# Patient Record
(demographics unavailable — no encounter records)

---

## 2025-03-04 NOTE — ED.PDOC
HPI Comments


83 y/o F, BIBA with PMHX of ESRD, HLD, and HTN presents to the ED for CC of 

chest pain. Per EMS, patient is coming from urgent care complaining of 

intermittent non-radiating chest pain. EMS states, that patient was recently 

diagnosed with pneumonia and has had a productive cough with associated 

intermittent chest pain x1week. EMS comments, patient was prescribed antibiotics

which she finished yesterday. Patient was seen at [facility] urgent care, due to 

persistent symptoms; was relayed to the ED for a further evaluation due to 

concern over EKG. Patient denies fever, chills, body-aches, or nasal congestion.

No other symptoms or modifying factors at this time.


Time Seen by MD:  11:20


Primary Care Provider:  unknown


Reviewed Notes:  Nurses Notes, Paramedic Notes, Medications, Allergies


Allergies:  


Coded Allergies:  


     NO KNOWN ALLERGIES (Unverified , 12/23/22)


Home Meds


Active Scripts


Hydrocodone-Acetaminophen (Hydrocodone Bitartrate/AC 5-325 mg) 1 Tab Tab, 1 TAB 

PO Q8HP PRN, #14 TAB


   Prov:MARÍA OCONNELL MD         12/26/22


Erythromycin (Erythromycin) 5 Mg/Gm Oin, 1 DROP OP QID for 5 Days, #1 OIN 0 

Refills


   Prov:MARLEN GIPSON         5/3/22


Reported Medications


Zinc Gluconate (ZINC) 100 Mg Tab, 50 MG PO DAILY, TAB


   12/23/22


Aspirin (ASPIRIN 81) 81 Mg Tab, 81 MG PO DAILY, TAB


   12/23/22


Ferrous Sulfate (Ferrous Sulfate) 325 Mg Tab, 325 MG PO TIDWM for 30 Days


   12/23/22


Hydroxychloroquine Sulfate (Hydroxychloroquine Sulfat) 200 Mg Tab, 200 MG PO BID

for 30 Days, MG


   12/23/22


Docusate Sodium (Docusate Sodium) 100 Mg Tab, 100 MG PO BIDP for 30 Days, MG


   12/23/22


Atorvastatin Calcium (ATORVASTATIN CALCIUM) 10 Mg Tab, 1 TAB PO HS


   12/23/22


Potassium Chloride (Potassium Chloride ER) 10 Meq Tab, 1 TAB PO DAILY


   12/23/22


Alendronate Sodium (Alendronate Sodium) 70 Mg Tab, 1 TAB PO QWEEKLY


   12/23/22


Trazodone Hcl (Trazodone Hcl) 50 Mg Tab, 1 TAB PO HS


   12/23/22


Fluticasone Furoate-Vilanterol (Breo Ellipta 200-25 Mcg/INH) 1 Inh Inh, 1 PUFF 

INH DAILY


   12/23/22


Sevelamer Carbonate (Sevelamer Carbonate) 2.4 Gm Pow, 1 PKT PO TID


   12/23/22


Losartan Potassium (Losartan Potassium) 25 Mg Tab, 1 TAB PO BID


   12/23/22


Levothyroxine Sodium (Levothyroxine Sodium) 100 Mcg Tab, 1 TAB PO DAILY


   12/23/22


Hctz (Hydrochlorothiazide) 25 Mg Tab, 1 TAB PO DAILY


   12/23/22


Information Source:  Patient, Emergency Med Personnel


Mode of Arrival:  EMS


Severity:  Moderate


Timing:  Days


Duration:  Since onset


Prehospital treatment:  None


Location:  Substernal


Radiation:  No Radiation


Onset:  At Rest


Cardiac Risk Factors:  HTN


PE Risk Factors:  None


History of:  None


Modifying Factors:  Nothing


Associated Signs and Symptoms:  None





Past Medical History


PAST MEDICAL HISTORY:  ESRD, High Lipids, HTN


Surgical History:  Cholecystectomy, Hysterectomy, Tonsillectomy


GYN History:  No Pertinent GYN History





Family History


Family History:  Unknown





Social History


Smoker:  Non-Smoker


Alcohol:  Denies ETOH Use


Drugs:  Denies Drug Use


Lives In:  Home





Constitutional:  denies: chills, diaphoresis, fatigue, fever, malaise, sweats, 

weakness, others


EENTM:  denies: blurred vision, double vision, ear bleeding, ear discharge, ear 

drainage, ear pain, ear ringing, eye pain, eye redness, hearing loss, mouth 

pain, mouth swelling, nasal discharge, nose bleeding, nose congestion, nose 

pain, photophobia, tearing, throat pain, throat swelling, voice changes, others


Respiratory:  reports: cough; denies: hemoptysis, orthopnea, SOB at rest, 

shortness of breath, SOB with excertion, stridor, wheezing, others


Cardiovascular:  reports: chest pain; denies: dizzy spells, diaphoresis, Dyspnea

on exertion, edema, irregular heart beat, left arm pain, lightheadedness, 

palpitations, PND, syncope, others


Gastrointestinal:  denies: abdomen distended, abdominal pain, blood streaked 

bowels, constipated, diarrhea, dysphagia, difficulty swallowing, hematemesis, 

melena, nausea, poor appetite, poor fluid intake, rectal bleeding, rectal pain, 

vomiting, others


Genitourinary:  denies: abnormal vagina bleeding, burning, dyspareunia, dysuria,

flank pain, frequency, hematuria, incontinence, pain, pregnant, vagina 

discharge, urgency, others


Neurological:  denies: dizziness, fainting, headache, left sided numbness, left 

sided weakness, numbness, paresthesia, pre-existing deficit, right sided 

numbness, right sided weakness, seizure, speech problems, tingling, tremors, 

weakness, others


Musculoskeletal:  denies: back pain, gout, joint pain, joint swelling, muscle 

pain, muscle stiffness, neck pain, others


Integumetry:  denies: bruises, change in color, change in hair/nails, dryness, 

laceration, lesions, lumps, rash, wounds, others


Allergic/Immunocompromised:  denies: Difficulty Healing, Frequent Infections, 

Hives, Itching, others


Hematologic/Lymphatic:  denies: anemia, blood clots, easy bleeding, easy 

bruising, swollen glands, others


Endocrine:  denies: excessive hunger, excessive sweating, excessive thirst, 

excessive urination, flushing, intolerance to cold, intolerance to heat, 

unexplained weight gain, unexplained weight loss, others


Psychiatric:  denies: anxiety, bipolar disorder, depression, hopeless, panic 

disorder, schizophrenia, sleepless, suicidal, others


All Other Systems:  Reviewed and Negative





Physical Exam


General Appearance:  Moderate Distress


HEENT:  Normal ENT Inspection, Pharynx Normal, TMs Normal


Neck:  Full Range of Motion, Non-Tender, Normal, Normal Inspection


Respiratory:  Chest Non-Tender, Lungs Clear, No Accessory Muscle Use, No 

Respiratory Distress, Normal Breath Sounds


Cardiovascular:  No Edema, No JVD, No Murmur, No Gallop, Normal Peripheral 

Pulses, Regular Rate/Rhythm


Breast Exam:  Deferred


Gastrointestinal:  No Organomegaly, Non Tender, No Pulsatile Mass, Normal Bowel 

Sounds, Soft


Genitalia:  Deferred


Pelvic:  Deferred


Rectal:  Deferred


Extremities:  No calf tenderness, Normal capillary refill, No pedal edema


Musculoskeletal :  


   Apperance:  Normal


Neurologic:  Alert, CNs II-XII nml as Tested, No Motor Deficits, Normal Affect, 

Normal Mood, No Sensory Deficits


Cerebellar Function:  Normal


Reflexes:  Normal


Skin:  Dry, Normal Color, Warm


Lymphatic:  No Adenopathy





EKG


EKG :  


   Pulse Rate (adult):  109


   Axis:  Normal


   Cardiac Rhythm:  NSR


   ST:  Nonsp (T-wave seems to be somewhat peaked)





Was a procedure done?


Was a procedure done?:  No





CP Differential Dx


Differential Diagnosis:  Angina, MI


Differential Diagnosis:  Chest Wall Pain, Costochondritis





X-Ray, Labs, Meds, VS





                                   Vital Signs








  Date Time  Temp Pulse Resp B/P (MAP) Pulse Ox O2 Delivery O2 Flow Rate FiO2


 


3/4/25 11:34  110      


 


3/4/25 11:26 98.0 110 18 83/48 (60) 97   








                                       Lab








Test


 3/4/25


11:43 Range/Units


 


 


White Blood Count


 9.6 


 4.4-10.8


10^3/uL


 


Red Blood Count


 3.97 L


 4.0-5.20


10^6/uL


 


Hemoglobin 11.6 L 12.2-16.2  g/dL


 


Hematocrit 36.4  36.0-46.0  %


 


Mean Corpuscular Volume 91.9  80.0-100.0  fL


 


Mean Corpuscular Hemoglobin 29.2  28.0-32.0  pg


 


Mean Corpuscular Hemoglobin


Concent 31.8 L


 32.0-36.0  g/dL





 


Red Cell Distribution Width 16.6 H 11.8-14.3  %


 


Platelet Count


 298 


 140-450


10^3/uL


 


Mean Platelet Volume 6.9  6.9-10.8  fL


 


Neutrophils (%) (Auto) 69.7  37.0-80.0  %


 


Lymphocytes (%) (Auto) 20.7  10.0-50.0  %


 


Monocytes (%) (Auto) 8.7  0.0-12.0  %


 


Eosinophils (%) (Auto) 0.5  0.0-7.0  %


 


Basophils (%) (Auto) 0.4  0.0-2.0  %


 


Neutrophils # (Auto)


 6.7 


 1.6-8.6  10


^3/uL


 


Lymphocytes # (Auto)


 2.0 


 0.4-5.4  10


^3/uL


 


Monocytes # (Auto) 0.8  0-1.3  10 ^3/uL


 


Eosinophils # (Auto) 0  0-0.8  10 ^3/uL


 


Basophils # (Auto) 0  0-0.2  10 ^3/uL


 


Nucleated Red Blood Cells 0.4   %


 


Sodium Level 131 L 136-145  mmol/L


 


Potassium Level 5.7 *H 3.5-5.1  mmol/L


 


Chloride Level 98    mmol/L


 


Carbon Dioxide Level 19 L 20-31  mmol/L


 


Anion Gap 14  5-15  


 


Blood Urea Nitrogen 25 H 9-23  mg/dL


 


Creatinine


 6.08 H


 0.550-1.02


mg/dL


 


Glomerular Filtration Rate


Calc 6 


 >90  mL/min





 


BUN/Creatinine Ratio 4.1 L 10.0-20.0  


 


Serum Glucose 93    mg/dL


 


Calcium Level 8.6 L 8.7-10.4  mg/dL


 


Troponin I High Sensitivity 26  </=34  ng/L


 


B-Type Natriuretic Peptide Pending   








                               Current Medications








 Medications


  (Trade)  Dose


 Ordered  Sig/Anthony


 Route  Start Time


 Stop Time Status Last Admin


 


 Methylprednisolone


 Sodium Succinate


  (Solu Medrol)  125 mg  ONCE  ONCE


 IV  3/4/25 11:30


 3/4/25 11:31 DC 3/4/25 12:09





 


 Sodium Chloride  500 ml @ 


 500 mls/hr  Q1H ONCE


 IV  3/4/25 12:45


 3/4/25 13:44  3/4/25 12:48








CXR:


FINDINGS: 





Lines and Tubes: None





Lungs: Clear





Pleura: No effusion.





No pneumothorax. 





Cardiomediastinal contours: Unremarkable





Bones: Unremarkable





IMPRESSION: 





No acute disease. 





Electronically Signed by: Orion Joyce at 03/04/2025 11:58:20 AM











DICTATED BY: ORION JOYCE MD


DICTATED DATE/TIME: 03/04/25 1158





SIGNED BY: ORION JOYCE MD


SIGNED DATE/TIME: 03/04/25 1158





CC: 


The patient had an IV Hep-Lock established 


The patient was given Solu-Medrol 125 mg IV push 


The patient was given saline at 500 cc because the patient was hypotensive 


The chemistry panel came back showing a potassium of 5.7 


The BUN is 25 the creatinine is 6.08 


The CBC shows some anemia with a hemoglobin of 11.6 


The patient was being admitted with a diagnosis of acute renal failure as well 

as hyperkalemia and hypotension


The patient will be admitted at this time


The patient was being given calcium, sodium bicarbonate, dextrose and insulin 

for the hyperkalemia 


A nephrology consult will be obtained.


Images Reviewed?:  Images reviewed and evaluated by me


Time of 1ST Reevaluation:  11:50


Reevaluation 1ST:  Unchanged


Patient Education/Counseling:  Diagnosis, Treatment, Prognosis


Family Education/Counseling:  No Family Present





Departure 1


Departure


Time of Disposition:  13:16


Impression:  


   Primary Impression:  


   Acute chest pain


   Additional Impressions:  


   Hyperkalemia


   Acute renal failure


   Qualified Codes:  N17.1 - Acute kidney failure with acute cortical necrosis


   Hypotension


   Qualified Codes:  I95.0 - Idiopathic hypotension


Disposition:  09 ADMITTED AS INPATIENT


Admit to:  Tele


Condition:  Fair





Critical Care Note


Critical Care Time?:  Yes (55 min-critical care time only)





Stability


Stability form required:  Yes


Unstable for transfer:  Telemetry monitoring (Telemetry monitoring required), ED

Physician Assesment (Clinical assesment)





Heart Score


Heart Score:  








Heart Score Response (Comments) Value


 


History N/A 0


 


EKG N/A 0


 


Age N/A 0


 


Risk Factors N/A 0


 


Troponin N/A 0


 


Total  0














I personally scribed for PANKAJ LAMAS MD ([email or web address]) on 3/4/25 at 11:30.  

Electronically submitted by Emily Reyes ([email or web address]).


I personally scribed for PANKAJ LAMAS MD ([email or web address]) on 3/4/25 at 12:48.  

Electronically submitted by Emily Reyes ([email or web address]).





PANKAJ LAMAS MD               Mar 4, 2025 11:30

## 2025-03-04 NOTE — DVHINCON2
Date of service:  Mar 4, 2025


Referring Physician


Dr Oconnell





Reason for Consultation


hypotension





History of Present Illness


Patient is a 82-year-old female with past medical history of ESRD on PD, AFib 

diagnosed November 21, 2024, chronic abdominal pain, and diarrhea after taking 

lactulose presents after being sent in from urgent care for an abnormal EKG. 

Information in this HPI is limited due to the patient being a poor historian.  


Patient initially went to urgent care with complaints of chest pain, recent 

diagnosis of pneumonia and shortness of breath.  During the emergency department

evaluation potassium levels found to be 5.7/5.9.  Patient also found to be AFib 

130s.  Patient was recently discharged from [facility] on 

November 21, 2024 on Eliquis 2.5 and amiodarone bid.  At this time patient 

endorses chest pain, generalized weakness, abdominal pain.  Denies fevers, 

chills, dizziness, leg swelling,





Past Medical History


Patient's past medical history is significant for AFIB and Chronic renal insuff





Family History:  


Cerebrovascular accident (CVA)


  G8 FATHER


Diabetes mellitus


  G8 FATHER


Hypertension


  G8 FATHER


Rectal cancer


  G8 MOTHER


Social History


Smoke:  No


ALCOHOL:  none


Drugs:  None


Lives:  with Family











Allergies:  


Coded Allergies:  


     NO KNOWN ALLERGIES (Unverified , 12/23/22)


Home Meds


Active Scripts


Hydrocodone-Acetaminophen (Hydrocodone Bitartrate/AC 5-325 mg) 1 Tab Tab, 1 TAB 

PO Q8HP PRN, #14 TAB


   Prov:MARÍA OCONNELL MD         12/26/22


Erythromycin (Erythromycin) 5 Mg/Gm Oin, 1 DROP OP QID for 5 Days, #1 OIN 0 

Refills


   Prov:MARLEN GIPSON         5/3/22


Reported Medications


Zinc Gluconate (ZINC) 100 Mg Tab, 50 MG PO DAILY, TAB


   12/23/22


Aspirin (ASPIRIN 81) 81 Mg Tab, 81 MG PO DAILY, TAB


   12/23/22


Ferrous Sulfate (Ferrous Sulfate) 325 Mg Tab, 325 MG PO TIDWM for 30 Days


   12/23/22


Hydroxychloroquine Sulfate (Hydroxychloroquine Sulfat) 200 Mg Tab, 200 MG PO BID

for 30 Days, MG


   12/23/22


Docusate Sodium (Docusate Sodium) 100 Mg Tab, 100 MG PO BIDP for 30 Days, MG


   12/23/22


Atorvastatin Calcium (ATORVASTATIN CALCIUM) 10 Mg Tab, 1 TAB PO HS


   12/23/22


Potassium Chloride (Potassium Chloride ER) 10 Meq Tab, 1 TAB PO DAILY


   12/23/22


Alendronate Sodium (Alendronate Sodium) 70 Mg Tab, 1 TAB PO QWEEKLY


   12/23/22


Trazodone Hcl (Trazodone Hcl) 50 Mg Tab, 1 TAB PO HS


   12/23/22


Fluticasone Furoate-Vilanterol (Breo Ellipta 200-25 Mcg/INH) 1 Inh Inh, 1 PUFF 

INH DAILY


   12/23/22


Sevelamer Carbonate (Sevelamer Carbonate) 2.4 Gm Pow, 1 PKT PO TID


   12/23/22


Losartan Potassium (Losartan Potassium) 25 Mg Tab, 1 TAB PO BID


   12/23/22


Levothyroxine Sodium (Levothyroxine Sodium) 100 Mcg Tab, 1 TAB PO DAILY


   12/23/22


Hctz (Hydrochlorothiazide) 25 Mg Tab, 1 TAB PO DAILY


   12/23/22


Current Medications





                               Current Medications








 Medications


  (Trade)  Dose


 Ordered  Sig/Anthony


 Route


 PRN Reason  Start Time


 Stop Time Status Last Admin


 


 Sodium Chloride  1,000 ml @ 


 60 mls/hr  [id number]


 IV


   3/4/25 17:15


 3/4/25 17:32 DC  





 


 Acetaminophen/


 Hydrocodone Bitart


  (Norco 5/325MG


 Tab)  1 tab  Q4HP  PRN


 PO


 MODERATE PAIN (4-6 PAIN SCALE)  3/4/25 17:15


 3/4/25 17:32 DC  





 


 Ondansetron HCl


  (Zofran)  4 mg  Q4HP  PRN


 IV


 NAUSEA / VOMITING  3/4/25 17:15


 3/4/25 17:32 DC  





 


 Acetaminophen


  (Tylenol Tablet)  650 mg  Q6HP  PRN


 PO


 PAIN SCALE 1-3  OR TEMP>100.4  3/4/25 17:15


 3/4/25 17:32 DC  





 


 Morphine Sulfate  2 mg  Q4HPRN  PRN


 IV


 SEVERE PAIN (7-10 PAIN SCALE)  3/4/25 17:15


 3/4/25 17:32 DC  





 


 Nitroglycerin


  (Ntrostat


 Sublingual)  0.4 mg  Q5MINP  PRN


 SL


 FOR CHEST PAIN  3/4/25 17:15


 3/4/25 17:32 DC  





 


 Morphine Sulfate  2 mg  Q30M  PRN


 IV


 FOR CHEST PAIN  3/4/25 17:15


 3/4/25 17:32 GA  





 


 Aspirin


  (Ecotrin Enteric


 Coated Tablet)  81 mg  DAILY


 PO


   3/5/25 10:00


 3/4/25 17:32 DC  





 


 Hydroxychloroquine


 Sulfate


  (Plaquenil


 Tablet)  200 mg  BID


 PO


   3/4/25 22:00


 3/4/25 17:32 DC  





 


 Levothyroxine


 Sodium


  (Synthroid


 Tablet)  100 mcg  DAILY


 PO


   3/5/25 10:00


 3/4/25 17:32 DC  





 


 Patient Own


 Medication  1 tab  HS


 PO


   3/4/25 22:00


 3/4/25 17:32 DC  





 


 Patient Own


 Medication  100 mg  BIDP


 PO


   3/4/25 22:00


 3/4/25 17:32 DC  





 


 Patient Own


 Medication  1 puff  DAILY


 INH


   3/5/25 10:00


 3/4/25 17:32 GA  





 


 Patient Own


 Medication  1 pkt  TID


 PO


   3/4/25 22:00


 3/4/25 17:32 GA  





 


 Patient Own


 Medication  50 mg  DAILY


 PO


   3/5/25 10:00


 3/4/25 17:32 DC  





 


 Ondansetron HCl


  (Zofran)  4 mg  Q4HP  PRN


 IV


 NAUSEA / VOMITING  3/4/25 17:30


     





 


 Morphine Sulfate  2 mg  Q4HPRN  PRN


 IV


 SEVERE PAIN (7-10 PAIN SCALE)  3/4/25 17:30


     





 


 Morphine Sulfate  2 mg  Q30M  PRN


 IV


 FOR CHEST PAIN  3/4/25 17:30


     





 


 Sodium Chloride  1,000 ml @ 


 60 mls/hr  [id number]


 IV


   3/4/25 17:30


     





 


 Acetaminophen/


 Hydrocodone Bitart


  (Norco 5/325MG


 Tab)  1 tab  Q4HP  PRN


 PO


 MODERATE PAIN (4-6 PAIN SCALE)  3/4/25 17:30


     





 


 Acetaminophen


  (Tylenol Tablet)  650 mg  Q6HP  PRN


 PO


 PAIN SCALE 1-3  OR TEMP>100.4  3/4/25 17:30


     





 


 Nitroglycerin


  (Ntrostat


 Sublingual)  0.4 mg  Q5MINP  PRN


 SL


 FOR CHEST PAIN  3/4/25 17:30


     





 


 Aspirin


  (Ecotrin Enteric


 Coated Tablet)  81 mg  DAILY


 PO


   3/5/25 10:00


     





 


 Hydroxychloroquine


 Sulfate


  (Plaquenil


 Tablet)  200 mg  BID


 PO


   3/4/25 22:00


     





 


 Levothyroxine


 Sodium


  (Synthroid


 Tablet)  100 mcg  DAILY


 PO


   3/5/25 10:00


     





 


 Atorvastatin


 Calcium


  (Lipitor)  10 mg  HS


 PO


   3/4/25 22:00


     





 


 Docusate Sodium


  (Colace Capsule)  100 mg  BIDP  PRN


 PO


 CONSTIPATION  3/4/25 21:00


     





 


 Patient Own


 Medication  1 puff  DAILY


 INH


   3/5/25 10:00


   UNV  





 


 Patient Own


 Medication  1 pkt  TID


 PO


   3/4/25 22:00


   UNV  





 


 Patient Own


 Medication  50 mg  DAILY


 PO


   3/5/25 10:00


   UNV  





 


 Vancomycin HCl  0 ml @ 0


 mls/hr  UD


 IV


   3/4/25 18:45


   UNV  





 


 Cefepime HCl  50 ml @ 


 12.5 mls/hr  DAILY


 IV


   3/5/25 10:00


   UNV  





 


 Metronidazole  100 ml @ 


 100 mls/hr  Q8HR


 IV


   3/4/25 22:00


     














Review of Systems


ROS limited


poor historian





Vital Signs





                                   Vital Signs








  Date Time  Temp Pulse Resp B/P (MAP) Pulse Ox O2 Delivery O2 Flow Rate FiO2


 


3/4/25 20:07  118 19  92 Room Air* 0 21


 


3/4/25 19:00    91/46 (61)    


 


3/4/25 11:26 98.0       








Physical Exam


General Appearance:  Alert, Oriented X3, mild distress


HEENT:  Atraumatic, PERRLA, EOMI


Respiratory:  Clear to auscultation, Normal air movement


Cardiovascular:  Normal S1, Normal S2, Other (afib)


Abdominal:  Other (Diffuse tenderness to deep and light palpation): Pd catheter 

site looks okay


Extremities:  No clubbing, No cyanosis, Normal pulses


Skin:  No rashes, No breakdown


Neuro:  Normal speech, Strength at 5/5 X4 ext





Labs/Diagnostic Data





                                      Labs








Test


 3/4/25


19:03 3/4/25


14:50 3/4/25


11:43 Range/Units


 


 


Sodium Level 137 #   136-145  mmol/L


 


Potassium Level 5.9 *H   3.5-5.1  mmol/L


 


Chloride Level 101      mmol/L


 


Carbon Dioxide Level 26    20-31  mmol/L


 


Anion Gap 10    5-15  


 


Blood Urea Nitrogen 30 H   9-23  mg/dL


 


Creatinine


 6.19 H


 


 


 0.550-1.02


mg/dL


 


Glomerular Filtration Rate


Calc 6 


 


 


 >90  mL/min





 


BUN/Creatinine Ratio 4.8 L   10.0-20.0  


 


Serum Glucose 92      mg/dL


 


Calcium Level 8.3 L   8.7-10.4  mg/dL


 


Lactic Acid Level  3.3 *H  0.4-2.0  mmol/L


 


Troponin I High Sensitivity  26   </=34  ng/L


 


White Blood Count


 


 


 9.6 


 4.4-10.8


10^3/uL


 


Red Blood Count


 


 


 3.97 L


 4.0-5.20


10^6/uL


 


Hemoglobin   11.6 L 12.2-16.2  g/dL


 


Hematocrit   36.4  36.0-46.0  %


 


Mean Corpuscular Volume   91.9  80.0-100.0  fL


 


Mean Corpuscular Hemoglobin   29.2  28.0-32.0  pg


 


Mean Corpuscular Hemoglobin


Concent 


 


 31.8 L


 32.0-36.0  g/dL





 


Red Cell Distribution Width   16.6 H 11.8-14.3  %


 


Platelet Count


 


 


 298 


 140-450


10^3/uL


 


Mean Platelet Volume   6.9  6.9-10.8  fL


 


Neutrophils (%) (Auto)   69.7  37.0-80.0  %


 


Lymphocytes (%) (Auto)   20.7  10.0-50.0  %


 


Monocytes (%) (Auto)   8.7  0.0-12.0  %


 


Eosinophils (%) (Auto)   0.5  0.0-7.0  %


 


Basophils (%) (Auto)   0.4  0.0-2.0  %


 


Neutrophils # (Auto)


 


 


 6.7 


 1.6-8.6  10


^3/uL


 


Lymphocytes # (Auto)


 


 


 2.0 


 0.4-5.4  10


^3/uL


 


Monocytes # (Auto)   0.8  0-1.3  10 ^3/uL


 


Eosinophils # (Auto)   0  0-0.8  10 ^3/uL


 


Basophils # (Auto)   0  0-0.2  10 ^3/uL


 


Nucleated Red Blood Cells   0.4   %


 


B-Type Natriuretic Peptide   250.70  0-100  pg/mL











Assessment


Patient is a 82-year-old female presents to the hospital with:





Hypotension


Lactic Acidosis 


Afib uncontrolled


Chest pain


Hyperkalemia


ESRD on PD


Chronic abdominal pain


Diarrhea 





Recommendations:


On pressor, hypotension is likely cardiac due to Afib with rvr vs sepsis





c/o abdominal pain, has PD catheter, recommend fluid analysis, follow cultures





Switched IV ceftriaxone to IV cefepime


cont IV Vancomycin


follow blood cultures





Antibiotic status:


Vancomycin IV [Started on 03/05]





03/04, Abdomen Pelvis CT showed No acute intra-abdominal finding. Isodense 

lesion of the right mid kidney measuring 1.2 cm, attention on follow-up is 

recommended.  


Compression fracture of L2 and possibly superior endplate of L1 with mild 

posterior extension resulting in mild spinal canal stenosis, age-indeterminate  





03/04, Blood culture showed no growth





critical time 45 minutes spent


prognosis gaurded





Thank you for consult.


Plan discussed with:  GELA Costa MD             Mar 4, 2025 20:59

## 2025-03-04 NOTE — DVH
CHEST RADIOGRAPH



Indication: sob



Technique: Single frontal view of the chest was obtained



COMPARISON: CHEST PORTABLE on DOS: 12/27/22, CXRP on DOS: 12/27/22



FINDINGS: 



Lines and Tubes: None



Lungs: Clear



Pleura: No effusion.



No pneumothorax. 



Cardiomediastinal contours: Unremarkable



Bones: Unremarkable



IMPRESSION: 



No acute disease. 



Electronically Signed by: Orion Hodge at 03/04/2025 11:58:20 AM

## 2025-03-04 NOTE — ECG
[facility]

                                       

Test Date:    2025               Test Time:    11:34:54

Pat Name:     MARTIN ADAMS         Department:   ER

Patient ID:   [id number]           Room:         [address] [place] [address]

Gender:       F                        Technician:   GA

:          [id number]               Requested By: PANKAJ LAMAS

Order Number: [id number]        Reading MD:   Geoff Suarez

                                 Measurements

Intervals                              Axis          

Rate:         110                      P:            61

SD:           169                      QRS:          34

QRSD:         104                      T:            64

QT:           341                                    

QTc:          462                                    

                           Interpretive Statements

Sinus tachycardia

Low voltage, precordial leads

Electronically Signed On 3-8-2025 17:43:58 PST by Geoff Suarez



Please click the below link to view image of tracing.

## 2025-03-04 NOTE — ECG
[facility]

                                       

Test Date:    2025               Test Time:    12:33:38

Pat Name:     MARTIN ADAMS         Department:   er

Patient ID:   [id number]           Room:         [address] [place] [address]

Gender:       F                        Technician:   ga

:          [id number]               Requested By: PANKAJ LAMAS

Order Number: [id number]        Reading MD:   Geoff Suarez

                                 Measurements

Intervals                              Axis          

Rate:         112                      P:            50

CA:           150                      QRS:          26

QRSD:         111                      T:            59

QT:           346                                    

QTc:          473                                    

                           Interpretive Statements

Sinus tachycardia

Low voltage, precordial leads

Electronically Signed On 3-8-2025 17:44:36 PST by Geoff Suarez



Please click the below link to view image of tracing.

## 2025-03-05 NOTE — DVHINCON2
Date of service:  Mar 5, 2025





History of Present Illness


Information in this HPI is limited due to the patient being a poor historian.  

Acquired with the assistance of the patient's daughter at the bedside.  82-year-

old female with past medical history of ESRD on PD, AFib diagnosed November 21, 2024, chronic abdominal pain, and diarrhea after taking lactulose presents after

being sent in from urgent care for an abnormal EKG.  Patient initially went to 

urgent care with complaints of chest pain, recent diagnosis of pneumonia and 

shortness of breath.  During the emergency department evaluation potassium 

levels found to be 5.7/5.9.  Patient also found to be AFib 130s.  Patient was 

recently discharged from [facility] on November 21, 2024 on 

Eliquis 2.5 and amiodarone bid.  At this time patient endorses chest pain, 

generalized weakness, abdominal pain.  Denies fevers, chills, dizziness, leg 

swelling,


Cardiovascular:  AFIB


Renal/:  Chronic renal insuff


Smoke:  No


ALCOHOL:  none


Drugs:  None


Lives:  with Family





Past Medical History


reviewed





Family History:  


Cerebrovascular accident (CVA)


  G8 FATHER


Diabetes mellitus


  G8 FATHER


Hypertension


  G8 FATHER


Rectal cancer


  G8 MOTHER





Allergies:  


Coded Allergies:  


     NO KNOWN ALLERGIES (Unverified , 12/23/22)


Home Meds


Active Scripts


Hydrocodone-Acetaminophen (Hydrocodone Bitartrate/AC 5-325 mg) 1 Tab Tab, 1 TAB 

PO Q8HP PRN, #14 TAB


   Prov:MARÍA OCONNELL MD         12/26/22


Erythromycin (Erythromycin) 5 Mg/Gm Oin, 1 DROP OP QID for 5 Days, #1 OIN 0 

Refills


   Prov:MARLEN GIPSON         5/3/22


Reported Medications


Zinc Gluconate (ZINC) 100 Mg Tab, 50 MG PO DAILY, TAB


   12/23/22


Aspirin (ASPIRIN 81) 81 Mg Tab, 81 MG PO DAILY, TAB


   12/23/22


Ferrous Sulfate (Ferrous Sulfate) 325 Mg Tab, 325 MG PO TIDWM for 30 Days


   12/23/22


Hydroxychloroquine Sulfate (Hydroxychloroquine Sulfat) 200 Mg Tab, 200 MG PO BID

for 30 Days, MG


   12/23/22


Docusate Sodium (Docusate Sodium) 100 Mg Tab, 100 MG PO BIDP for 30 Days, MG


   12/23/22


Atorvastatin Calcium (ATORVASTATIN CALCIUM) 10 Mg Tab, 1 TAB PO HS


   12/23/22


Potassium Chloride (Potassium Chloride ER) 10 Meq Tab, 1 TAB PO DAILY


   12/23/22


Alendronate Sodium (Alendronate Sodium) 70 Mg Tab, 1 TAB PO QWEEKLY


   12/23/22


Trazodone Hcl (Trazodone Hcl) 50 Mg Tab, 1 TAB PO HS


   12/23/22


Fluticasone Furoate-Vilanterol (Breo Ellipta 200-25 Mcg/INH) 1 Inh Inh, 1 PUFF 

INH DAILY


   12/23/22


Sevelamer Carbonate (Sevelamer Carbonate) 2.4 Gm Pow, 1 PKT PO TID


   12/23/22


Losartan Potassium (Losartan Potassium) 25 Mg Tab, 1 TAB PO BID


   12/23/22


Levothyroxine Sodium (Levothyroxine Sodium) 100 Mcg Tab, 1 TAB PO DAILY


   12/23/22


Hctz (Hydrochlorothiazide) 25 Mg Tab, 1 TAB PO DAILY


   12/23/22


Current Medications





                               Current Medications








 Medications


  (Trade)  Dose


 Ordered  Sig/Anthony


 Route


 PRN Reason  Start Time


 Stop Time Status Last Admin


 


 Sodium Chloride  1,000 ml @ 


 60 mls/hr  [id number]


 IV


   3/4/25 17:15


 3/4/25 17:32 DC  





 


 Acetaminophen/


 Hydrocodone Bitart


  (Norco 5/325MG


 Tab)  1 tab  Q4HP  PRN


 PO


 MODERATE PAIN (4-6 PAIN SCALE)  3/4/25 17:15


 3/4/25 17:32 DC  





 


 Ondansetron HCl


  (Zofran)  4 mg  Q4HP  PRN


 IV


 NAUSEA / VOMITING  3/4/25 17:15


 3/4/25 17:32 DC  





 


 Acetaminophen


  (Tylenol Tablet)  650 mg  Q6HP  PRN


 PO


 PAIN SCALE 1-3  OR TEMP>100.4  3/4/25 17:15


 3/4/25 17:32 DC  





 


 Morphine Sulfate  2 mg  Q4HPRN  PRN


 IV


 SEVERE PAIN (7-10 PAIN SCALE)  3/4/25 17:15


 3/4/25 17:32 DC  





 


 Nitroglycerin


  (Ntrostat


 Sublingual)  0.4 mg  Q5MINP  PRN


 SL


 FOR CHEST PAIN  3/4/25 17:15


 3/4/25 17:32 DC  





 


 Morphine Sulfate  2 mg  Q30M  PRN


 IV


 FOR CHEST PAIN  3/4/25 17:15


 3/4/25 17:32 DC  





 


 Aspirin


  (Ecotrin Enteric


 Coated Tablet)  81 mg  DAILY


 PO


   3/5/25 10:00


 3/4/25 17:32 DC  





 


 Hydroxychloroquine


 Sulfate


  (Plaquenil


 Tablet)  200 mg  BID


 PO


   3/4/25 22:00


 3/4/25 17:32 DC  





 


 Levothyroxine


 Sodium


  (Synthroid


 Tablet)  100 mcg  DAILY


 PO


   3/5/25 10:00


 3/4/25 17:32 DC  





 


 Patient Own


 Medication  1 tab  HS


 PO


   3/4/25 22:00


 3/4/25 17:32 DC  





 


 Patient Own


 Medication  100 mg  BIDP


 PO


   3/4/25 22:00


 3/4/25 17:32 DC  





 


 Patient Own


 Medication  1 puff  DAILY


 INH


   3/5/25 10:00


 3/4/25 17:32 DC  





 


 Patient Own


 Medication  1 pkt  TID


 PO


   3/4/25 22:00


 3/4/25 17:32 DC  





 


 Patient Own


 Medication  50 mg  DAILY


 PO


   3/5/25 10:00


 3/4/25 17:32 DC  





 


 Ondansetron HCl


  (Zofran)  4 mg  Q4HP  PRN


 IV


 NAUSEA / VOMITING  3/4/25 17:30


     





 


 Morphine Sulfate  2 mg  Q4HPRN  PRN


 IV


 SEVERE PAIN (7-10 PAIN SCALE)  3/4/25 17:30


    3/5/25 09:01





 


 Morphine Sulfate  2 mg  Q30M  PRN


 IV


 FOR CHEST PAIN  3/4/25 17:30


     





 


 Sodium Chloride  1,000 ml @ 


 60 mls/hr  [id number]


 IV


   3/4/25 17:30


    3/4/25 23:24





 


 Acetaminophen/


 Hydrocodone Bitart


  (Norco 5/325MG


 Tab)  1 tab  Q4HP  PRN


 PO


 MODERATE PAIN (4-6 PAIN SCALE)  3/4/25 17:30


    3/5/25 08:40





 


 Acetaminophen


  (Tylenol Tablet)  650 mg  Q6HP  PRN


 PO


 PAIN SCALE 1-3  OR TEMP>100.4  3/4/25 17:30


    3/5/25 06:40





 


 Nitroglycerin


  (Ntrostat


 Sublingual)  0.4 mg  Q5MINP  PRN


 SL


 FOR CHEST PAIN  3/4/25 17:30


     





 


 Aspirin


  (Ecotrin Enteric


 Coated Tablet)  81 mg  DAILY


 PO


   3/5/25 10:00


    3/5/25 10:24





 


 Hydroxychloroquine


 Sulfate


  (Plaquenil


 Tablet)  200 mg  BID


 PO


   3/4/25 22:00


    3/5/25 10:24





 


 Levothyroxine


 Sodium


  (Synthroid


 Tablet)  100 mcg  DAILY


 PO


   3/5/25 10:00


    3/5/25 10:24





 


 Atorvastatin


 Calcium


  (Lipitor)  10 mg  HS


 PO


   3/4/25 22:00


    3/4/25 23:31





 


 Docusate Sodium


  (Colace Capsule)  100 mg  BIDP  PRN


 PO


 CONSTIPATION  3/4/25 21:00


     





 


 Albuterol


  (Ventolin Medneb)  2.5 mg  Q6HR


 NEB


   3/5/25 00:00


     





 


 Sevelamer HCl


  (Renagel)  2,400 mg  TIDWM


 PO


   3/5/25 08:00


    3/5/25 08:42





 


 Zinc Sulfate  220 mg  DAILY


 PO


   3/5/25 10:00


    3/5/25 08:42





 


 Vancomycin HCl  0 ml @ 0


 mls/hr  UD


 IV


   3/4/25 18:45


     





 


 Cefepime HCl  50 ml @ 


 12.5 mls/hr  DAILY


 IV


   3/5/25 10:00


    3/5/25 10:09





 


 Metronidazole  100 ml @ 


 100 mls/hr  Q8HR


 IV


   3/4/25 22:00


    3/5/25 06:40





 


 Budesonide


  (Pulmicort)  0.5 mg  BID


 NEB


   3/4/25 22:00


     





 


 Norepinephrine


 Bitartrate  250 ml @ 


 3.75 mls/hr  Q24H


 IV


   3/4/25 23:45


    3/5/25 02:10





 


 Midodrine


  (Proamatine


 Tablet)  10 mg  TID@0600,1200,1800


 PO


   3/5/25 06:00


    3/5/25 06:40





 


 Phenylephrine HCl  250 ml @ 


 30 mls/hr  Q8H20M


 IV


   3/5/25 03:30


     














Review of Systems


not obtained, pt sleeping





Vital Signs





                                   Vital Signs








  Date Time  Temp Pulse Resp B/P (MAP) Pulse Ox O2 Delivery O2 Flow Rate FiO2


 


3/5/25 11:17   14   Nasal Cannula* 2 28


 


3/5/25 09:35  108      


 


3/5/25 09:31    95/48    


 


3/5/25 08:15     90   


 


3/5/25 06:40 97.8       








Physical Exam


nad


s1 s2 rrr


ctab


soft distended





Labs/Diagnostic Data





                                      Labs








Test


 3/5/25


05:38 3/4/25


21:35 3/4/25


14:50 3/4/25


11:43 Range/Units


 


 


Sodium Level 134 L    136-145  mmol/L


 


Potassium Level 5.0     3.5-5.1  mmol/L


 


Chloride Level 93 L      mmol/L


 


Carbon Dioxide Level 25     20-31  mmol/L


 


Anion Gap 16 H    5-15  


 


Blood Urea Nitrogen 34 H    9-23  mg/dL


 


Creatinine


 6.12 H


 


 


 


 0.550-1.02


mg/dL


 


Glomerular Filtration Rate


Calc 6 


 


 


 


 >90  mL/min





 


BUN/Creatinine Ratio 5.6 L    10.0-20.0  


 


Serum Glucose 141 H      mg/dL


 


Calcium Level 8.8     8.7-10.4  mg/dL


 


Random Vancomycin Level 16.6 H    5-10  ug/mL


 


POC Glucose  128 H     mg/dl


 


Lactic Acid Level   3.3 *H  0.4-2.0  mmol/L


 


Troponin I High Sensitivity   26   </=34  ng/L


 


White Blood Count


 


 


 


 9.6 


 4.4-10.8


10^3/uL


 


Red Blood Count


 


 


 


 3.97 L


 4.0-5.20


10^6/uL


 


Hemoglobin    11.6 L 12.2-16.2  g/dL


 


Hematocrit    36.4  36.0-46.0  %


 


Mean Corpuscular Volume    91.9  80.0-100.0  fL


 


Mean Corpuscular Hemoglobin    29.2  28.0-32.0  pg


 


Mean Corpuscular Hemoglobin


Concent 


 


 


 31.8 L


 32.0-36.0  g/dL





 


Red Cell Distribution Width    16.6 H 11.8-14.3  %


 


Platelet Count


 


 


 


 298 


 140-450


10^3/uL


 


Mean Platelet Volume    6.9  6.9-10.8  fL


 


Neutrophils (%) (Auto)    69.7  37.0-80.0  %


 


Lymphocytes (%) (Auto)    20.7  10.0-50.0  %


 


Monocytes (%) (Auto)    8.7  0.0-12.0  %


 


Eosinophils (%) (Auto)    0.5  0.0-7.0  %


 


Basophils (%) (Auto)    0.4  0.0-2.0  %


 


Neutrophils # (Auto)


 


 


 


 6.7 


 1.6-8.6  10


^3/uL


 


Lymphocytes # (Auto)


 


 


 


 2.0 


 0.4-5.4  10


^3/uL


 


Monocytes # (Auto)    0.8  0-1.3  10 ^3/uL


 


Eosinophils # (Auto)    0  0-0.8  10 ^3/uL


 


Basophils # (Auto)    0  0-0.2  10 ^3/uL


 


Nucleated Red Blood Cells    0.4   %


 


B-Type Natriuretic Peptide    250.70  0-100  pg/mL











Assessment


afib


htn


esrd on pd


hyperkalemia


chest pain


Plan/Recommendation


pt is now SR from afib 


cont eliquis


cont BB, amiodarone 200 mg daily


check echo


PD per renal and treat hyperkalemia


trop is -, acs ruled out


conservative management given poor health


Plan discussed with:  Patient











RAFAEL STONE MD               Mar 5, 2025 11:57

## 2025-03-05 NOTE — DVHINCON2
Date of service:  Mar 5, 2025





Reason for Consultation


esrd





History of Present Illness


82 years old female with past medical history of ESRD on PD atrial fibrillation,

hypertension presented with chief complaints of chest pain and shortness of 

breath,, she denies any cloudy effluent from PD catheter


Patient is on [facility]CPD


Noted hyperkalemia on admission that was treated medically


Patient currently treated with IV antibiotics





Past Medical History


As per HPI





Past Surgical History


As per HPI





Allergies:  


Coded Allergies:  


     NO KNOWN ALLERGIES (Unverified , 12/23/22)


Home Meds


Active Scripts


Hydrocodone-Acetaminophen (Hydrocodone Bitartrate/AC 5-325 mg) 1 Tab Tab, 1 TAB 

PO Q8HP PRN, #14 TAB


   Prov:MARÍA OCONNELL MD         12/26/22


Erythromycin (Erythromycin) 5 Mg/Gm Oin, 1 DROP OP QID for 5 Days, #1 OIN 0 

Refills


   Prov:MARLEN GIPSON         5/3/22


Reported Medications


Zinc Gluconate (ZINC) 100 Mg Tab, 50 MG PO DAILY, TAB


   12/23/22


Aspirin (ASPIRIN 81) 81 Mg Tab, 81 MG PO DAILY, TAB


   12/23/22


Ferrous Sulfate (Ferrous Sulfate) 325 Mg Tab, 325 MG PO TIDWM for 30 Days


   12/23/22


Hydroxychloroquine Sulfate (Hydroxychloroquine Sulfat) 200 Mg Tab, 200 MG PO BID

for 30 Days, MG


   12/23/22


Docusate Sodium (Docusate Sodium) 100 Mg Tab, 100 MG PO BIDP for 30 Days, MG


   12/23/22


Atorvastatin Calcium (ATORVASTATIN CALCIUM) 10 Mg Tab, 1 TAB PO HS


   12/23/22


Potassium Chloride (Potassium Chloride ER) 10 Meq Tab, 1 TAB PO DAILY


   12/23/22


Alendronate Sodium (Alendronate Sodium) 70 Mg Tab, 1 TAB PO QWEEKLY


   12/23/22


Trazodone Hcl (Trazodone Hcl) 50 Mg Tab, 1 TAB PO HS


   12/23/22


Fluticasone Furoate-Vilanterol (Breo Ellipta 200-25 Mcg/INH) 1 Inh Inh, 1 PUFF 

INH DAILY


   12/23/22


Sevelamer Carbonate (Sevelamer Carbonate) 2.4 Gm Pow, 1 PKT PO TID


   12/23/22


Losartan Potassium (Losartan Potassium) 25 Mg Tab, 1 TAB PO BID


   12/23/22


Levothyroxine Sodium (Levothyroxine Sodium) 100 Mcg Tab, 1 TAB PO DAILY


   12/23/22


Hctz (Hydrochlorothiazide) 25 Mg Tab, 1 TAB PO DAILY


   12/23/22


Current Medications





                               Current Medications








 Medications


  (Trade)  Dose


 Ordered  Sig/Anthony


 Route


 PRN Reason  Start Time


 Stop Time Status Last Admin


 


 Sodium Chloride  1,000 ml @ 


 60 mls/hr  [id number]


 IV


   3/4/25 17:15


 3/4/25 17:32 DC  





 


 Acetaminophen/


 Hydrocodone Bitart


  (Norco 5/325MG


 Tab)  1 tab  Q4HP  PRN


 PO


 MODERATE PAIN (4-6 PAIN SCALE)  3/4/25 17:15


 3/4/25 17:32 DC  





 


 Ondansetron HCl


  (Zofran)  4 mg  Q4HP  PRN


 IV


 NAUSEA / VOMITING  3/4/25 17:15


 3/4/25 17:32 DC  





 


 Acetaminophen


  (Tylenol Tablet)  650 mg  Q6HP  PRN


 PO


 PAIN SCALE 1-3  OR TEMP>100.4  3/4/25 17:15


 3/4/25 17:32 DC  





 


 Morphine Sulfate  2 mg  Q4HPRN  PRN


 IV


 SEVERE PAIN (7-10 PAIN SCALE)  3/4/25 17:15


 3/4/25 17:32 DC  





 


 Nitroglycerin


  (Ntrostat


 Sublingual)  0.4 mg  Q5MINP  PRN


 SL


 FOR CHEST PAIN  3/4/25 17:15


 3/4/25 17:32 DC  





 


 Morphine Sulfate  2 mg  Q30M  PRN


 IV


 FOR CHEST PAIN  3/4/25 17:15


 3/4/25 17:32 DC  





 


 Aspirin


  (Ecotrin Enteric


 Coated Tablet)  81 mg  DAILY


 PO


   3/5/25 10:00


 3/4/25 17:32 DC  





 


 Hydroxychloroquine


 Sulfate


  (Plaquenil


 Tablet)  200 mg  BID


 PO


   3/4/25 22:00


 3/4/25 17:32 DC  





 


 Levothyroxine


 Sodium


  (Synthroid


 Tablet)  100 mcg  DAILY


 PO


   3/5/25 10:00


 3/4/25 17:32 DC  





 


 Patient Own


 Medication  1 tab  HS


 PO


   3/4/25 22:00


 3/4/25 17:32 DC  





 


 Patient Own


 Medication  100 mg  BIDP


 PO


   3/4/25 22:00


 3/4/25 17:32 DC  





 


 Patient Own


 Medication  1 puff  DAILY


 INH


   3/5/25 10:00


 3/4/25 17:32 DC  





 


 Patient Own


 Medication  1 pkt  TID


 PO


   3/4/25 22:00


 3/4/25 17:32 DC  





 


 Patient Own


 Medication  50 mg  DAILY


 PO


   3/5/25 10:00


 3/4/25 17:32 DC  





 


 Ondansetron HCl


  (Zofran)  4 mg  Q4HP  PRN


 IV


 NAUSEA / VOMITING  3/4/25 17:30


     





 


 Morphine Sulfate  2 mg  Q4HPRN  PRN


 IV


 SEVERE PAIN (7-10 PAIN SCALE)  3/4/25 17:30


    3/5/25 14:13





 


 Morphine Sulfate  2 mg  Q30M  PRN


 IV


 FOR CHEST PAIN  3/4/25 17:30


     





 


 Sodium Chloride  1,000 ml @ 


 60 mls/hr  [id number]


 IV


   3/4/25 17:30


 3/5/25 16:44 DC 3/4/25 23:24





 


 Acetaminophen/


 Hydrocodone Bitart


  (Norco 5/325MG


 Tab)  1 tab  Q4HP  PRN


 PO


 MODERATE PAIN (4-6 PAIN SCALE)  3/4/25 17:30


    3/5/25 08:40





 


 Acetaminophen


  (Tylenol Tablet)  650 mg  Q6HP  PRN


 PO


 PAIN SCALE 1-3  OR TEMP>100.4  3/4/25 17:30


    3/5/25 06:40





 


 Nitroglycerin


  (Ntrostat


 Sublingual)  0.4 mg  Q5MINP  PRN


 SL


 FOR CHEST PAIN  3/4/25 17:30


     





 


 Aspirin


  (Ecotrin Enteric


 Coated Tablet)  81 mg  DAILY


 PO


   3/5/25 10:00


    3/5/25 10:24





 


 Hydroxychloroquine


 Sulfate


  (Plaquenil


 Tablet)  200 mg  BID


 PO


   3/4/25 22:00


    3/5/25 10:24





 


 Levothyroxine


 Sodium


  (Synthroid


 Tablet)  100 mcg  DAILY


 PO


   3/5/25 10:00


    3/5/25 10:24





 


 Atorvastatin


 Calcium


  (Lipitor)  10 mg  HS


 PO


   3/4/25 22:00


    3/4/25 23:31





 


 Docusate Sodium


  (Colace Capsule)  100 mg  BIDP  PRN


 PO


 CONSTIPATION  3/4/25 21:00


     





 


 Albuterol


  (Ventolin Medneb)  2.5 mg  Q6HR


 NEB


   3/5/25 00:00


    3/5/25 11:51





 


 Sevelamer HCl


  (Renagel)  2,400 mg  TIDWM


 PO


   3/5/25 08:00


    3/5/25 08:42





 


 Zinc Sulfate  220 mg  DAILY


 PO


   3/5/25 10:00


    3/5/25 08:42





 


 Vancomycin HCl  0 ml @ 0


 mls/hr  UD


 IV


   3/4/25 18:45


     





 


 Cefepime HCl  50 ml @ 


 12.5 mls/hr  DAILY


 IV


   3/5/25 10:00


    3/5/25 10:09





 


 Metronidazole  100 ml @ 


 100 mls/hr  Q8HR


 IV


   3/4/25 22:00


    3/5/25 13:57





 


 Budesonide


  (Pulmicort)  0.5 mg  BID


 NEB


   3/4/25 22:00


    3/5/25 11:51





 


 Norepinephrine


 Bitartrate  250 ml @ 


 3.75 mls/hr  Q24H


 IV


   3/4/25 23:45


    3/5/25 02:10





 


 Midodrine


  (Proamatine


 Tablet)  10 mg  TID@0600,1200,1800


 PO


   3/5/25 06:00


    3/5/25 12:53





 


 Phenylephrine HCl  250 ml @ 


 30 mls/hr  Q8H20M


 IV


   3/5/25 03:30


     





 


 Peritoneal


 Dialysis Solution  2,000 ml @ 


 0 mls/hr  Q6HR


 IP


   3/5/25 18:00


   UNV  














Family History:  


Cerebrovascular accident (CVA)


  G8 FATHER


Diabetes mellitus


  G8 FATHER


Hypertension


  G8 FATHER


Rectal cancer


  G8 MOTHER





Review of Systems


As documented in HPI





H&P Exam


Vital Signs/I&O





                                   Vital Sign








  Date Time  Temp Pulse Resp B/P (MAP) Pulse Ox O2 Delivery O2 Flow Rate FiO2


 


3/5/25 15:30  107      


 


3/5/25 15:19   14   Nasal Cannula* 2 28


 


3/5/25 15:00     92   


 


3/5/25 14:50 98.4       





 98.4       














                               Intake and Output  


 


 3/4/25 3/5/25





 19:00 07:00


 


Intake Total 900 ml 549.16 ml


 


Balance 900 ml 549.16 ml


 


  


 


Intake Oral  250 ml


 


IV Total 900 ml 299.16 ml








Physical Exam


General-not in any distress


 HEENT-normocephalic, no icterus, no pallor, neck supple


Respiratory-fair air entry bilateral, 


Cardiovascular-S1-S2 heard, tachycardic


Abdominal-soft, nontender, nondistended


Musculoskeletal-no pedal edema, no calf tenderness


Genitourinary-deferred


Neuro-awake alert oriented x3,


Psychiatric-not agitated, cooperative,





Labs/Diagnostic Data


Labs/Diagnostic Data





                                Laboratory Tests








Test


 3/5/25


05:38 3/5/25


02:00 3/4/25


21:35 3/4/25


19:03 Range/Units


 


 


Sodium Level 134 L   137 # 136-145  mmol/L


 


Potassium Level 5.0  4.7   5.9 *H 3.5-5.1  mmol/L


 


Chloride Level 93 L   101    mmol/L


 


Carbon Dioxide Level 25    26  20-31  mmol/L


 


Anion Gap 16 H   10  5-15  


 


Blood Urea Nitrogen 34 H   30 H 9-23  mg/dL


 


Creatinine


 6.12 H


 


 


 6.19 H


 0.550-1.02


mg/dL


 


Glomerular Filtration Rate


Calc 6 


 


 


 6 


 >90  mL/min





 


BUN/Creatinine Ratio 5.6 L   4.8 L 10.0-20.0  


 


Serum Glucose 141 H   92    mg/dL


 


Calcium Level 8.8    8.3 L 8.7-10.4  mg/dL


 


Random Vancomycin Level 16.6 H    5-10  ug/mL


 


POC Glucose   128 H    mg/dl


 


Test


 3/4/25


14:50 3/4/25


13:00 3/4/25


11:43 


 Range/Units


 


 


Lactic Acid Level 3.3 *H 4.1 *H   0.4-2.0  mmol/L


 


Troponin I High Sensitivity 26  26  26   </=34  ng/L


 


White Blood Count


 


 


 9.6 


 


 4.4-10.8


10^3/uL


 


Red Blood Count


 


 


 3.97 L


 


 4.0-5.20


10^6/uL


 


Hemoglobin   11.6 L  12.2-16.2  g/dL


 


Hematocrit   36.4   36.0-46.0  %


 


Mean Corpuscular Volume   91.9   80.0-100.0  fL


 


Mean Corpuscular Hemoglobin   29.2   28.0-32.0  pg


 


Mean Corpuscular Hemoglobin


Concent 


 


 31.8 L


 


 32.0-36.0  g/dL





 


Red Cell Distribution Width   16.6 H  11.8-14.3  %


 


Platelet Count


 


 


 298 


 


 140-450


10^3/uL


 


Mean Platelet Volume   6.9   6.9-10.8  fL


 


Neutrophils (%) (Auto)   69.7   37.0-80.0  %


 


Lymphocytes (%) (Auto)   20.7   10.0-50.0  %


 


Monocytes (%) (Auto)   8.7   0.0-12.0  %


 


Eosinophils (%) (Auto)   0.5   0.0-7.0  %


 


Basophils (%) (Auto)   0.4   0.0-2.0  %


 


Neutrophils # (Auto)


 


 


 6.7 


 


 1.6-8.6  10


^3/uL


 


Lymphocytes # (Auto)


 


 


 2.0 


 


 0.4-5.4  10


^3/uL


 


Monocytes # (Auto)   0.8   0-1.3  10 ^3/uL


 


Eosinophils # (Auto)   0   0-0.8  10 ^3/uL


 


Basophils # (Auto)   0   0-0.2  10 ^3/uL


 


Nucleated Red Blood Cells   0.4    %


 


Sodium Level   131 L  136-145  mmol/L


 


Potassium Level   5.7 *H  3.5-5.1  mmol/L


 


Chloride Level   98     mmol/L


 


Carbon Dioxide Level   19 L  20-31  mmol/L


 


Anion Gap   14   5-15  


 


Blood Urea Nitrogen   25 H  9-23  mg/dL


 


Creatinine


 


 


 6.08 H


 


 0.550-1.02


mg/dL


 


Glomerular Filtration Rate


Calc 


 


 6 


 


 >90  mL/min





 


BUN/Creatinine Ratio   4.1 L  10.0-20.0  


 


Serum Glucose   93     mg/dL


 


Calcium Level   8.6 L  8.7-10.4  mg/dL


 


B-Type Natriuretic Peptide   250.70   0-100  pg/mL











Assessment


ESRD on peritoneal dialysis


Shock


AFib


Hyperkalemia


Lactic acidosis








recs


PD as ordered


pharmacy has no supplies and getting from outside


Fluid cell count and differential, Gram stain and cultures for peritoneal fluid 

ordered


Patient already on empiric IV broad-spectrum antibiotics ID following the case


We will follow closely


Plan discussed with:  Patient











AUSTEN DENNISON MD              Mar 5, 2025 09:31

## 2025-03-05 NOTE — DVHHP2
LIMA ANGUIANO NP 3/5/25 0311:


History of Present Illness


Reason for Visit:  Chest pain


History of Present Illness


Information in this HPI is limited due to the patient being a poor historian.  

Acquired with the assistance of the patient's daughter at the bedside.  

82-year-old female with past medical history of ESRD on PD, AFib diagnosed 

November 21, 2024, chronic abdominal pain, and diarrhea after taking lactulose 

presents after being sent in from urgent care for an abnormal EKG.  Patient 

initially went to urgent care with complaints of chest pain, recent diagnosis of

pneumonia and shortness of breath.  During the emergency department evaluation 

potassium levels found to be 5.7/5.9.  Patient also found to be AFib 130s.  

Patient was recently discharged from [facility] on November 21, 2024 on Eliquis 2.5 and amiodarone bid.  At this time patient endorses chest 

pain, generalized weakness, abdominal pain.  Denies fevers, chills, dizziness, 

leg swelling,


Cardiovascular:  AFIB


Renal/:  Chronic renal insuff


Smoke:  No


ALCOHOL:  none


Drugs:  None


Lives:  with Family





Review of Systems


Constitutional:  No: Fever, Chills, Sweats, Weakness, Malaise, Other


Eyes:  No: Pain, Vision change, Conjunctivae inflammation, Eyelid inflammation, 

Other, Redness


ENT:  No: Ear pain, Ear discharge, Nose pain, Nose discharge, Nose congestion, 

Mouth pain, Mouth swelling, Throat pain, Throat swelling, Other


Respiratory:  Shortness of breath; No: Cough, Dry, SOB with excertion, Wheezing,

Hemoptysis, Pleuritic Pain, Sputum, Wheezing, Other


Cardiovascular:  Chest Pain; No: Palpitations, Orthopnea, Paroxysmal Noc. 

Dyspnea, Edema, Lt Headedness, Other


Gastrointestinal:  Abdominal Pain, Diarrhea; No: Nausea, Vomiting, Constipation,

Melena, Hematochezia, Other


Genitourinary:  No Dysuria, No Frequency, No Incontinence, No Hematuria, No 

Retention, No Other


Musculoskeletal:  No: other, neck pain, shoulder pain, arm pain, back pain, hand

pain, leg pain, foot pain


Skin:  No: Rash, Lesions, Jaundice, Bruising, Other


Neurological:  No: Weakness, Numbness, Incoordination, Change in speech, 

Confusion, Seizures, Other


Allergies:  


Coded Allergies:  


     NO KNOWN ALLERGIES (Unverified , 12/23/22)


Medications





                               Current Medications








 Medications  Dose


 Ordered  Sig/Anthony


 Route  Start Time


 Stop Time Status Last Admin


Dose Admin


 


 Ondansetron HCl  4 mg  Q4HP  PRN


 IV  3/4/25 17:30


     





 


 Morphine Sulfate  2 mg  Q4HPRN  PRN


 IV  3/4/25 17:30


     





 


 Morphine Sulfate  2 mg  Q30M  PRN


 IV  3/4/25 17:30


     





 


 Sodium Chloride  1,000 ml @ 


 60 mls/hr  [id number]


 IV  3/4/25 17:30


    3/4/25 23:24


60 MLS/HR


 


 Acetaminophen/


 Hydrocodone Bitart  1 tab  Q4HP  PRN


 PO  3/4/25 17:30


     





 


 Acetaminophen  650 mg  Q6HP  PRN


 PO  3/4/25 17:30


    3/4/25 21:34


650 MG


 


 Nitroglycerin  0.4 mg  Q5MINP  PRN


 SL  3/4/25 17:30


     





 


 Aspirin  81 mg  DAILY


 PO  3/5/25 10:00


     





 


 Hydroxychloroquine


 Sulfate  200 mg  BID


 PO  3/4/25 22:00


    3/4/25 23:31


200 MG


 


 Levothyroxine


 Sodium  100 mcg  DAILY


 PO  3/5/25 10:00


     





 


 Atorvastatin


 Calcium  10 mg  HS


 PO  3/4/25 22:00


    3/4/25 23:31


10 MG


 


 Docusate Sodium  100 mg  BIDP  PRN


 PO  3/4/25 21:00


     





 


 Albuterol  2.5 mg  Q6HR


 NEB  3/5/25 00:00


     





 


 Sevelamer HCl  2,400 mg  TIDWM


 PO  3/5/25 08:00


     





 


 Zinc Sulfate  220 mg  DAILY


 PO  3/5/25 10:00


     





 


 Vancomycin HCl  0 ml @ 0


 mls/hr  UD


 IV  3/4/25 18:45


     





 


 Cefepime HCl  50 ml @ 


 12.5 mls/hr  DAILY


 IV  3/5/25 10:00


     





 


 Metronidazole  100 ml @ 


 100 mls/hr  Q8HR


 IV  3/4/25 22:00


    3/4/25 23:31


100 MLS/HR


 


 Budesonide  0.5 mg  BID


 NEB  3/4/25 22:00


     





 


 Norepinephrine


 Bitartrate  250 ml @ 


 3.75 mls/hr  Q24H


 IV  3/4/25 23:45


     














Exam


Vital Signs





Vital Signs








  Date Time  Temp Pulse Resp B/P (MAP) Pulse Ox O2 Delivery O2 Flow Rate FiO2


 


3/5/25 01:30  114 20 95/36 (55) 93   


 


3/5/25 01:00 97.7       





 97.7       


 


3/4/25 21:07      Nasal Cannula* 2 28








General Appearance:  Alert, Oriented X3, mild distress


HEENT:  Atraumatic, PERRLA, EOMI


Respiratory:  Clear to auscultation, Normal air movement


Cardiovascular:  Normal S1, Normal S2, Other (afib)


Abdominal:  Other (Diffuse tenderness to deep and light palpation)


Extremities:  No clubbing, No cyanosis, Normal pulses


Skin:  No rashes, No breakdown


Neuro:  Normal speech, Strength at 5/5 X4 ext


Psych/Mental Status:  Mental status NL, Mood NL





Labs/Xrays





                                      Labs








Test


 3/5/25


02:00 3/4/25


21:35 3/4/25


19:03 3/4/25


14:50 Range/Units


 


 


Potassium Level 4.7     3.5-5.1  mmol/L


 


POC Glucose  128 H     mg/dl


 


Sodium Level   137 #  136-145  mmol/L


 


Chloride Level   101     mmol/L


 


Carbon Dioxide Level   26   20-31  mmol/L


 


Anion Gap   10   5-15  


 


Blood Urea Nitrogen   30 H  9-23  mg/dL


 


Creatinine


 


 


 6.19 H


 


 0.550-1.02


mg/dL


 


Glomerular Filtration Rate


Calc 


 


 6 


 


 >90  mL/min





 


BUN/Creatinine Ratio   4.8 L  10.0-20.0  


 


Serum Glucose   92     mg/dL


 


Calcium Level   8.3 L  8.7-10.4  mg/dL


 


Lactic Acid Level    3.3 *H 0.4-2.0  mmol/L


 


Troponin I High Sensitivity    26  </=34  ng/L


 


Test


 3/4/25


11:43 


 


 


 Range/Units


 


 


White Blood Count


 9.6 


 


 


 


 4.4-10.8


10^3/uL


 


Red Blood Count


 3.97 L


 


 


 


 4.0-5.20


10^6/uL


 


Hemoglobin 11.6 L    12.2-16.2  g/dL


 


Hematocrit 36.4     36.0-46.0  %


 


Mean Corpuscular Volume 91.9     80.0-100.0  fL


 


Mean Corpuscular Hemoglobin 29.2     28.0-32.0  pg


 


Mean Corpuscular Hemoglobin


Concent 31.8 L


 


 


 


 32.0-36.0  g/dL





 


Red Cell Distribution Width 16.6 H    11.8-14.3  %


 


Platelet Count


 298 


 


 


 


 140-450


10^3/uL


 


Mean Platelet Volume 6.9     6.9-10.8  fL


 


Neutrophils (%) (Auto) 69.7     37.0-80.0  %


 


Lymphocytes (%) (Auto) 20.7     10.0-50.0  %


 


Monocytes (%) (Auto) 8.7     0.0-12.0  %


 


Eosinophils (%) (Auto) 0.5     0.0-7.0  %


 


Basophils (%) (Auto) 0.4     0.0-2.0  %


 


Neutrophils # (Auto)


 6.7 


 


 


 


 1.6-8.6  10


^3/uL


 


Lymphocytes # (Auto)


 2.0 


 


 


 


 0.4-5.4  10


^3/uL


 


Monocytes # (Auto) 0.8     0-1.3  10 ^3/uL


 


Eosinophils # (Auto) 0     0-0.8  10 ^3/uL


 


Basophils # (Auto) 0     0-0.2  10 ^3/uL


 


Nucleated Red Blood Cells 0.4      %


 


B-Type Natriuretic Peptide 250.70     0-100  pg/mL











Assessment/Plan


Assessment/Plan


Chest pain


Hyperkalemia


ESRD on PD


Afib uncontrolled


Hypotension


Lactic Acidosis 


Chronic abdominal pain








Plan


Upgraded to ICU





Cardiology consult. Echocardiogram. Vasopressors to maintain MAP > 65. 

Amiodarone drip per protocol. 





Nephrology consult. Monitor BMP. Correct electrolytes as needed.





Infectious disease consult. Blood cultures pending. IV ABX. 





CT abdomen pelvis without contrast pending Final interpretation.





GI ppx protonix / dvt ppx heparin sq


Plan discussed with:  Patient, Daughter


My Orders





                            Orders - LIMA ANGUIANO NP








Procedure Category Date Status





  Time 


 


Ct Ab Pel Wo Con-No CT 3/4/25 Taken





Oral Or Iv  22:40 


 


Transfer Orders XFER 3/4/25 Transmitted





  23:43 


 


Norepinephrine 8 PHA 3/4/25 In Process





Mg/250ml Kit  23:45 


 


Amiodarone PHA 3/5/25 In Process





360mg/200ml Premix  02:00 











Date of Service:  Mar 5, 2025


Billing Provider:  MARÍA OCONNELL MD


Common Visit Codes:  NOT BILLABLE





MARÍA OCONNELL MD 3/5/25 1338:


Review of Systems


Allergies:  


Coded Allergies:  


     NO KNOWN ALLERGIES (Unverified , 12/23/22)





Additional Comments


82-year-old female with past medical history of ESRD on PD, AFib diagnosed 

November 21, 2024, chronic abdominal pain, and diarrhea after taking lactulose 

presents after being sent in from urgent care for an abnormal EKG.  Patient 

initially went to urgent care with complaints of chest pain, recent diagnosis of

pneumonia and shortness of breath.  During the emergency department evaluation 

potassium levels found to be 5.7/5.9.  Patient also found to be AFib 130s.  

Patient was recently discharged from [facility] on November 21, 2024 on Eliquis 2.5 and amiodarone 


1. Chest pain MI ruled out


2. Hyperkalemia


3. End-stage renal disease on peritoneal dialysis


4. Hypotension with a relative history of hypertension


5. Chronic abdominal pain


6. Chronic AFib 


7. Hypothyroidism


8. Asthma/COPD 


-follow up blood cultures, titrate Levophed, resume beta blocker and amiodarone 

once blood pressure allows, continue Eliquis.











LIMA ANGUIANO NP                Mar 5, 2025 03:11


MARÍA OCONNELL MD              Mar 5, 2025 13:38

## 2025-03-05 NOTE — DVH
Exam: CT CT AB PEL WO CON-NO ORAL OR IV



History: abdominal pain



Comparison Study: 12/27/2022



TECHNIQUE: Multidetector CT of the abdomen was performed from lung bases to pubic symphysis. Imaging 
was performed without IV contrast. Axial, coronal and sagittal multiplanar reformats were obtained fr
om the axial data set by the technologist.



Radiation optimization: All CT scans at this facility use at least one of these dose optimization ellie
hniques: automated exposure control  mA and/or kV adjustment per patient size (includes targeted exam
s where dose is matched to clinical indication)  or iterative reconstruction.



Radiation Dose Information:



CT Dose: CTDI volume is 19.46 mGy. Dose-length product is 974.03 mGy*cm



FINDINGS: 



Evaluation of solid organs is limited due to lack of intravenous contrast use.



Findings: 



Imaged portions of the lung bases demonstrate subsegmental atelectasis in the left lung base.



The liver, spleen, pancreas appear unremarkable. Stable right adrenal nodule. The kidneys appear bila
terally atrophic with several hypo and hyperdense cysts. Cryoablation changes are noted in the left u
pper pole and left lower pole. Possible isodense lesion measuring 1.2 cm in the right midpole.



No evidence of bowel obstruction or focal bowel wall thickening.  Peritoneal catheter is present with
 tip looped above the urinary bladder. No significant free fluid, free air or adenopathy. Diffuse art
erial calcifications.



Compression fracture of L2 and possibly superior endplate of L1 with mild posterior extension resulti
ng in mild spinal canal stenosis.



IMPRESSION: 



1. No acute intra-abdominal finding



2. Isodense lesion of the right mid kidney measuring 1.2 cm, attention on follow-up is recommended.



3. Compression fracture of L2 and possibly superior endplate of L1 with mild posterior extension resu
lting in mild spinal canal stenosis, age-indeterminate



Electronically Signed by: Judi Aldana at 03/05/2025 03:48:27 AM

## 2025-03-05 NOTE — DVHPN2
Progress Note - Dictate


Date Seen:  Mar 5, 2025


Medical Necessity Reason


Pt with a Central, PICC or Fol:  Yes


Subjective


Peritoneal dialysis fluid drained. Patient unable to tolerate full 2 hour dwell 

time; only tolerated approximately 1 hour 30 minutes. Patient began to complain 

of abdominal pain, appeared pale, and vomit a small amount.


vital signs





                                   Vital Sign








  Date Time  Temp Pulse Resp B/P (MAP) Pulse Ox O2 Delivery O2 Flow Rate FiO2


 


3/5/25 21:30    90/42    


 


3/5/25 21:15  117 13  90   


 


3/5/25 19:30 98.1       





 98.1       


 


3/5/25 19:30      Nasal Cannula* 3 32














                           Total Intake and Output   


 


 3/4/25 3/4/25 3/5/25





 15:00 23:00 07:00


 


Intake Total 550 ml 400 ml 499.16 ml


 


Balance 550 ml 400 ml 499.16 ml








medications





                               Current Medications








 Medications  Dose


 Ordered  Sig/Anthony


 Route  Start Time


 Stop Time Status Last Admin


Dose Admin


 


 Ondansetron HCl  4 mg  Q4HP  PRN


 IV  3/4/25 17:30


    3/5/25 17:45


4 MG


 


 Morphine Sulfate  2 mg  Q4HPRN  PRN


 IV  3/4/25 17:30


    3/5/25 18:48


2 MG


 


 Morphine Sulfate  2 mg  Q30M  PRN


 IV  3/4/25 17:30


     





 


 Acetaminophen/


 Hydrocodone Bitart  1 tab  Q4HP  PRN


 PO  3/4/25 17:30


    3/5/25 08:40


1 TAB


 


 Acetaminophen  650 mg  Q6HP  PRN


 PO  3/4/25 17:30


    3/5/25 06:40


650 MG


 


 Nitroglycerin  0.4 mg  Q5MINP  PRN


 SL  3/4/25 17:30


     





 


 Aspirin  81 mg  DAILY


 PO  3/5/25 10:00


    3/5/25 10:24


81 MG


 


 Hydroxychloroquine


 Sulfate  200 mg  BID


 PO  3/4/25 22:00


    3/5/25 21:53


200 MG


 


 Levothyroxine


 Sodium  100 mcg  DAILY


 PO  3/5/25 10:00


    3/5/25 10:24


100 MCG


 


 Atorvastatin


 Calcium  10 mg  HS


 PO  3/4/25 22:00


    3/5/25 21:53


10 MG


 


 Docusate Sodium  100 mg  BIDP  PRN


 PO  3/4/25 21:00


     





 


 Albuterol  2.5 mg  Q6HR


 NEB  3/5/25 00:00


    3/5/25 19:04


2.5 MG


 


 Sevelamer HCl  2,400 mg  TIDWM


 PO  3/5/25 08:00


    3/5/25 18:28


2,400 MG


 


 Zinc Sulfate  220 mg  DAILY


 PO  3/5/25 10:00


    3/5/25 08:42


220 MG


 


 Vancomycin HCl  0 ml @ 0


 mls/hr  UD


 IV  3/4/25 18:45


     





 


 Cefepime HCl  50 ml @ 


 12.5 mls/hr  DAILY


 IV  3/5/25 10:00


    3/5/25 10:09


12.5 MLS/HR


 


 Metronidazole  100 ml @ 


 100 mls/hr  Q8HR


 IV  3/4/25 22:00


    3/5/25 21:53


100 MLS/HR


 


 Budesonide  0.5 mg  BID


 NEB  3/4/25 22:00


    3/5/25 11:51


0.5 MG


 


 Norepinephrine


 Bitartrate  250 ml @ 


 3.75 mls/hr  Q24H


 IV  3/4/25 23:45


    3/5/25 02:10


3.75 MLS/HR


 


 Midodrine  10 mg  TID@0600,1200,1800


 PO  3/5/25 06:00


    3/5/25 18:28


10 MG


 


 Phenylephrine HCl  250 ml @ 


 30 mls/hr  Q8H20M


 IV  3/5/25 03:30


     





 


 Peritoneal


 Dialysis Solution  2,000 ml @ 


 0 mls/hr  Q6HR


 IP  3/5/25 18:00


     











objective


General Appearance:  Alert, Oriented X3, mild distress


HEENT:  Atraumatic, PERRLA, EOMI


Respiratory:  Clear to auscultation, Normal air movement


Cardiovascular:  Normal S1, Normal S2, Other (afib)


Abdominal:  Other (Diffuse tenderness to deep and light palpation)


Extremities:  No clubbing, No cyanosis, Normal pulses


Skin:  No rashes, No breakdown


Neuro:  Normal speech, Strength at 5/5 X4 ext


Psych/Mental Status:  Mental status NL, Mood NL


laboratory and microbiology


                                Laboratory Tests


3/5/25 05:38








3/4/25 11:43

















Test


 3/5/25


05:38 Range/Units


 


 


Serum Glucose 141 H   mg/dL








Assessment/Plan


Patient is a 82-year-old female presents to the hospital with:





Hypotension


Lactic Acidosis 


Afib uncontrolled


Chest pain


Hyperkalemia


ESRD on PD


Chronic abdominal pain


Diarrhea 





Recommendations:


On pressor, hypotension is likely cardiac due to Afib with rvr vs sepsis





c/o abdominal pain, has PD catheter, recommend fluid analysis, follow cultures





Switched IV ceftriaxone to IV cefepime


cont IV Vancomycin


follow blood cultures





Antibiotic status:


Vancomycin IV [Started on 03/05]





03/04, Abdomen Pelvis CT showed No acute intra-abdominal finding. Isodense 

lesion of the right mid kidney measuring 1.2 cm, attention on follow-up is 

recommended.  


Compression fracture of L2 and possibly superior endplate of L1 with mild 

posterior extension resulting in mild spinal canal stenosis, age-indeterminate  





03/04, Blood culture showed no growth





critical time 35 minutes spent


prognosis gaurded





Thank you for consult.


Plan discussed with:  GELA Costa MD             Mar 5, 2025 22:05

## 2025-03-06 NOTE — DVHPN2
Progress Note - Dictate


Date Seen:  Mar 6, 2025


Medical Necessity Reason


Pt with a Central, PICC or Fol:  Yes


Subjective


pressors requirement slowly coming down


on amiodarone gtt


s/p peritoneal fluid analysis


vital signs





                                   Vital Sign








  Date Time  Temp Pulse Resp B/P (MAP) Pulse Ox O2 Delivery O2 Flow Rate FiO2


 


3/6/25 12:25  71 11  99   


 


3/6/25 11:19    98/38    


 


3/6/25 10:45 98.0       





 98.0       


 


3/6/25 08:00      Oxymizer 8 N/A














                           Total Intake and Output   


 


 3/5/25 3/5/25 3/6/25





 15:00 23:00 07:00


 


Intake Total 130.00 ml 2730.00 ml 1078.57 ml


 


Balance 130.00 ml 2730.00 ml 1078.57 ml








medications





                               Current Medications








 Medications  Dose


 Ordered  Sig/Anthony


 Route  Start Time


 Stop Time Status Last Admin


Dose Admin


 


 Ondansetron HCl  4 mg  Q4HP  PRN


 IV  3/4/25 17:30


    3/6/25 09:28


4 MG


 


 Morphine Sulfate  2 mg  Q4HPRN  PRN


 IV  3/4/25 17:30


    3/6/25 00:26


2 MG


 


 Morphine Sulfate  2 mg  Q30M  PRN


 IV  3/4/25 17:30


     





 


 Acetaminophen/


 Hydrocodone Bitart  1 tab  Q4HP  PRN


 PO  3/4/25 17:30


    3/6/25 10:04


1 TAB


 


 Acetaminophen  650 mg  Q6HP  PRN


 PO  3/4/25 17:30


    3/5/25 06:40


650 MG


 


 Nitroglycerin  0.4 mg  Q5MINP  PRN


 SL  3/4/25 17:30


     





 


 Aspirin  81 mg  DAILY


 PO  3/5/25 10:00


    3/6/25 10:04


81 MG


 


 Hydroxychloroquine


 Sulfate  200 mg  BID


 PO  3/4/25 22:00


    3/6/25 10:04


200 MG


 


 Levothyroxine


 Sodium  100 mcg  DAILY


 PO  3/5/25 10:00


    3/6/25 10:04


100 MCG


 


 Atorvastatin


 Calcium  10 mg  HS


 PO  3/4/25 22:00


    3/5/25 21:53


10 MG


 


 Docusate Sodium  100 mg  BIDP  PRN


 PO  3/4/25 21:00


     





 


 Sevelamer HCl  2,400 mg  TIDWM


 PO  3/5/25 08:00


    3/6/25 09:08


2,400 MG


 


 Zinc Sulfate  220 mg  DAILY


 PO  3/5/25 10:00


    3/6/25 10:04


220 MG


 


 Vancomycin HCl  0 ml @ 0


 mls/hr  UD


 IV  3/4/25 18:45


     





 


 Cefepime HCl  50 ml @ 


 12.5 mls/hr  DAILY


 IV  3/5/25 10:00


    3/6/25 10:03


12.5 MLS/HR


 


 Metronidazole  100 ml @ 


 100 mls/hr  Q8HR


 IV  3/4/25 22:00


    3/6/25 06:17


100 MLS/HR


 


 Budesonide  0.5 mg  BID


 NEB  3/4/25 22:00


    3/6/25 06:41


0.5 MG


 


 Norepinephrine


 Bitartrate  250 ml @ 


 3.75 mls/hr  Q24H


 IV  3/4/25 23:45


    3/6/25 04:34


7.5 MLS/HR


 


 Midodrine  10 mg  TID@0600,1200,1800


 PO  3/5/25 06:00


    3/6/25 06:17


10 MG


 


 Phenylephrine HCl  250 ml @ 


 30 mls/hr  Q8H20M


 IV  3/5/25 03:30


    3/6/25 09:58


105 MLS/HR


 


 Peritoneal


 Dialysis Solution  2,000 ml @ 


 0 mls/hr  Q6HR


 IP  3/5/25 18:00


    3/6/25 06:29


2,000 MLS/HR


 


 Levalbuterol HCl  0.625 mg  Q6HR


 NEB  3/6/25 06:00


    3/6/25 12:00


0.625 MG








objective


General Appearance:  Alert, Oriented X3, mild distress


HEENT:  Atraumatic, PERRLA, EOMI


Respiratory:  Clear to auscultation, Normal air movement


Cardiovascular:  Normal S1, Normal S2, Other (afib)


Abdominal:  non tender. 


Extremities:  No clubbing, No cyanosis, Normal pulses


Skin:  No rashes, No breakdown


Neuro:  Normal speech, Strength at 5/5 X4 ext


Psych/Mental Status:  Mental status NL, Mood NL


laboratory and microbiology


                                Laboratory Tests


3/6/25 03:30

















Test


 3/6/25


03:30 Range/Units


 


 


Serum Glucose 218 H   mg/dL








Assessment/Plan





Patient is a 82-year-old female presents to the hospital with:





Hypotension


Lactic Acidosis 


Afib uncontrolled


Chest pain


Hyperkalemia


ESRD on PD


Chronic abdominal pain


Diarrhea 





Recommendations:


On pressor, hypotension is likely cardiac due to Afib with rvr vs medication 

induced





s/p  Peritoneal fluid analysis: cell count not qualifying for peritonitis.  

follow cultures





cont IV cefepime


cont IV Vancomycin


follow blood cultures: no growth





Antibiotic status:


Vancomycin IV [Started on 03/05]


03/06, Vancomycin Random is 14.8. 


03/04, Abdomen Pelvis CT showed No acute intra-abdominal finding. Isodense 

lesion of the right mid kidney measuring 1.2 cm, attention on follow-up is 

recommended.  


Compression fracture of L2 and possibly superior endplate of L1 with mild 

posterior extension resulting in mild spinal canal stenosis, age-indeterminate  





03/04, Blood culture showed no growth





prognosis gaurded





Thank you for consult.


Plan discussed with:  GELA Costa MD             Mar 6, 2025 12:42

## 2025-03-06 NOTE — DVHSR
--------------- APPROVED REPORT --------------





EXAM: LIMITED Two-dimensional and M-mode echocardiogram with Doppler and color Doppler.



Blood Pressure: 95/54 mmHg



INDICATION

intermitten chest pain/ abnormal EKG



RISK FACTORS

Height: 4'11, Weight: 145



DIMENSIONS 

LVDd (3.8-5.7cm)LA (2D)2.5 (1.9-4.0cm)Aortic Root (2.0-3.7cm)

EF (%) 65.0 (55-70%)Rt. Atrium3.7 (1.9-4.0cm)Asc. Aorta cm



Mitral Valve

MitralMitral Stenosis

E wave0.66m/sMV Mean GR.mmHg

A wave0.87m/sMV Peak GR.mmHg

E/A ratio0.82D MVAcm2

DECEL [id number] 1/2 Timems



Aortic Valve

Aortic ValveAortic Stenosis

V11.29m/Adam Mean GR.4mmHg

V21.19m/Adam Peak GR.6mmHg

LVOT Diameter1.6 (1.8-2.4cm)Doppler AVA2.18cm2



 Other Information 

Quality : Technically LimitedRhythm : 

Technically limited study due to  patient position.body habitus.



Conclusion

lvef 70%

normal rv function

rv enlarged 

normal pericardium

limited study of valves

mac noted

## 2025-03-06 NOTE — DVHNC2
Central Line


Recorder of insertion practice:  


Occupation of :  Other (NP)


Indication:  Hypotension, Inability to obtain IV


Room prepared for procedure:  Yes


 performed hand hygien:  Yes


Maximal sterile barrier precau:  Mask/Eye shield, Sterile gown, Cap, Sterlie 

gloves, Large sterlie drape


Skin Preparation:  Chlorhexidine gluconate, Providine iodine


Skin preparation completely dr:  Yes


Insertion site:  Right, Internal jugular


Central line catheter type:  Non-tunneled-not dialysis


Number of lumens:  3


Central line exchanged over a:  No


Antiseptic ointment applied to:  Yes


Post Assessment:  Chest X-Ray, No Pneumothorax


Informed consent obtained:  Yes


Risks/benefits/alt described:  Yes





Date of Service:  Mar 6, 2025


Billing Provider:  CHRISTIAN WYMAN


Common Visit Codes:  PROCEDURE ONLY


Procedure Codes:  36556-[id number] NON-TUNNEL CV CATH











CHRISTIAN WYMAN            Mar 6, 2025 04:22

## 2025-03-06 NOTE — DVHPN2
Progress Note


Date Seen:  Mar 6, 2025


Medical Necessity Reason


Pt with a Central, PICC or Fol:  No





Subjective


Patient reports:  Other (in icu )


Review of Systems:  Deferred





Objective


vital signs





                                   Vital Sign








  Date Time  Temp Pulse Resp B/P (MAP) Pulse Ox O2 Delivery O2 Flow Rate FiO2


 


3/6/25 14:18    166/58    


 


3/6/25 12:25  71 11  99   


 


3/6/25 10:45 98.0       





 98.0       


 


3/6/25 08:00      Oxymizer 8 N/A














                           Total Intake and Output   


 


 3/5/25 3/5/25 3/6/25





 15:00 23:00 07:00


 


Intake Total 130.00 ml 2730.00 ml 1078.57 ml


 


Balance 130.00 ml 2730.00 ml 1078.57 ml








medications





                               Current Medications








 Medications  Dose


 Ordered  Sig/Anthony


 Route  Start Time


 Stop Time Status Last Admin


Dose Admin


 


 Ondansetron HCl  4 mg  Q4HP  PRN


 IV  3/4/25 17:30


    3/6/25 09:28


4 MG


 


 Morphine Sulfate  2 mg  Q4HPRN  PRN


 IV  3/4/25 17:30


    3/6/25 00:26


2 MG


 


 Morphine Sulfate  2 mg  Q30M  PRN


 IV  3/4/25 17:30


     





 


 Acetaminophen/


 Hydrocodone Bitart  1 tab  Q4HP  PRN


 PO  3/4/25 17:30


    3/6/25 10:04


1 TAB


 


 Acetaminophen  650 mg  Q6HP  PRN


 PO  3/4/25 17:30


    3/5/25 06:40


650 MG


 


 Nitroglycerin  0.4 mg  Q5MINP  PRN


 SL  3/4/25 17:30


     





 


 Aspirin  81 mg  DAILY


 PO  3/5/25 10:00


    3/6/25 10:04


81 MG


 


 Hydroxychloroquine


 Sulfate  200 mg  BID


 PO  3/4/25 22:00


    3/6/25 10:04


200 MG


 


 Levothyroxine


 Sodium  100 mcg  DAILY


 PO  3/5/25 10:00


    3/6/25 10:04


100 MCG


 


 Atorvastatin


 Calcium  10 mg  HS


 PO  3/4/25 22:00


    3/5/25 21:53


10 MG


 


 Docusate Sodium  100 mg  BIDP  PRN


 PO  3/4/25 21:00


     





 


 Sevelamer HCl  2,400 mg  TIDWM


 PO  3/5/25 08:00


    3/6/25 09:08


2,400 MG


 


 Zinc Sulfate  220 mg  DAILY


 PO  3/5/25 10:00


    3/6/25 10:04


220 MG


 


 Vancomycin HCl  0 ml @ 0


 mls/hr  UD


 IV  3/4/25 18:45


     





 


 Cefepime HCl  50 ml @ 


 12.5 mls/hr  DAILY


 IV  3/5/25 10:00


    3/6/25 10:03


12.5 MLS/HR


 


 Metronidazole  100 ml @ 


 100 mls/hr  Q8HR


 IV  3/4/25 22:00


    3/6/25 14:37


100 MLS/HR


 


 Budesonide  0.5 mg  BID


 NEB  3/4/25 22:00


    3/6/25 06:41


0.5 MG


 


 Norepinephrine


 Bitartrate  250 ml @ 


 3.75 mls/hr  Q24H


 IV  3/4/25 23:45


    3/6/25 04:34


7.5 MLS/HR


 


 Midodrine  10 mg  TID@0600,1200,1800


 PO  3/5/25 06:00


    3/6/25 13:00


10 MG


 


 Peritoneal


 Dialysis Solution  2,000 ml @ 


 0 mls/hr  Q6HR


 IP  3/5/25 18:00


    3/6/25 12:55


2 MLS/HR


 


 Levalbuterol HCl  0.625 mg  Q6HR


 NEB  3/6/25 06:00


    3/6/25 12:00


0.625 MG


 


 Phenylephrine HCl


 80 mg/Sodium


 Chloride  250 ml @ 


 7.5 mls/hr  Q24H


 IV  3/6/25 15:00


     











Examination:  GENERAL:Normal, LUNGS:Abnormal, SKIN:Abnormal, NEURO:Normal


laboratory and microbiology


                                Laboratory Tests


3/6/25 03:30

















Test


 3/6/25


03:30 Range/Units


 


 


Serum Glucose 218 H   mg/dL








                                  Microbiology








 Date/Time


Source Procedure


Growth Status





 


 3/6/25 05:30


Peritoneal Fluid Gram Stain - Final


 Resulted


 


 3/6/25 05:30


Peritoneal Fluid Body Fluid Culture


Pending Resulted


 


 3/5/25 23:00


Nose MRSA Screen - Final


 Complete


 


 3/4/25 13:09


Blood Blood Culture - Preliminary


NO GROWTH AFTER 48 HOURS OF INCUBATION. Resulted











Problem List/Assessment/Plan


Problem List/Assessment/Plan


ESRD on peritoneal dialysis


Shock


AFib


Hyperkalemia


Lactic acidosis








recs


PD as ordered


Fluid cell count and differential, Gram stain and cultures for peritoneal fluid 

ordered


Patient already on empiric IV broad-spectrum antibiotics ID following the case


We will follow closely


k better


on vasopressors


Plan discussed with:  Patient





My Orders


My Orders





                           Orders - AUSTEN DENNISON MD








Procedure Category Date Status





  Time 


 


Body Fluid Culture W/ SLOAN 3/5/25 In Process





GS  09:29 


 


Peritoneal Dialysis PHA 3/5/25 In Process





2.5% Soln  18:00 

















AUSTEN DENNISON MD              Mar 6, 2025 15:47

## 2025-03-06 NOTE — DVH
CHEST RADIOGRAPH



Indication: CENTRAL LINE PLACEMENT



Technique: Single frontal view of the chest was obtained



Comparison: XY CHEST PORTABLE on DOS: 3/4/25, CHEST PORTABLE on DOS: 12/27/22, CXRP on DOS: 12/27/22



IMPRESSION: 



Right central line tip is at the cavoatrial junction, right atrium.  Consider retraction of approxima
tely 2 cm for ideal positioning. Otherwise no interval change.  No pneumothorax.



Electronically Signed by: Judi Aldana at 03/06/2025 03:56:07 AM

## 2025-03-06 NOTE — DVHPN2
Subjective


Overnight events noted.  Patient remains on IV vasopressor.


Reviewed:  Care Plan


Changes from previous H/P or p:  No Changes


Eyes:  No Pain, No Vision change, No Conjunctivae inflammation, No Eyelid 

inflammation, No Other, No Redness


ENT:  No Ear pain, No Ear discharge, No Nose pain, No Nose discharge, No Nose 

congestion, No Mouth pain, No Mouth swelling, No Throat pain, No Throat 

swelling, No Other


Cardiovascular:  Chest Pain; No Palpitations, No Orthopnea, No Paroxysmal Noc. 

Dyspnea, No Edema, No Lt Headedness, No Other


Respiratory:  No Cough, No Dry; Shortness of breath; No SOB with excertion, No 

Wheezing, No Hemoptysis, No Pleuritic Pain, No Sputum, No Other


Gastrointestinal:  No Nausea, No Vomiting; Abdominal Pain, Diarrhea; No 

Constipation, No Melena, No Hematochezia, No Other


Genitourinary:  No Dysuria, No Frequency, No Incontinence, No Hematuria, No 

Retention, No Other


Musculoskeletal:  No other, No neck pain, No shoulder pain, No arm pain, No back

pain, No hand pain, No leg pain, No foot pain


Skin:  No Rash, No Lesions, No Jaundice, No Bruising, No Other





Objective


Vitals





Vital Signs








  Date Time  Temp Pulse Resp B/P (MAP) Pulse Ox O2 Delivery O2 Flow Rate FiO2


 


3/6/25 14:18    166/58    


 


3/6/25 12:25  71 11  99   


 


3/6/25 10:45 98.0       





 98.0       


 


3/6/25 08:00      Oxymizer 8 N/A








Intake/Output











                               Intake and Output 


 


 3/6/25





 07:00


 


Intake Total 3938.57 ml


 


Balance 3938.57 ml


 


 


 


Intake Oral 2750 ml


 


IV Total 1188.57 ml


 


# Voids 1








Exam


HEENT pupils are reactive 


Neck is supple 


CV is S1-S2 regular rate and rhythm 


Respiratory diminished breath sounds bases 


GI positive bowel sound


Extremity no edema 


CNS no motor deficits


Medications





                               Current Medications








 Medications  Dose


 Ordered  Sig/Anthony


 Route  Start Time


 Stop Time Status Last Admin


Dose Admin


 


 Ondansetron HCl  4 mg  Q4HP  PRN


 IV  3/4/25 17:30


    3/6/25 09:28


4 MG


 


 Morphine Sulfate  2 mg  Q4HPRN  PRN


 IV  3/4/25 17:30


    3/6/25 00:26


2 MG


 


 Morphine Sulfate  2 mg  Q30M  PRN


 IV  3/4/25 17:30


     





 


 Acetaminophen/


 Hydrocodone Bitart  1 tab  Q4HP  PRN


 PO  3/4/25 17:30


    3/6/25 10:04


1 TAB


 


 Acetaminophen  650 mg  Q6HP  PRN


 PO  3/4/25 17:30


    3/5/25 06:40


650 MG


 


 Nitroglycerin  0.4 mg  Q5MINP  PRN


 SL  3/4/25 17:30


     





 


 Aspirin  81 mg  DAILY


 PO  3/5/25 10:00


    3/6/25 10:04


81 MG


 


 Hydroxychloroquine


 Sulfate  200 mg  BID


 PO  3/4/25 22:00


    3/6/25 10:04


200 MG


 


 Levothyroxine


 Sodium  100 mcg  DAILY


 PO  3/5/25 10:00


    3/6/25 10:04


100 MCG


 


 Atorvastatin


 Calcium  10 mg  HS


 PO  3/4/25 22:00


    3/5/25 21:53


10 MG


 


 Docusate Sodium  100 mg  BIDP  PRN


 PO  3/4/25 21:00


     





 


 Sevelamer HCl  2,400 mg  TIDWM


 PO  3/5/25 08:00


    3/6/25 09:08


2,400 MG


 


 Zinc Sulfate  220 mg  DAILY


 PO  3/5/25 10:00


    3/6/25 10:04


220 MG


 


 Vancomycin HCl  0 ml @ 0


 mls/hr  UD


 IV  3/4/25 18:45


     





 


 Cefepime HCl  50 ml @ 


 12.5 mls/hr  DAILY


 IV  3/5/25 10:00


    3/6/25 10:03


12.5 MLS/HR


 


 Metronidazole  100 ml @ 


 100 mls/hr  Q8HR


 IV  3/4/25 22:00


    3/6/25 14:37


100 MLS/HR


 


 Budesonide  0.5 mg  BID


 NEB  3/4/25 22:00


    3/6/25 06:41


0.5 MG


 


 Norepinephrine


 Bitartrate  250 ml @ 


 3.75 mls/hr  Q24H


 IV  3/4/25 23:45


    3/6/25 04:34


7.5 MLS/HR


 


 Midodrine  10 mg  TID@0600,1200,1800


 PO  3/5/25 06:00


    3/6/25 13:00


10 MG


 


 Peritoneal


 Dialysis Solution  2,000 ml @ 


 0 mls/hr  Q6HR


 IP  3/5/25 18:00


    3/6/25 12:55


2 MLS/HR


 


 Levalbuterol HCl  0.625 mg  Q6HR


 NEB  3/6/25 06:00


    3/6/25 12:00


0.625 MG


 


 Phenylephrine HCl


 80 mg/Sodium


 Chloride  250 ml @ 


 7.5 mls/hr  Q24H


 IV  3/6/25 15:00


    3/6/25 15:00


21.563 MLS/HR











Laboratory Results


Laboratory Tests


3/6/25 03:30











Chemistry








Test


 3/6/25


03:30


 


Calcium Level


 8.3 mg/dL


(8.7-10.4)  L


 


Magnesium Level


 1.5 mg/dL


(1.6-2.6)  L








Urinalysis








Test


 3/4/25


16:30


 


Urine Color


 Colorless


(Yellow)


 


Urine Clarity Clear (Clear)  


 


Urine pH 7.5 (5.0-9.0)  


 


Urine Specific Gravity


 1.007


(1.001-1.035)


 


Urine Protein


 1+ (Negative)


H


 


Urine Ketones


 Negative


(Negative)


 


Urine Blood


 Negative /uL


(Negative)


 


Urine Nitrite


 Negative


(Negative)


 


Urine Bilirubin


 Negative


(Negative)


 


Urine Urobilinogen


 Normal mg/dL


(Negative)


 


Urine Leukocyte Esterase


 Negative /uL


(Negative)


 


Urine RBC


 6 /hpf (0 - 4)





 


Urine Microscopic WBC 3 /HPF (0-5)  


 


Urine Squamous Epithelial


Cells None seen /hpf


(<5)


 


Urine Bacteria


 None seen /hpf


(None Seen)


 


Urine Mucus


 Few (None


Seen)


 


Urine Glucose


 4+ mg/dL


(Normal)  H








Microbiology





                                  Microbiology








 Date/Time


Source Procedure


Growth Status





 


 3/6/25 05:30


Peritoneal Fluid Gram Stain - Final


 Resulted


 


 3/6/25 05:30


Peritoneal Fluid Body Fluid Culture


Pending Resulted


 


 3/5/25 23:00


Nose MRSA Screen - Final


 Complete


 


 3/4/25 13:09


Blood Blood Culture - Preliminary


NO GROWTH AFTER 48 HOURS OF INCUBATION. Resulted











Assessment/Plan


Assessment/Plan


82-year-old female with past medical history of ESRD on PD, AFib diagnosed 

November 21, 2024, chronic abdominal pain, and diarrhea after taking lactulose 

presents after being sent in from urgent care for an abnormal EKG.  Patient 

initially went to urgent care with complaints of chest pain, recent diagnosis of

pneumonia and shortness of breath.  During the emergency department evaluation 

potassium levels found to be 5.7/5.9.  Patient also found to be AFib 130s.  

Patient was recently discharged from [facility] on November 21, 2024 on Eliquis 2.5 and amiodarone 


1. Chest pain MI ruled out


2. Hyperkalemia, improved


3. End-stage renal disease on peritoneal dialysis


4. Hypotension with a relative history of hypertension


5. Chronic abdominal pain


6. Chronic AFib 


7. Hypothyroidism


8. Asthma/COPD 


-titrate vasopressor, continue empirical antibiotics, once final cultures are 

negative patient antibiotics can be discontinued 


Nephrology consultation appreciated, cardiology follow up.


Plan discussed with:  Patient, Other


My Orders





                           Orders - MARÍA OCONNELL MD








Procedure Category Date Status





  Time 


 


Vancomycin,Random LAB 3/7/25 Verified





  05:00 


 


Vancomycin Per [facility] 3/6/25 In Process





Pharmacy Protoc  16:00 


 


Sodium Chl 0.9% PHA 3/6/25 In Process





 (Ns...  15:00 











Date of Service:  Mar 6, 2025


Billing Provider:  MARÍA OCONNELL MD


Common Visit Codes:  NOT BILLABLE











MARÍA OCONNELL MD              Mar 6, 2025 17:03

## 2025-03-07 NOTE — DVHPN2
Subjective


Overnight events noted.  Patient's remains on Iam-Synephrine but brought down 

from 65 mcg to 25 mcg.


Reviewed:  Care Plan


Changes from previous H/P or p:  No Changes


Eyes:  No Pain, No Vision change, No Conjunctivae inflammation, No Eyelid 

inflammation, No Other, No Redness


ENT:  No Ear pain, No Ear discharge, No Nose pain, No Nose discharge, No Nose 

congestion, No Mouth pain, No Mouth swelling, No Throat pain, No Throat 

swelling, No Other


Cardiovascular:  Chest Pain; No Palpitations, No Orthopnea, No Paroxysmal Noc. 

Dyspnea, No Edema, No Lt Headedness, No Other


Respiratory:  No Cough, No Dry; Shortness of breath; No SOB with excertion, No 

Wheezing, No Hemoptysis, No Pleuritic Pain, No Sputum, No Other


Gastrointestinal:  No Nausea, No Vomiting; Abdominal Pain, Diarrhea; No 

Constipation, No Melena, No Hematochezia, No Other


Genitourinary:  No Dysuria, No Frequency, No Incontinence, No Hematuria, No 

Retention, No Other


Musculoskeletal:  No other, No neck pain, No shoulder pain, No arm pain, No back

pain, No hand pain, No leg pain, No foot pain


Skin:  No Rash, No Lesions, No Jaundice, No Bruising, No Other





Objective


Vitals





Vital Signs








  Date Time  Temp Pulse Resp B/P (MAP) Pulse Ox O2 Delivery O2 Flow Rate FiO2


 


3/7/25 08:45 97.7 79 32 101/39 (59) 96   





 97.7       


 


3/7/25 08:00      Nasal Cannula* 3 32








Intake/Output











                               Intake and Output 


 


 3/7/25





 07:00


 


Intake Total 4918.879 ml


 


Balance 4918.879 ml


 


 


 


Intake Oral 640 ml


 


IV Total 2278.879 ml


 


Intraperitoneal 2000 ml








Exam


HEENT pupils are reactive 


Neck is supple 


CV is S1-S2 regular rate and rhythm 


Respiratory diminished breath sounds bases 


GI positive bowel sound


Extremity no edema 


CNS no motor deficits


Medications





                               Current Medications








 Medications  Dose


 Ordered  Sig/Anthony


 Route  Start Time


 Stop Time Status Last Admin


Dose Admin


 


 Ondansetron HCl  4 mg  Q4HP  PRN


 IV  3/4/25 17:30


    3/6/25 09:28


4 MG


 


 Morphine Sulfate  2 mg  Q4HPRN  PRN


 IV  3/4/25 17:30


    3/6/25 00:26


2 MG


 


 Morphine Sulfate  2 mg  Q30M  PRN


 IV  3/4/25 17:30


     





 


 Acetaminophen/


 Hydrocodone Bitart  1 tab  Q4HP  PRN


 PO  3/4/25 17:30


    3/6/25 10:04


1 TAB


 


 Acetaminophen  650 mg  Q6HP  PRN


 PO  3/4/25 17:30


    3/5/25 06:40


650 MG


 


 Nitroglycerin  0.4 mg  Q5MINP  PRN


 SL  3/4/25 17:30


     





 


 Aspirin  81 mg  DAILY


 PO  3/5/25 10:00


    3/7/25 08:36


81 MG


 


 Hydroxychloroquine


 Sulfate  200 mg  BID


 PO  3/4/25 22:00


    3/7/25 08:36


200 MG


 


 Levothyroxine


 Sodium  100 mcg  DAILY


 PO  3/5/25 10:00


    3/7/25 08:35


100 MCG


 


 Atorvastatin


 Calcium  10 mg  HS


 PO  3/4/25 22:00


    3/6/25 22:25


10 MG


 


 Docusate Sodium  100 mg  BIDP  PRN


 PO  3/4/25 21:00


     





 


 Sevelamer HCl  2,400 mg  TIDWM


 PO  3/5/25 08:00


    3/7/25 08:35


2,400 MG


 


 Zinc Sulfate  220 mg  DAILY


 PO  3/5/25 10:00


    3/7/25 08:36


220 MG


 


 Budesonide  0.5 mg  BID


 NEB  3/4/25 22:00


    3/7/25 06:40


0.5 MG


 


 Norepinephrine


 Bitartrate  250 ml @ 


 3.75 mls/hr  Q24H


 IV  3/4/25 23:45


    3/6/25 04:34


7.5 MLS/HR


 


 Midodrine  10 mg  TID@0600,1200,1800


 PO  3/5/25 06:00


    3/7/25 05:28


10 MG


 


 Peritoneal


 Dialysis Solution  2,000 ml @ 


 0 mls/hr  Q6HR


 IP  3/5/25 18:00


    3/7/25 06:25


0 MLS/HR


 


 Levalbuterol HCl  0.625 mg  Q6HR


 NEB  3/6/25 06:00


    3/7/25 06:40


0.625 MG


 


 Phenylephrine HCl


 80 mg/Sodium


 Chloride  250 ml @ 


 7.5 mls/hr  Q24H


 IV  3/6/25 15:00


    3/7/25 03:32


16.875 MLS/HR


 


 Apixaban  2.5 mg  BID


 PO  3/7/25 22:00


   UNV  














Laboratory Results


Laboratory Tests


3/7/25 03:24











Chemistry








Test


 3/7/25


03:24


 


Albumin


 2.3 g/dL


(3.2-4.8)  L


 


Calcium Level


 7.4 mg/dL


(8.7-10.4)  L


 


Total Protein


 4.6 g/dL


(5.7-8.2)  L








LFT








Test


 3/7/25


03:24


 


Alanine Aminotransferase (ALT) 23 U/L (7-40)  


 


Alkaline Phosphatase


 105 U/L


()


 


Aspartate Amino Transferase


(AST) 23 U/L (13-40)





 


Total Bilirubin


 < 0.2 mg/dL


(0.2-1.0)  L








Urinalysis








Test


 3/4/25


16:30


 


Urine Color


 Colorless


(Yellow)


 


Urine Clarity Clear (Clear)  


 


Urine pH 7.5 (5.0-9.0)  


 


Urine Specific Gravity


 1.007


(1.001-1.035)


 


Urine Protein


 1+ (Negative)


H


 


Urine Ketones


 Negative


(Negative)


 


Urine Blood


 Negative /uL


(Negative)


 


Urine Nitrite


 Negative


(Negative)


 


Urine Bilirubin


 Negative


(Negative)


 


Urine Urobilinogen


 Normal mg/dL


(Negative)


 


Urine Leukocyte Esterase


 Negative /uL


(Negative)


 


Urine RBC


 6 /hpf (0 - 4)





 


Urine Microscopic WBC 3 /HPF (0-5)  


 


Urine Squamous Epithelial


Cells None seen /hpf


(<5)


 


Urine Bacteria


 None seen /hpf


(None Seen)


 


Urine Mucus


 Few (None


Seen)


 


Urine Glucose


 4+ mg/dL


(Normal)  H








Microbiology





                                  Microbiology








 Date/Time


Source Procedure


Growth Status





 


 3/6/25 05:30


Peritoneal Fluid Gram Stain - Final


 Resulted


 


 3/6/25 05:30


Peritoneal Fluid Body Fluid Culture - Preliminary


 Resulted


 


 3/5/25 23:00


Nose MRSA Screen - Final


 Complete


 


 3/4/25 13:09


Blood Blood Culture - Preliminary


NO GROWTH AFTER 48 HOURS OF INCUBATION. Resulted











Assessment/Plan


Assessment/Plan


82-year-old female with past medical history of ESRD on PD, AFib diagnosed 

November 21, 2024, chronic abdominal pain, and diarrhea after taking lactulose 

presents after being sent in from urgent care for an abnormal EKG.  Patient 

initially went to urgent care with complaints of chest pain, recent diagnosis of

pneumonia and shortness of breath.  During the emergency department evaluation 

potassium levels found to be 5.7/5.9.  Patient also found to be AFib 130s.  

Patient was recently discharged from [facility] on November 21, 2024 on Eliquis 2.5 and amiodarone 


1. Chest pain MI ruled out


2. Hyperkalemia, improved


3. End-stage renal disease on peritoneal dialysis


4. Hypotension with a relative history of hypertension, sepsis ruled out


5. Chronic abdominal pain


6. Chronic AFib 


7. Hypothyroidism


8. Asthma/COPD 


-resume Eliquis renal dose for primary prevention of stroke.


-titrate vasopressor, discontinue antibiotics, patient's blood culture and 

peritoneal fluid cultures are negative to date.  


Nephrology consultation appreciated, cardiology follow up.


Plan discussed with:  Patient, Other


My Orders





                           Orders - MARÍA OCONNELL MD








Procedure Category Date Status





  Time 


 


Vancomycin Per [facility] 3/6/25 In Process





Pharmacy Protoc  16:00 


 


Sodium Chl 0.9% PHA 3/6/25 In Process





 (Ns...  15:00 


 


Initiate Vte [facility] 3/7/25 In Process





Prophylaxis  08:30 


 


Vancomycin,Random LAB 3/8/25 Verified





  05:00 


 


Vancomycin Per [facility] 3/7/25 In Process





Pharmacy Protoc  23:59 


 


Lactic Acid W/ Reflex LAB 3/7/25 Logged





Order  10:55 


 


Apixaban (Eliquis) PHA 3/7/25 Logged





  22:00 











Date of Service:  Mar 7, 2025


Billing Provider:  MARÍA OCONNELL MD


Common Visit Codes:  NOT BILLABLE











MARÍA OCONNELL MD              Mar 7, 2025 11:23

## 2025-03-07 NOTE — DVHPN2
Progress Note - Dictate


Date Seen:  Mar 7, 2025


Medical Necessity Reason


Pt with a Central, PICC or Fol:  No


Subjective


Patient's remains on Iam-Synephrine but brought down from 65 mcg to 25 mcg.


no new complaints


vital signs





                                   Vital Sign








  Date Time  Temp Pulse Resp B/P (MAP) Pulse Ox O2 Delivery O2 Flow Rate FiO2


 


3/7/25 18:45  90 15 104/45 (64)    


 


3/7/25 18:30     98   


 


3/7/25 18:15      Nasal Cannula* 2 28


 


3/7/25 16:30 97.9       





 97.9       














                           Total Intake and Output   


 


 3/6/25 3/6/25 3/7/25





 15:00 23:00 07:00


 


Intake Total 1437.582 ml 892.961 ml 2588.336 ml


 


Balance 1437.582 ml 892.961 ml 2588.336 ml








medications





                               Current Medications








 Medications  Dose


 Ordered  Sig/Anthony


 Route  Start Time


 Stop Time Status Last Admin


Dose Admin


 


 Ondansetron HCl  4 mg  Q4HP  PRN


 IV  3/4/25 17:30


    3/6/25 09:28


4 MG


 


 Morphine Sulfate  2 mg  Q4HPRN  PRN


 IV  3/4/25 17:30


    3/6/25 00:26


2 MG


 


 Morphine Sulfate  2 mg  Q30M  PRN


 IV  3/4/25 17:30


     





 


 Acetaminophen/


 Hydrocodone Bitart  1 tab  Q4HP  PRN


 PO  3/4/25 17:30


    3/6/25 10:04


1 TAB


 


 Acetaminophen  650 mg  Q6HP  PRN


 PO  3/4/25 17:30


    3/5/25 06:40


650 MG


 


 Nitroglycerin  0.4 mg  Q5MINP  PRN


 SL  3/4/25 17:30


     





 


 Aspirin  81 mg  DAILY


 PO  3/5/25 10:00


    3/7/25 08:36


81 MG


 


 Hydroxychloroquine


 Sulfate  200 mg  BID


 PO  3/4/25 22:00


    3/7/25 08:36


200 MG


 


 Levothyroxine


 Sodium  100 mcg  DAILY


 PO  3/5/25 10:00


    3/7/25 08:35


100 MCG


 


 Atorvastatin


 Calcium  10 mg  HS


 PO  3/4/25 22:00


    3/6/25 22:25


10 MG


 


 Docusate Sodium  100 mg  BIDP  PRN


 PO  3/4/25 21:00


     





 


 Sevelamer HCl  2,400 mg  TIDWM


 PO  3/5/25 08:00


    3/7/25 18:15


2,400 MG


 


 Zinc Sulfate  220 mg  DAILY


 PO  3/5/25 10:00


    3/7/25 08:36


220 MG


 


 Budesonide  0.5 mg  BID


 NEB  3/4/25 22:00


    3/7/25 18:13


0.5 MG


 


 Norepinephrine


 Bitartrate  250 ml @ 


 3.75 mls/hr  Q24H


 IV  3/4/25 23:45


    3/6/25 04:34


7.5 MLS/HR


 


 Midodrine  10 mg  TID@0600,1200,1800


 PO  3/5/25 06:00


    3/7/25 18:15


10 MG


 


 Peritoneal


 Dialysis Solution  2,000 ml @ 


 0 mls/hr  Q6HR


 IP  3/5/25 18:00


    3/7/25 18:12


0 MLS/HR


 


 Levalbuterol HCl  0.625 mg  Q6HR


 NEB  3/6/25 06:00


    3/7/25 18:13


0.625 MG


 


 Phenylephrine HCl


 80 mg/Sodium


 Chloride  250 ml @ 


 7.5 mls/hr  Q24H


 IV  3/6/25 15:00


    3/7/25 17:58


12.188 MLS/HR


 


 Apixaban  2.5 mg  BID


 PO  3/7/25 22:00


     











objective


General Appearance:  Alert, Oriented X3, mild distress


HEENT:  Atraumatic, PERRLA, EOMI


Respiratory:  Clear to auscultation, Normal air movement


Cardiovascular:  Normal S1, Normal S2, Other (afib)


Abdominal:  Other (Diffuse tenderness to deep and light palpation)


Extremities:  No clubbing, No cyanosis, Normal pulses


Skin:  No rashes, No breakdown


Neuro:  Normal speech, Strength at 5/5 X4 ext


Psych/Mental Status:  Mental status NL, Mood NL


laboratory and microbiology


                                Laboratory Tests


3/7/25 03:24

















Test


 3/7/25


03:24 Range/Units


 


 


Serum Glucose 140 H   mg/dL








Assessment/Plan


Patient is a 82-year-old female presents to the hospital with:





Hypotension


Lactic Acidosis 


Afib uncontrolled


Chest pain


Hyperkalemia


ESRD on PD


Chronic abdominal pain


Diarrhea 





Recommendations:


On pressor, hypotension is likely cardiac due to Afib with rvr vs medication 

induced





s/p  Peritoneal fluid analysis: cell count not qualifying for peritonitis. 

cultures are also negative 





no clear source of infection, likely hypotension is non infectious. dc 

antibiotics and monitor, pressors slowly trending down





Antibiotic status:


Vancomycin IV [Started on 03/05 - Ongoing]


Cefepime IV [Started on 03/05 - Ongoing]





03/07, Vancomycin Random is 20.2. 





03/04, Abdomen Pelvis CT showed No acute intra-abdominal finding. Isodense 

lesion of the right mid kidney measuring 1.2 cm, attention on follow-up is 

recommended.  


Compression fracture of L2 and possibly superior endplate of L1 with mild 

posterior extension resulting in mild spinal canal stenosis, age-indeterminate  





03/04, Blood culture showed no growth





03/06, Fluid culture preliminary showed no growth





03/05, MRSA screening negative





prognosis gaurded





Thank you for consult.





Dietary Evaluation Review


Comments:  


Encourage high protein diet. consider Nepro 240ml 19, 432 kcal. PO  


supplements BID


Expected Outcomes/Goals:  


maintain protein levels WNL


Plan discussed with:  GELA Costa MD             Mar 7, 2025 20:31

## 2025-03-07 NOTE — DVHPN2
Progress Note


Date Seen:  Mar 7, 2025


Medical Necessity Reason


Pt with a Central, PICC or Fol:  No





Subjective


Patient reports:  Other (in icu)


Review of Systems:  Deferred





Objective


vital signs





                                   Vital Sign








  Date Time  Temp Pulse Resp B/P (MAP) Pulse Ox O2 Delivery O2 Flow Rate FiO2


 


3/7/25 11:44  76 18  100   


 


3/7/25 08:45 97.7   101/39 (59)    





 97.7       


 


3/7/25 08:00      Nasal Cannula* 3 32














                           Total Intake and Output   


 


 3/6/25 3/6/25 3/7/25





 15:00 23:00 07:00


 


Intake Total 1437.582 ml 892.961 ml 2588.336 ml


 


Balance 1437.582 ml 892.961 ml 2588.336 ml








medications





                               Current Medications








 Medications  Dose


 Ordered  Sig/Anthony


 Route  Start Time


 Stop Time Status Last Admin


Dose Admin


 


 Ondansetron HCl  4 mg  Q4HP  PRN


 IV  3/4/25 17:30


    3/6/25 09:28


4 MG


 


 Morphine Sulfate  2 mg  Q4HPRN  PRN


 IV  3/4/25 17:30


    3/6/25 00:26


2 MG


 


 Morphine Sulfate  2 mg  Q30M  PRN


 IV  3/4/25 17:30


     





 


 Acetaminophen/


 Hydrocodone Bitart  1 tab  Q4HP  PRN


 PO  3/4/25 17:30


    3/6/25 10:04


1 TAB


 


 Acetaminophen  650 mg  Q6HP  PRN


 PO  3/4/25 17:30


    3/5/25 06:40


650 MG


 


 Nitroglycerin  0.4 mg  Q5MINP  PRN


 SL  3/4/25 17:30


     





 


 Aspirin  81 mg  DAILY


 PO  3/5/25 10:00


    3/7/25 08:36


81 MG


 


 Hydroxychloroquine


 Sulfate  200 mg  BID


 PO  3/4/25 22:00


    3/7/25 08:36


200 MG


 


 Levothyroxine


 Sodium  100 mcg  DAILY


 PO  3/5/25 10:00


    3/7/25 08:35


100 MCG


 


 Atorvastatin


 Calcium  10 mg  HS


 PO  3/4/25 22:00


    3/6/25 22:25


10 MG


 


 Docusate Sodium  100 mg  BIDP  PRN


 PO  3/4/25 21:00


     





 


 Sevelamer HCl  2,400 mg  TIDWM


 PO  3/5/25 08:00


    3/7/25 12:03


2,400 MG


 


 Zinc Sulfate  220 mg  DAILY


 PO  3/5/25 10:00


    3/7/25 08:36


220 MG


 


 Budesonide  0.5 mg  BID


 NEB  3/4/25 22:00


    3/7/25 06:40


0.5 MG


 


 Norepinephrine


 Bitartrate  250 ml @ 


 3.75 mls/hr  Q24H


 IV  3/4/25 23:45


    3/6/25 04:34


7.5 MLS/HR


 


 Midodrine  10 mg  TID@0600,1200,1800


 PO  3/5/25 06:00


    3/7/25 12:03


10 MG


 


 Peritoneal


 Dialysis Solution  2,000 ml @ 


 0 mls/hr  Q6HR


 IP  3/5/25 18:00


    3/7/25 12:17


0 MLS/HR


 


 Levalbuterol HCl  0.625 mg  Q6HR


 NEB  3/6/25 06:00


    3/7/25 11:36


0.625 MG


 


 Phenylephrine HCl


 80 mg/Sodium


 Chloride  250 ml @ 


 7.5 mls/hr  Q24H


 IV  3/6/25 15:00


    3/7/25 03:32


16.875 MLS/HR


 


 Apixaban  2.5 mg  BID


 PO  3/7/25 22:00


     











Examination:  GENERAL:Abnormal (ill appearing), LUNGS:Normal, MSK:Abnormal, 

NEURO:Normal


laboratory and microbiology


                                Laboratory Tests


3/7/25 03:24

















Test


 3/7/25


03:24 Range/Units


 


 


Serum Glucose 140 H   mg/dL








                                  Microbiology








 Date/Time


Source Procedure


Growth Status





 


 3/6/25 05:30


Peritoneal Fluid Gram Stain - Final


 Resulted


 


 3/6/25 05:30


Peritoneal Fluid Body Fluid Culture - Preliminary


 Resulted


 


 3/5/25 23:00


Nose MRSA Screen - Final


 Complete


 


 3/4/25 13:09


Blood Blood Culture - Preliminary


NO GROWTH AFTER 48 HOURS OF INCUBATION. Resulted











Problem List/Assessment/Plan


Problem List/Assessment/Plan


ESRD on peritoneal dialysis


septic Shock


AFib


Hyperkalemia


Lactic acidosis








recs


PD as ordered-2.5 % q6 hrs


Fluid cell count and differential, Gram stain and cultures for peritoneal fluid 

ordered


Patient already on empiric IV broad-spectrum antibiotics ID following the case


We will follow closely


k replace prn


on vasopressors


Plan discussed with:  Patient, Other











AUSTEN DENNISON MD              Mar 7, 2025 13:00

## 2025-03-07 NOTE — ECG
[facility]

                                       

Test Date:    2025               Test Time:    02:28:40

Pat Name:     MARTIN ADAMS         Department:   icu

Patient ID:   [id number]           Room:         [address] [place] [address] A

Gender:       F                        Technician:   annabel

:          1942               Requested By: LIMA ANGUIANO

Order Number: [id number]        Reading MD:   Geoff Suarez

                                 Measurements

Intervals                              Axis          

Rate:         174                      P:            0

RI:           0                        QRS:          27

QRSD:         74                       T:            122

QT:           270                                    

QTc:          460                                    

                           Interpretive Statements

Atrial fibrillation with rapid V-rate

Low voltage, precordial leads

Repolarization abnormality, prob rate related

Electronically Signed On 3-8-2025 16:28:48 PST by Geoff Suarez



Please click the below link to view image of tracing.

## 2025-03-08 NOTE — DVHPN2
Progress Note


Date Seen:  Mar 8, 2025


Medical Necessity Reason


Pt with a Central, PICC or Fol:  Yes





Subjective


Patient reports:  Other


Review of Systems:  NEURO:Abnormal (headache)





Objective


vital signs





                                   Vital Sign








  Date Time  Temp Pulse Resp B/P (MAP) Pulse Ox O2 Delivery O2 Flow Rate FiO2


 


3/8/25 09:23    230/159    


 


3/8/25 07:21  91 18  100   


 


3/8/25 07:13      Nasal Cannula* 2 28


 


3/8/25 04:00 97.6       





 97.6       














                           Total Intake and Output   


 


 3/7/25 3/7/25 3/8/25





 15:00 23:00 07:00


 


Intake Total 2757.401 ml 3085.525 ml 757.399 ml


 


Output Total 2300 ml 2500 ml 4400 ml


 


Balance 457.401 ml 585.525 ml -3642.601 ml








medications





                               Current Medications








 Medications  Dose


 Ordered  Sig/Anthony


 Route  Start Time


 Stop Time Status Last Admin


Dose Admin


 


 Ondansetron HCl  4 mg  Q4HP  PRN


 IV  3/4/25 17:30


    3/7/25 22:20


4 MG


 


 Morphine Sulfate  2 mg  Q4HPRN  PRN


 IV  3/4/25 17:30


    3/7/25 22:21


2 MG


 


 Morphine Sulfate  2 mg  Q30M  PRN


 IV  3/4/25 17:30


     





 


 Acetaminophen/


 Hydrocodone Bitart  1 tab  Q4HP  PRN


 PO  3/4/25 17:30


    3/8/25 08:29


1 TAB


 


 Acetaminophen  650 mg  Q6HP  PRN


 PO  3/4/25 17:30


    3/5/25 06:40


650 MG


 


 Nitroglycerin  0.4 mg  Q5MINP  PRN


 SL  3/4/25 17:30


     





 


 Aspirin  81 mg  DAILY


 PO  3/5/25 10:00


    3/8/25 10:19


81 MG


 


 Hydroxychloroquine


 Sulfate  200 mg  BID


 PO  3/4/25 22:00


    3/8/25 10:19


200 MG


 


 Levothyroxine


 Sodium  100 mcg  DAILY


 PO  3/5/25 10:00


    3/8/25 10:19


100 MCG


 


 Atorvastatin


 Calcium  10 mg  HS


 PO  3/4/25 22:00


    3/7/25 22:20


10 MG


 


 Docusate Sodium  100 mg  BIDP  PRN


 PO  3/4/25 21:00


     





 


 Sevelamer HCl  2,400 mg  TIDWM


 PO  3/5/25 08:00


    3/8/25 08:17


2,400 MG


 


 Zinc Sulfate  220 mg  DAILY


 PO  3/5/25 10:00


    3/8/25 10:18


220 MG


 


 Budesonide  0.5 mg  BID


 NEB  3/4/25 22:00


    3/8/25 07:09


0.5 MG


 


 Norepinephrine


 Bitartrate  250 ml @ 


 3.75 mls/hr  Q24H


 IV  3/4/25 23:45


    3/8/25 06:30


3.75 MLS/HR


 


 Midodrine  10 mg  TID@0600,1200,1800


 PO  3/5/25 06:00


    3/8/25 06:27


10 MG


 


 Levalbuterol HCl  0.625 mg  Q6HR


 NEB  3/6/25 06:00


    3/8/25 07:09


0.625 MG


 


 Phenylephrine HCl


 80 mg/Sodium


 Chloride  250 ml @ 


 7.5 mls/hr  Q24H


 IV  3/6/25 15:00


    3/8/25 06:53


33.75 MLS/HR


 


 Apixaban  2.5 mg  BID


 PO  3/7/25 22:00


    3/8/25 10:19


2.5 MG


 


 Meropenem  50 ml @ 17


 mls/hr  BID


 IV  3/8/25 10:00


     





 


 Vancomycin HCl  0 ml @ 0


 mls/hr  UD


 IV  3/8/25 09:00


     











Examination:  GENERAL:Normal, LUNGS:Abnormal, MSK:Normal, NEURO:Normal


laboratory and microbiology


                                Laboratory Tests


3/8/25 03:11

















Test


 3/8/25


03:11 Range/Units


 


 


Serum Glucose 173 H   mg/dL








                                  Microbiology








 Date/Time


Source Procedure


Growth Status





 


 3/6/25 05:30


Peritoneal Fluid Gram Stain - Final


 Resulted


 


 3/6/25 05:30


Peritoneal Fluid Body Fluid Culture - Preliminary


 Resulted


 


 3/5/25 23:00


Nose MRSA Screen - Final


 Complete


 


 3/4/25 13:09


Blood Blood Culture - Preliminary


NO GROWTH AFTER 72 HOURS OF INCUBATION. Resulted











Problem List/Assessment/Plan


Problem List/Assessment/Plan


ESRD on peritoneal dialysis


septic Shock


AFib


Hyperkalemia


Lactic acidosis








recs


hold PD for 1 day ,, will keep pt on slight + balance as bp low and high lactate


Fluid cell count and differential, Gram stain and cultures for peritoneal fluid 

ordered


abx per id 


We will follow closely


k replace prn


on vasopressors


ns for 1 liter


Plan discussed with:  Patient





My Orders


My Orders





                           Orders - AUSTEN DENNISON MD








Procedure Category Date Status





  Time 


 


Communication Order ORDERS 3/7/25 Transmitted





  13:49 


 


Sodium Chloride 0.9% PHA 3/8/25 In Process





  09:45 











Dietary Evaluation Review


Comments:  


Encourage high protein diet. consider Nepro 240ml 19, 432 kcal. PO  


supplements BID


Expected Outcomes/Goals:  


maintain protein levels WNL


Critical Care Time (mins):  40











AUSTEN DENNISON MD              Mar 8, 2025 10:25

## 2025-03-08 NOTE — DVHPN2
Progress Note - Dictate


Date Seen:  Mar 8, 2025


Medical Necessity Reason


Pt with a Central, PICC or Fol:  Yes


Subjective


overnight, the pressor requirement went up


I got a call at 9:24AM about the status change. 


lactic acid trending up


ordered stat IV Vancomycin pharmacy and meropenem renally dosed.


repeat two sets of blood cultures





Saw her in afternoon, on 2 pressors. She is edematous. RN was concerned them BP 

cuff wast measuring accurate blood pressure due to arm edema


HR in 100s


vital signs





                                   Vital Sign








  Date Time  Temp Pulse Resp B/P (MAP) Pulse Ox O2 Delivery O2 Flow Rate FiO2


 


3/8/25 18:45    88/49    


 


3/8/25 18:30  131 15  93   


 


3/8/25 18:17      Nasal Cannula 2.0 


 


3/8/25 18:17        28


 


3/8/25 08:00 97.7       





 97.7       














                           Total Intake and Output   


 


 3/7/25 3/7/25 3/8/25





 15:00 23:00 07:00


 


Intake Total 2757.401 ml 3085.525 ml 757.399 ml


 


Output Total 2300 ml 2500 ml 4400 ml


 


Balance 457.401 ml 585.525 ml -3642.601 ml








medications





                               Current Medications








 Medications  Dose


 Ordered  Sig/Anthony


 Route  Start Time


 Stop Time Status Last Admin


Dose Admin


 


 Ondansetron HCl  4 mg  Q4HP  PRN


 IV  3/4/25 17:30


    3/7/25 22:20


4 MG


 


 Morphine Sulfate  2 mg  Q4HPRN  PRN


 IV  3/4/25 17:30


    3/7/25 22:21


2 MG


 


 Morphine Sulfate  2 mg  Q30M  PRN


 IV  3/4/25 17:30


     





 


 Acetaminophen/


 Hydrocodone Bitart  1 tab  Q4HP  PRN


 PO  3/4/25 17:30


    3/8/25 08:29


1 TAB


 


 Acetaminophen  650 mg  Q6HP  PRN


 PO  3/4/25 17:30


    3/5/25 06:40


650 MG


 


 Nitroglycerin  0.4 mg  Q5MINP  PRN


 SL  3/4/25 17:30


     





 


 Aspirin  81 mg  DAILY


 PO  3/5/25 10:00


    3/8/25 10:19


81 MG


 


 Hydroxychloroquine


 Sulfate  200 mg  BID


 PO  3/4/25 22:00


    3/8/25 10:19


200 MG


 


 Levothyroxine


 Sodium  100 mcg  DAILY


 PO  3/5/25 10:00


    3/8/25 10:19


100 MCG


 


 Atorvastatin


 Calcium  10 mg  HS


 PO  3/4/25 22:00


    3/7/25 22:20


10 MG


 


 Docusate Sodium  100 mg  BIDP  PRN


 PO  3/4/25 21:00


     





 


 Sevelamer HCl  2,400 mg  TIDWM


 PO  3/5/25 08:00


    3/8/25 18:53


2,400 MG


 


 Zinc Sulfate  220 mg  DAILY


 PO  3/5/25 10:00


    3/8/25 10:18


220 MG


 


 Budesonide  0.5 mg  BID


 NEB  3/4/25 22:00


    3/8/25 18:16


0.5 MG


 


 Norepinephrine


 Bitartrate  250 ml @ 


 3.75 mls/hr  Q24H


 IV  3/4/25 23:45


    3/8/25 06:30


3.75 MLS/HR


 


 Midodrine  10 mg  TID@0600,1200,1800


 PO  3/5/25 06:00


    3/8/25 18:12


10 MG


 


 Levalbuterol HCl  0.625 mg  Q6HR


 NEB  3/6/25 06:00


    3/8/25 18:16


0.625 MG


 


 Phenylephrine HCl


 80 mg/Sodium


 Chloride  250 ml @ 


 7.5 mls/hr  Q24H


 IV  3/6/25 15:00


    3/8/25 06:53


33.75 MLS/HR


 


 Apixaban  2.5 mg  BID


 PO  3/7/25 22:00


    3/8/25 10:19


2.5 MG


 


 Meropenem  50 ml @ 17


 mls/hr  BID


 IV  3/8/25 10:00


    3/8/25 11:47


17 MLS/HR


 


 Vancomycin HCl  0 ml @ 0


 mls/hr  UD


 IV  3/8/25 09:00


     





 


 Sodium Chloride  1,000 ml @ 


 75 mls/hr  [id number]


 IV  3/8/25 15:30


    3/8/25 16:11


75 MLS/HR








objective


General Appearance:  Alert, , mild distress


HEENT:  Atraumatic, PERRLA, EOMI


Respiratory:  Clear to auscultation, Normal air movement


Cardiovascular:  Normal S1, Normal S2, Other (afib)


Abdominal:  non tender. 


Extremities:  No clubbing, No cyanosis, Normal pulses ,edema +


Skin:  No rashes, No breakdown


Neuro:  grossly intact


laboratory and microbiology


                                Laboratory Tests


3/8/25 03:11

















Test


 3/8/25


03:11 Range/Units


 


 


Serum Glucose 173 H   mg/dL








Assessment/Plan


Patient is a 82-year-old female presents to the hospital with:





Hypotension worsening


Lactic Acidosis 


Afib uncontrolled


Chest pain


Hyperkalemia


ESRD on PD


Chronic abdominal pain


Diarrhea 





Recommendations:


pressor requirement worsened overnight, on quad strength levo, 


restarted Vancomycin/ MEropenem empirically


recommend A line for accurate BP due  to arm edema


Pulm/ crit care on board


follow blood cx


no clear source of infection





hypotension is likely multifactorial cardiac due to Afib with rvr vs medication 

induced/ acidosis





s/p  Peritoneal fluid analysis: cell count not qualifying for peritonitis. 

cultures are also negative 





no clear source of infection, likely hypotension is non infectious. dc 

antibiotics and monitor, pressors slowly trending down





Antibiotic status:


Vancomycin IV [Started on 03/05 - Ongoing]


Cefepime IV [Started on 03/05 - Ongoing]





03/07, Vancomycin Random is 20.2. 





03/04, Abdomen Pelvis CT showed No acute intra-abdominal finding. Isodense 

lesion of the right mid kidney measuring 1.2 cm, attention on follow-up is 

recommended.  


Compression fracture of L2 and possibly superior endplate of L1 with mild 

posterior extension resulting in mild spinal canal stenosis, age-indeterminate  





03/04, Blood culture showed no growth





03/06, Fluid culture preliminary showed no growth





03/05, MRSA screening negative





critical care 35 minutes 





prognosis poor


plan discussed with RN





Thank you for consult.





Dietary Evaluation Review


Comments:  


Encourage high protein diet. consider Nepro 240ml 19, 432 kcal. PO  


supplements BID


Expected Outcomes/Goals:  


maintain protein levels WNL


Plan discussed with:  Other











GELA ELLISON MD             Mar 8, 2025 19:39 English

## 2025-03-08 NOTE — DVHINCON2
Date of service:  Mar 8, 2025


Referring Physician


Pedro Pollock MD





Reason for Consultation


Acute hypoxic respiratory failure





History of Present Illness


An 82-year-old woman with past medical history of ESRD on PD, AFib diagnosed 

11/21/2024, placed on Eliquis 2.5 and amiodarone b.i.d., chronic abdominal pain,

and diarrhea after taking lactulose who presented to ED on 3/4/25 after being 

sent in from urgent care for an abnormal EKG. Of note, HPI is limited due to pt 

being a poor historian, information provided by daughter at bedside.  Patient 

initially went to urgent care with complaints of chest pain, recent diagnosis of

pneumonia and shortness of breath.  On ED workup, potassium was 5.7/5.9.  She 

was noted to be in AFib in 130s.  At this time, patient c/o chest pain, 

generalized weakness and abdominal pain.  Denied fevers, chills, dizziness, leg 

swelling,  Patient was admitted for further care and pulmonary consultation is 

requested for evaluation and management of acute hypoxic respiratory failure.





Review of Systems:


14-point review of systems negative unless otherwise noted above.





Past Medical History:


ESRD on PD.


AFib diagnosed 11/21/2024, placed on Eliquis 2.5 and amiodarone b.i.d. 





Past Surgical History:


None





Medications:


Reviewed.





Allergies:


No known drug allergies.





Family History:


Cerebrovascular accident


Diabetes mellitus


Hypertension


Rectal cancer.





Social History:


Nonsmoker. No alcohol or illicit drug use.





Family History:  


Cerebrovascular accident (CVA)


  G8 FATHER


Diabetes mellitus


  G8 FATHER


Hypertension


  G8 FATHER


Rectal cancer


  G8 MOTHER





Allergies:  


Coded Allergies:  


     NO KNOWN ALLERGIES (Unverified , 12/23/22)


Home Meds


Active Scripts


Hydrocodone-Acetaminophen (Hydrocodone Bitartrate/AC 5-325 mg) 1 Tab Tab, 1 TAB 

PO Q8HP PRN, #14 TAB


   Prov:MARÍA OCONNELL MD         12/26/22


Erythromycin (Erythromycin) 5 Mg/Gm Oin, 1 DROP OP QID for 5 Days, #1 OIN 0 

Refills


   Prov:MARLEN GIPSON         5/3/22


Reported Medications


Zinc Gluconate (ZINC) 100 Mg Tab, 50 MG PO DAILY, TAB


   12/23/22


Aspirin (ASPIRIN 81) 81 Mg Tab, 81 MG PO DAILY, TAB


   12/23/22


Ferrous Sulfate (Ferrous Sulfate) 325 Mg Tab, 325 MG PO TIDWM for 30 Days


   12/23/22


Hydroxychloroquine Sulfate (Hydroxychloroquine Sulfat) 200 Mg Tab, 200 MG PO BID

for 30 Days, MG


   12/23/22


Docusate Sodium (Docusate Sodium) 100 Mg Tab, 100 MG PO BIDP for 30 Days, MG


   12/23/22


Atorvastatin Calcium (ATORVASTATIN CALCIUM) 10 Mg Tab, 1 TAB PO HS


   12/23/22


Potassium Chloride (Potassium Chloride ER) 10 Meq Tab, 1 TAB PO DAILY


   12/23/22


Alendronate Sodium (Alendronate Sodium) 70 Mg Tab, 1 TAB PO QWEEKLY


   12/23/22


Trazodone Hcl (Trazodone Hcl) 50 Mg Tab, 1 TAB PO HS


   12/23/22


Fluticasone Furoate-Vilanterol (Breo Ellipta 200-25 Mcg/INH) 1 Inh Inh, 1 PUFF 

INH DAILY


   12/23/22


Sevelamer Carbonate (Sevelamer Carbonate) 2.4 Gm Pow, 1 PKT PO TID


   12/23/22


Losartan Potassium (Losartan Potassium) 25 Mg Tab, 1 TAB PO BID


   12/23/22


Levothyroxine Sodium (Levothyroxine Sodium) 100 Mcg Tab, 1 TAB PO DAILY


   12/23/22


Hctz (Hydrochlorothiazide) 25 Mg Tab, 1 TAB PO DAILY


   12/23/22


Current Medications





                               Current Medications








 Medications


  (Trade)  Dose


 Ordered  Sig/Anthony


 Route


 PRN Reason  Start Time


 Stop Time Status Last Admin


 


 Apixaban


  (Eliquis)  2.5 mg  BID


 PO


   3/7/25 22:00


    3/8/25 10:19





 


 Meropenem  50 ml @ 17


 mls/hr  BID


 IV


   3/8/25 10:00


    3/8/25 11:47





 


 Vancomycin HCl  0 ml @ 0


 mls/hr  UD


 IV


   3/8/25 09:00


     





 


 Sodium Chloride  1,000 ml @ 


 75 mls/hr  [id number]


 IV


   3/8/25 15:30


    3/8/25 16:11














Vital Signs





                                   Vital Signs








  Date Time  Temp Pulse Resp B/P (MAP) Pulse Ox O2 Delivery O2 Flow Rate FiO2


 


3/8/25 18:30    154/112    


 


3/8/25 18:17  120 20  98   


 


3/8/25 18:17      Nasal Cannula 2.0 


 


3/8/25 18:17        28


 


3/8/25 08:00 97.7       





 97.7       








Physical Exam


Gen.: Patient lying in bed in no apparent distress. On supplemental oxygen.


Head: Normocephalic, atraumatic.


Eyes: EOMI/PERRLA.


Ears: Normal hearing. Normal anatomy.


Neck/trachea: Trachea midline, supple.


Nose: Normal external anatomy.


Mouth: Moist mucous membranes.


Chest: Decreased air entry bilaterally. No wheezing or rhonchi.


Cardiovascular: Positive S1, positive S2. Regular rate and rhythm.


Abdomen: Positive bowel sounds in all 4 quadrants. Soft, non-tender, non-

distended.


: Deferred.


Rectal: Deferred.


Skin: Warm, dry. Intact.


Extremities: 2+ radial pulses bilaterally. No lower extremity edema.


Neuro: Awake, alert, oriented x3. No gross motor or sensory deficits. Cranial 

nerves II through XII intact. Gait not assessed.





Labs/Diagnostic Data





                                      Labs








Test


 3/8/25


03:11 3/6/25


05:30 3/6/25


03:30 3/4/25


21:35 Range/Units


 


 


White Blood Count


 10.2 


 


 


 


 4.4-10.8


10^3/uL


 


Red Blood Count


 3.63 L


 


 


 


 4.0-5.20


10^6/uL


 


Hemoglobin 10.4 L    12.2-16.2  g/dL


 


Hematocrit 32.1 #L    36.0-46.0  %


 


Mean Corpuscular Volume 88.5     80.0-100.0  fL


 


Mean Corpuscular Hemoglobin 28.7     28.0-32.0  pg


 


Mean Corpuscular Hemoglobin


Concent 32.4 


 


 


 


 32.0-36.0  g/dL





 


Red Cell Distribution Width 16.5 H    11.8-14.3  %


 


Platelet Count


 340 


 


 


 


 140-450


10^3/uL


 


Mean Platelet Volume 6.8 L    6.9-10.8  fL


 


Neutrophils (%) (Auto) 79.2     37.0-80.0  %


 


Lymphocytes (%) (Auto) 14.7     10.0-50.0  %


 


Monocytes (%) (Auto) 5.6     0.0-12.0  %


 


Eosinophils (%) (Auto) 0.3     0.0-7.0  %


 


Basophils (%) (Auto) 0.2     0.0-2.0  %


 


Neutrophils # (Auto)


 8.1 


 


 


 


 1.6-8.6  10


^3/uL


 


Lymphocytes # (Auto)


 1.5 


 


 


 


 0.4-5.4  10


^3/uL


 


Monocytes # (Auto) 0.6     0-1.3  10 ^3/uL


 


Eosinophils # (Auto) 0     0-0.8  10 ^3/uL


 


Basophils # (Auto) 0     0-0.2  10 ^3/uL


 


Nucleated Red Blood Cells 0.4      %


 


Sodium Level 132 L    136-145  mmol/L


 


Potassium Level 4.0     3.5-5.1  mmol/L


 


Chloride Level 95 L      mmol/L


 


Carbon Dioxide Level 23     20-31  mmol/L


 


Anion Gap 14     5-15  


 


Blood Urea Nitrogen 23     9-23  mg/dL


 


Creatinine


 4.45 H


 


 


 


 0.550-1.02


mg/dL


 


Glomerular Filtration Rate


Calc 9 


 


 


 


 >90  mL/min





 


BUN/Creatinine Ratio 5.2 L    10.0-20.0  


 


Serum Glucose 173 H      mg/dL


 


Lactic Acid Level 5.0 *H    0.4-2.0  mmol/L


 


Calcium Level 8.1 L    8.7-10.4  mg/dL


 


Total Bilirubin < 0.2 L    0.2-1.0  mg/dL


 


Aspartate Amino Transferase


(AST) 21 


 


 


 


 13-40  U/L





 


Alanine Aminotransferase (ALT) 21     7-40  U/L


 


Alkaline Phosphatase 111       U/L


 


Total Protein 5.0 L    5.7-8.2  g/dL


 


Albumin 2.5 L    3.2-4.8  g/dL


 


Random Vancomycin Level 16.3 H    5-10  ug/mL


 


Body Fluid Source


 


 Peritoneal


fluid 


 


  





 


Body Fluid WBC (Manual)  160    0-200  CUMM


 


Body Fluid RBC (Manual)  120    0-2000  CUMM


 


Body Fluid Mononuclear Cells  70     %


 


Body Fluid Polymorphonuclear


Cells 


 30 H


 


 


 0-25  %





 


Magnesium Level   1.5 L  1.6-2.6  mg/dL


 


POC Glucose    128 H   mg/dl


 


Test


 3/4/25


16:30 3/4/25


14:50 3/4/25


11:43 


 Range/Units


 


 


Urine Color Colorless     Yellow  


 


Urine Clarity Clear     Clear  


 


Urine pH 7.5     5.0-9.0  


 


Urine Specific Gravity 1.007     1.001-1.035  


 


Urine Protein 1+ H    Negative  


 


Urine Ketones Negative     Negative  


 


Urine Blood Negative     Negative  /uL


 


Urine Nitrite Negative     Negative  


 


Urine Bilirubin Negative     Negative  


 


Urine Urobilinogen Normal     Negative  mg/dL


 


Urine Leukocyte Esterase Negative     Negative  /uL


 


Urine RBC 6     0 - 4  /hpf


 


Urine Microscopic WBC 3     0-5  /HPF


 


Urine Squamous Epithelial


Cells None seen 


 


 


 


 <5  /hpf





 


Urine Bacteria None seen     None Seen  /hpf


 


Urine Mucus Few     None Seen  


 


Urine Glucose 4+ H    Normal  mg/dL


 


Troponin I High Sensitivity  26    </=34  ng/L


 


B-Type Natriuretic Peptide   250.70   0-100  pg/mL








                                  Microbiology








 Date/Time


Source Procedure


Growth Status





 


 3/6/25 05:30


Peritoneal Fluid Gram Stain - Final


 Resulted


 


 3/6/25 05:30


Peritoneal Fluid Body Fluid Culture - Preliminary


 Resulted


 


 3/5/25 23:00


Nose MRSA Screen - Final


 Complete


 


 3/4/25 13:09


Blood Blood Culture - Preliminary


NO GROWTH AFTER 72 HOURS OF INCUBATION. Resulted











Assessment


Impression:


Acute hypoxic respiratory failure


Dependence on supplemental oxygen


Shock


Atrial fibrillation w/ RVR


Lactic acidosis


End-stage renal disease, on peritoneal dialysis


Obesity





Plan:


Supplemental oxygen 2 LPM NC


Titrate to keep O2 sats above 92%.


Taper O2 as tolerated.





Incentive spirometry





Continue antibiotics - meropenem


Follow up cultures





Amiodarone drip.


Cardiology recs appreciated.





On pressors for hemodynamic support


Levophed 2 mcg/min and Iam-Synephrine 180 mcg/min


Titrate to keep mean arterial pressure greater than 65 mmHg.





IV fluids with NS at 75 ml/hr.





Monitor renal function.


PD per Nephrology


Monitor electrolytes. Supplement as necessary.


Monitor ins and outs.


Follow up Nephrology recommendations





Diet and lifestyle modifications for weight reduction


Obesity - complicates all care





DVT prophylaxis.





Prognosis: Poor given patient's multiple co-morbidities.


Condition: Critical





Rest of plan per hospitalist and other consultants.





A total of 35 minutes of critical care time was spent reviewing the patient 

record, examining the patient, making a diagnostic and therapeutic plan, 

discussing this plan with the medical personnel, following up on diagnostic 

studies and following the patient for clinical stability excluding any and all 

procedures.  At least 50% of this time was spent in direct, face-to-face 

contact.





Thank you, Dr. Platt, for allowing me to participate in this patient's care.


Further recommendations will depend on the patient's clinical course.


Please do not hesitate to contact me if you have any questions or concerns.





This medical document was created using an electronic medical record system with

Dragon computerized dictation system. Although these documentations are being 

carefully reviewed, there may still be some phonetic and typographical changes. 

The errors are purely typographical, due to imperfection on the software 

program, and do not reflect any compromise in the patient's medical care.


Plan discussed with:  Patient, Other (BALDO Doran/Dr. Platt)











JOSEPH SUAREZ MD              Mar 8, 2025 18:51

## 2025-03-08 NOTE — DVHPN2
Progress Note - Dictate


Date Seen:  Mar 8, 2025


Medical Necessity Reason


Pt with a Central, PICC or Fol:  Yes


Subjective


Patient is seen and evaluated in the ICU discussed with the nurse.  Patient on 

multiple pressors and amiodarone drip.  Stable.  Patient received a L of IV 

fluids.  Peritoneal dialysis apparently stopped for now per Nephrology who 

evaluated her today.


vital signs





                                   Vital Sign








  Date Time  Temp Pulse Resp B/P (MAP) Pulse Ox O2 Delivery O2 Flow Rate FiO2


 


3/8/25 14:45    88/45    


 


3/8/25 12:54  99 18  100   


 


3/8/25 10:00      Nasal Cannula* 4 36


 


3/8/25 08:00 97.7       





 97.7       














                           Total Intake and Output   


 


 3/7/25 3/7/25 3/8/25





 15:00 23:00 07:00


 


Intake Total 2757.401 ml 3085.525 ml 757.399 ml


 


Output Total 2300 ml 2500 ml 4400 ml


 


Balance 457.401 ml 585.525 ml -3642.601 ml








medications





                               Current Medications








 Medications  Dose


 Ordered  Sig/Anthony


 Route  Start Time


 Stop Time Status Last Admin


Dose Admin


 


 Ondansetron HCl  4 mg  Q4HP  PRN


 IV  3/4/25 17:30


    3/7/25 22:20


4 MG


 


 Morphine Sulfate  2 mg  Q4HPRN  PRN


 IV  3/4/25 17:30


    3/7/25 22:21


2 MG


 


 Morphine Sulfate  2 mg  Q30M  PRN


 IV  3/4/25 17:30


     





 


 Acetaminophen/


 Hydrocodone Bitart  1 tab  Q4HP  PRN


 PO  3/4/25 17:30


    3/8/25 08:29


1 TAB


 


 Acetaminophen  650 mg  Q6HP  PRN


 PO  3/4/25 17:30


    3/5/25 06:40


650 MG


 


 Nitroglycerin  0.4 mg  Q5MINP  PRN


 SL  3/4/25 17:30


     





 


 Aspirin  81 mg  DAILY


 PO  3/5/25 10:00


    3/8/25 10:19


81 MG


 


 Hydroxychloroquine


 Sulfate  200 mg  BID


 PO  3/4/25 22:00


    3/8/25 10:19


200 MG


 


 Levothyroxine


 Sodium  100 mcg  DAILY


 PO  3/5/25 10:00


    3/8/25 10:19


100 MCG


 


 Atorvastatin


 Calcium  10 mg  HS


 PO  3/4/25 22:00


    3/7/25 22:20


10 MG


 


 Docusate Sodium  100 mg  BIDP  PRN


 PO  3/4/25 21:00


     





 


 Sevelamer HCl  2,400 mg  TIDWM


 PO  3/5/25 08:00


    3/8/25 11:48


2,400 MG


 


 Zinc Sulfate  220 mg  DAILY


 PO  3/5/25 10:00


    3/8/25 10:18


220 MG


 


 Budesonide  0.5 mg  BID


 NEB  3/4/25 22:00


    3/8/25 07:09


0.5 MG


 


 Norepinephrine


 Bitartrate  250 ml @ 


 3.75 mls/hr  Q24H


 IV  3/4/25 23:45


    3/8/25 06:30


3.75 MLS/HR


 


 Midodrine  10 mg  TID@0600,1200,1800


 PO  3/5/25 06:00


    3/8/25 11:48


10 MG


 


 Levalbuterol HCl  0.625 mg  Q6HR


 NEB  3/6/25 06:00


    3/8/25 12:46


0.625 MG


 


 Phenylephrine HCl


 80 mg/Sodium


 Chloride  250 ml @ 


 7.5 mls/hr  Q24H


 IV  3/6/25 15:00


    3/8/25 06:53


33.75 MLS/HR


 


 Apixaban  2.5 mg  BID


 PO  3/7/25 22:00


    3/8/25 10:19


2.5 MG


 


 Meropenem  50 ml @ 17


 mls/hr  BID


 IV  3/8/25 10:00


    3/8/25 11:47


17 MLS/HR


 


 Vancomycin HCl  0 ml @ 0


 mls/hr  UD


 IV  3/8/25 09:00


     











objective


Elderly female in bed without distress.  Heart tachycardia with a S1 plus S2.  

Lungs fair air movement without any audible rales.  Abdomen soft obese positive 

bowel sounds.  Extremities trace edema around the ankles.


laboratory and microbiology


                                Laboratory Tests


3/8/25 03:11

















Test


 3/8/25


03:11 Range/Units


 


 


Serum Glucose 173 H   mg/dL








Assessment/Plan


Advised nurse to increase the amiodarone drip to 1 milligram/hour given she was 

still remains tachycardic.  Continue current pressor support and IV fluids for 

Nephrology.  Patient is also evaluated by Dr. Charlton pulmonology in the ICU 

today.  Continue rest of supportive care and treatment.  Follow clinical 

management per clinical course and recommendations from the consultants.  

Discussed with the nurse at bedside regarding care plan.


Problems(with codes):  


(1) Chronic kidney disease


(2) Acute abdominal pain


(3) History of peritoneal dialysis


(4) UTI (urinary tract infection)


(5) Acute renal failure


(6) Hypotension


(7) Acute chest pain





Dietary Evaluation Review


Comments:  


Encourage high protein diet. consider Nepro 240ml 19, 432 kcal. PO  


supplements BID


Expected Outcomes/Goals:  


maintain protein levels WNL


Plan discussed with:  Other











BEATRIS CARY MD       Mar 8, 2025 14:54

## 2025-03-09 NOTE — DVHPN2
Progress Note - Dictate


Date Seen:  Mar 9, 2025


Medical Necessity Reason


Pt with a Central, PICC or Fol:  Yes


Subjective


Patient is seen and evaluated in the ICU discussed with the nurse and patient 

family at bedside.  Patient on multiple pressors and amiodarone drip.  Patient 

is still remains hypotensive despite being on multiple pressors at maximal 

doses.  She denies any chest pain shortness for breath headache dizziness or 

lightheadedness.


vital signs





                                   Vital Sign








  Date Time  Temp Pulse Resp B/P (MAP) Pulse Ox O2 Delivery O2 Flow Rate FiO2


 


3/9/25 15:00  103 20  93   


 


3/9/25 14:00      Nasal Cannula* 2 28


 


3/9/25 08:15 98.3       





 98.3       














                           Total Intake and Output   


 


 3/8/25 3/8/25 3/9/25





 15:00 23:00 07:00


 


Intake Total 882.499 ml 3935.703 ml 1709.14 ml


 


Output Total  2800 ml 0 ml


 


Balance 882.499 ml 1135.703 ml 1709.14 ml








medications





                               Current Medications








 Medications  Dose


 Ordered  Sig/Anthony


 Route  Start Time


 Stop Time Status Last Admin


Dose Admin


 


 Ondansetron HCl  4 mg  Q4HP  PRN


 IV  3/4/25 17:30


    3/9/25 12:49


4 MG


 


 Morphine Sulfate  2 mg  Q4HPRN  PRN


 IV  3/4/25 17:30


    3/7/25 22:21


2 MG


 


 Morphine Sulfate  2 mg  Q30M  PRN


 IV  3/4/25 17:30


     





 


 Acetaminophen/


 Hydrocodone Bitart  1 tab  Q4HP  PRN


 PO  3/4/25 17:30


    3/9/25 14:49


1 TAB


 


 Acetaminophen  650 mg  Q6HP  PRN


 PO  3/4/25 17:30


    3/9/25 10:40


650 MG


 


 Nitroglycerin  0.4 mg  Q5MINP  PRN


 SL  3/4/25 17:30


     





 


 Aspirin  81 mg  DAILY


 PO  3/5/25 10:00


    3/9/25 09:29


81 MG


 


 Hydroxychloroquine


 Sulfate  200 mg  BID


 PO  3/4/25 22:00


    3/9/25 09:29


200 MG


 


 Levothyroxine


 Sodium  100 mcg  DAILY


 PO  3/5/25 10:00


    3/9/25 09:29


100 MCG


 


 Atorvastatin


 Calcium  10 mg  HS


 PO  3/4/25 22:00


    3/8/25 22:20


10 MG


 


 Docusate Sodium  100 mg  BIDP  PRN


 PO  3/4/25 21:00


     





 


 Sevelamer HCl  2,400 mg  TIDWM


 PO  3/5/25 08:00


    3/9/25 11:56


2,400 MG


 


 Zinc Sulfate  220 mg  DAILY


 PO  3/5/25 10:00


    3/9/25 09:29


220 MG


 


 Budesonide  0.5 mg  BID


 NEB  3/4/25 22:00


    3/9/25 06:40


0.5 MG


 


 Midodrine  10 mg  TID@0600,1200,1800


 PO  3/5/25 06:00


    3/9/25 11:56


10 MG


 


 Levalbuterol HCl  0.625 mg  Q6HR


 NEB  3/6/25 06:00


    3/9/25 12:19


0.625 MG


 


 Phenylephrine HCl


 80 mg/Sodium


 Chloride  250 ml @ 


 7.5 mls/hr  Q24H


 IV  3/6/25 15:00


    3/9/25 07:19


33.75 MLS/HR


 


 Apixaban  2.5 mg  BID


 PO  3/7/25 22:00


    3/9/25 09:29


2.5 MG


 


 Vancomycin HCl  0 ml @ 0


 mls/hr  UD


 IV  3/8/25 09:00


     





 


 Sodium Chloride  1,000 ml @ 


 75 mls/hr  [id number]


 IV  3/8/25 15:30


    3/9/25 04:51


75 MLS/HR


 


 Meropenem  50 ml @ 17


 mls/hr  DAILY


 IV  3/10/25 10:00


     





 


 Peritoneal


 Dialysis Solution  2,000 ml @ 


 0 mls/hr  Q6HR


 IP  3/9/25 12:00


     





 


 Norepinephrine


 Bitartrate 32 mg/


 Sodium Chloride  250 ml @ 


 0.938 mls/


 hr  Q24H


 IV  3/9/25 13:45


    3/9/25 14:49


14.063 MLS/HR


 


 Albumin Human  100 ml @ 


 100 mls/hr  Q8H


 IV  3/9/25 16:45


 3/10/25 09:44   





 


 Dobutamine HCl/


 Dextrose  250 ml @ 


 10.44 mls/


 hr  [id number]


 IV  3/9/25 16:45


     











objective


Elderly female in bed without distress.  Heart tachycardia with a S1 plus S2.  

Lungs fair air movement without any audible rales.  Abdomen soft obese positive 

bowel sounds.  Extremities trace edema around the ankles.


laboratory and microbiology


                                Laboratory Tests


3/9/25 03:20

















Test


 3/9/25


03:20 Range/Units


 


 


Serum Glucose 115 H   mg/dL








Assessment/Plan


Heart rate in the 90s.  Cut down the amiodarone 2.5 milligrams/hour.  Continue 

Lyrica new as she is on.  We will add low-dose dobutamine if her blood pressure 

does not go above 95.  Her initial blood cultures are negative.  Patient is 

already on empiric IV antibiotics broad-spectrum.  I will check a cortisol 

levels to rule out any adrenal insufficiency.  We will give her a dose of IV 

hydrocortisone to improve the blood pressure.  Also give her IV albumin three 

doses today.  We will repeat and check lactic acid levels given they were 

elevated.  Otherwise continue rest of supportive care and treatment.  Further 

clinical management per clinical course.  Overall prognosis remains guarded.  

Patient/family member and nurse made aware of her guarded condition and plan of 

care


Problems(with codes):  


(1) Hypotension


(2) Hypothyroidism


(3) Acute renal failure


(4) Chronic kidney disease


(5) History of peritoneal dialysis


(6) Abdominal pain





Dietary Evaluation Review


Comments:  


Encourage high protein diet. consider Nepro 240ml 19, 432 kcal. PO  


supplements BID


Expected Outcomes/Goals:  


maintain protein levels WNL


Plan discussed with:  Other











BEATRIS CARY MD       Mar 9, 2025 16:53

## 2025-03-09 NOTE — DVHPN2
Progress Note


Date Seen:  Mar 9, 2025


Medical Necessity Reason


Pt with a Central, PICC or Fol:  Yes





Subjective


Patient reports:  Feels better


Review of Systems:  Deferred





Objective


vital signs





                                   Vital Sign








  Date Time  Temp Pulse Resp B/P (MAP) Pulse Ox O2 Delivery O2 Flow Rate FiO2


 


3/9/25 15:00  103 20  93   


 


3/9/25 14:00      Nasal Cannula* 2 28


 


3/9/25 08:15 98.3       





 98.3       














                           Total Intake and Output   


 


 3/8/25 3/8/25 3/9/25





 15:00 23:00 07:00


 


Intake Total 882.499 ml 3935.703 ml 1709.14 ml


 


Output Total  2800 ml 0 ml


 


Balance 882.499 ml 1135.703 ml 1709.14 ml








medications





                               Current Medications








 Medications  Dose


 Ordered  Sig/Anthony


 Route  Start Time


 Stop Time Status Last Admin


Dose Admin


 


 Ondansetron HCl  4 mg  Q4HP  PRN


 IV  3/4/25 17:30


    3/9/25 12:49


4 MG


 


 Morphine Sulfate  2 mg  Q4HPRN  PRN


 IV  3/4/25 17:30


    3/7/25 22:21


2 MG


 


 Morphine Sulfate  2 mg  Q30M  PRN


 IV  3/4/25 17:30


     





 


 Acetaminophen/


 Hydrocodone Bitart  1 tab  Q4HP  PRN


 PO  3/4/25 17:30


    3/9/25 14:49


1 TAB


 


 Acetaminophen  650 mg  Q6HP  PRN


 PO  3/4/25 17:30


    3/9/25 10:40


650 MG


 


 Nitroglycerin  0.4 mg  Q5MINP  PRN


 SL  3/4/25 17:30


     





 


 Aspirin  81 mg  DAILY


 PO  3/5/25 10:00


    3/9/25 09:29


81 MG


 


 Hydroxychloroquine


 Sulfate  200 mg  BID


 PO  3/4/25 22:00


    3/9/25 09:29


200 MG


 


 Levothyroxine


 Sodium  100 mcg  DAILY


 PO  3/5/25 10:00


    3/9/25 09:29


100 MCG


 


 Atorvastatin


 Calcium  10 mg  HS


 PO  3/4/25 22:00


    3/8/25 22:20


10 MG


 


 Docusate Sodium  100 mg  BIDP  PRN


 PO  3/4/25 21:00


     





 


 Sevelamer HCl  2,400 mg  TIDWM


 PO  3/5/25 08:00


    3/9/25 11:56


2,400 MG


 


 Zinc Sulfate  220 mg  DAILY


 PO  3/5/25 10:00


    3/9/25 09:29


220 MG


 


 Budesonide  0.5 mg  BID


 NEB  3/4/25 22:00


    3/9/25 06:40


0.5 MG


 


 Midodrine  10 mg  TID@0600,1200,1800


 PO  3/5/25 06:00


    3/9/25 11:56


10 MG


 


 Levalbuterol HCl  0.625 mg  Q6HR


 NEB  3/6/25 06:00


    3/9/25 12:19


0.625 MG


 


 Phenylephrine HCl


 80 mg/Sodium


 Chloride  250 ml @ 


 7.5 mls/hr  Q24H


 IV  3/6/25 15:00


    3/9/25 07:19


33.75 MLS/HR


 


 Apixaban  2.5 mg  BID


 PO  3/7/25 22:00


    3/9/25 09:29


2.5 MG


 


 Vancomycin HCl  0 ml @ 0


 mls/hr  UD


 IV  3/8/25 09:00


     





 


 Sodium Chloride  1,000 ml @ 


 75 mls/hr  [id number]


 IV  3/8/25 15:30


    3/9/25 04:51


75 MLS/HR


 


 Meropenem  50 ml @ 17


 mls/hr  DAILY


 IV  3/10/25 10:00


     





 


 Peritoneal


 Dialysis Solution  2,000 ml @ 


 0 mls/hr  Q6HR


 IP  3/9/25 12:00


     





 


 Norepinephrine


 Bitartrate 32 mg/


 Sodium Chloride  250 ml @ 


 0.938 mls/


 hr  Q24H


 IV  3/9/25 13:45


    3/9/25 14:49


14.063 MLS/HR








Examination:  GENERAL:Normal, HEENT:Normal, NECK:Normal, LUNGS:Normal, 

CVS:Abnormal (afib), ABDOMEN:Normal, MSK:Abnormal, SKIN:Abnormal, NEURO:Normal, 

:Normal


laboratory and microbiology


                                Laboratory Tests


3/9/25 03:20

















Test


 3/9/25


03:20 Range/Units


 


 


Serum Glucose 115 H   mg/dL








                                  Microbiology








 Date/Time


Source Procedure


Growth Status





 


 3/6/25 05:30


Peritoneal Fluid Gram Stain - Final


 Resulted


 


 3/6/25 05:30


Peritoneal Fluid Body Fluid Culture - Preliminary


 Resulted


 


 3/5/25 23:00


Nose MRSA Screen - Final


 Complete


 


 3/4/25 13:09


Blood Blood Culture - Final


NO GROWTH AFTER 5 DAYS OF INCUBATION. Complete











Problem List/Assessment/Plan


Problem List/Assessment/Plan


ESRD on peritoneal dialysis


septic Shock


AFib


Hyperkalemia


Lactic acidosis








recs


continue PD as ordered-


Fluid cell count and differential, Gram stain and cultures for peritoneal fluid 

--no evidence of peritonitis


abx per id 


We will follow closely


k replace prn


on vasopressors


Plan discussed with:  Patient





My Orders


My Orders





                           Orders - AUSTEN DENNISON MD








Procedure Category Date Status





  Time 


 


Peritoneal Dialysis PHA 3/9/25 In Process





2.5% Soln  12:00 


 


Communication Order ORDERS 3/9/25 Transmitted





  10:06 











Dietary Evaluation Review


Comments:  


Encourage high protein diet. consider Nepro 240ml 19, 432 kcal. PO  


supplements BID


Expected Outcomes/Goals:  


maintain protein levels WNL











AUSTEN DENNISON MD              Mar 9, 2025 15:34

## 2025-03-09 NOTE — DVHPN2
Progress Note - Dictate


Date Seen:  Mar 9, 2025


Medical Necessity Reason


Pt with a Central, PICC or Fol:  Yes


Subjective


Patient seen and examined at bedside.


Breathing comfortably on room air.


Overnight events reviewed.


vital signs





                                   Vital Sign








  Date Time  Temp Pulse Resp B/P (MAP) Pulse Ox O2 Delivery O2 Flow Rate FiO2


 


3/9/25 23:18  123 18 112/52    


 


3/9/25 18:46     94   


 


3/9/25 18:37      Nasal Cannula* 4 36


 


3/9/25 16:03 97.8       





 97.8       














                           Total Intake and Output   


 


 3/8/25 3/8/25 3/9/25





 15:00 23:00 07:00


 


Intake Total 882.499 ml 3935.703 ml 1709.14 ml


 


Output Total  2800 ml 0 ml


 


Balance 882.499 ml 1135.703 ml 1709.14 ml








medications





                               Current Medications








 Medications  Dose


 Ordered  Sig/Anthony


 Route  Start Time


 Stop Time Status Last Admin


Dose Admin


 


 Ondansetron HCl  4 mg  Q4HP  PRN


 IV  3/4/25 17:30


    3/9/25 22:50


4 MG


 


 Morphine Sulfate  2 mg  Q4HPRN  PRN


 IV  3/4/25 17:30


    3/9/25 23:18


2 MG


 


 Morphine Sulfate  2 mg  Q30M  PRN


 IV  3/4/25 17:30


     





 


 Acetaminophen/


 Hydrocodone Bitart  1 tab  Q4HP  PRN


 PO  3/4/25 17:30


    3/9/25 14:49


1 TAB


 


 Acetaminophen  650 mg  Q6HP  PRN


 PO  3/4/25 17:30


    3/9/25 10:40


650 MG


 


 Nitroglycerin  0.4 mg  Q5MINP  PRN


 SL  3/4/25 17:30


     





 


 Aspirin  81 mg  DAILY


 PO  3/5/25 10:00


    3/9/25 09:29


81 MG


 


 Hydroxychloroquine


 Sulfate  200 mg  BID


 PO  3/4/25 22:00


    3/9/25 23:25


200 MG


 


 Levothyroxine


 Sodium  100 mcg  DAILY


 PO  3/5/25 10:00


    3/9/25 09:29


100 MCG


 


 Atorvastatin


 Calcium  10 mg  HS


 PO  3/4/25 22:00


    3/9/25 23:25


10 MG


 


 Docusate Sodium  100 mg  BIDP  PRN


 PO  3/4/25 21:00


     





 


 Sevelamer HCl  2,400 mg  TIDWM


 PO  3/5/25 08:00


    3/9/25 17:30


2,400 MG


 


 Zinc Sulfate  220 mg  DAILY


 PO  3/5/25 10:00


    3/9/25 09:29


220 MG


 


 Budesonide  0.5 mg  BID


 NEB  3/4/25 22:00


    3/9/25 18:36


0.5 MG


 


 Midodrine  10 mg  TID@0600,1200,1800


 PO  3/5/25 06:00


    3/9/25 17:30


10 MG


 


 Levalbuterol HCl  0.625 mg  Q6HR


 NEB  3/6/25 06:00


    3/9/25 18:33


0.625 MG


 


 Phenylephrine HCl


 80 mg/Sodium


 Chloride  250 ml @ 


 7.5 mls/hr  Q24H


 IV  3/6/25 15:00


    3/9/25 21:16


33.75 MLS/HR


 


 Apixaban  2.5 mg  BID


 PO  3/7/25 22:00


    3/9/25 22:35


2.5 MG


 


 Vancomycin HCl  0 ml @ 0


 mls/hr  UD


 IV  3/8/25 09:00


     





 


 Meropenem  50 ml @ 17


 mls/hr  DAILY


 IV  3/10/25 10:00


     





 


 Peritoneal


 Dialysis Solution  2,000 ml @ 


 0 mls/hr  Q6HR


 IP  3/9/25 12:00


    3/9/25 18:07


0 MLS/HR


 


 Norepinephrine


 Bitartrate 32 mg/


 Sodium Chloride  250 ml @ 


 0.938 mls/


 hr  Q24H


 IV  3/9/25 13:45


    3/9/25 14:49


14.063 MLS/HR


 


 Albumin Human  100 ml @ 


 100 mls/hr  Q8H


 IV  3/9/25 16:45


 3/10/25 09:44  3/9/25 17:00


100 MLS/HR


 


 Dobutamine HCl/


 Dextrose  250 ml @ 


 10.44 mls/


 hr  [id number]


 IV  3/9/25 16:45


     











objective


Gen.: Patient lying in bed in no apparent distress. Breathing on room air.


Head: Normocephalic, atraumatic.


Eyes: EOMI/PERRLA.


Ears: Normal hearing. Normal anatomy.


Neck/trachea: Trachea midline, supple.


Nose: Normal external anatomy.


Mouth: Moist mucous membranes.


Chest: Decreased air entry bilaterally. No wheezing or rhonchi.


Cardiovascular: Positive S1, positive S2. Regular rate and rhythm.


Abdomen: Positive bowel sounds in all 4 quadrants. Soft, non-tender, non-

distended.


: Deferred.


Rectal: Deferred.


Skin: Warm, dry. Intact.


Extremities: 2+ radial pulses bilaterally. No lower extremity edema.


Neuro: Awake, alert, oriented x3. No gross motor or sensory deficits. Cranial 

nerves II through XII intact. Gait not assessed.


laboratory and microbiology


                                Laboratory Tests


3/9/25 03:20

















Test


 3/9/25


03:20 Range/Units


 


 


Serum Glucose 115 H   mg/dL








Assessment/Plan


Impression:


Acute hypoxic respiratory failure


Shock


Atrial fibrillation w/ RVR


Lactic acidosis


End-stage renal disease, on peritoneal dialysis


Obesity





Events:


Currently on room air


Supplemental oxygen PRN





On pressors for hemodynamic support


Levophed 30 mcg/min and Iam-Synephrine 180 mcg/min


Titrate to keep mean arterial pressure greater than 65 mmHg.





Continue antibiotics


Follow up cultures





Amiodarone drip


Cardiology recs appreciated.





Labs and imaging reviewed.


Rest of plan as noted below.





Plan:


Supplemental oxygen PRN


Titrate to keep O2 sats above 92%.





Incentive spirometry





Continue antibiotics - meropenem


Follow up cultures





Amiodarone drip.


Cardiology recs appreciated.





On pressors for hemodynamic support


Titrate to keep mean arterial pressure greater than 65 mmHg.





IV fluids with NS at 75 ml/hr.





Monitor renal function.


PD per Nephrology


Monitor electrolytes. Supplement as necessary.


Monitor ins and outs.


Nephrology recommendations appreciated





Diet and lifestyle modifications for weight reduction


Obesity - complicates all care





DVT prophylaxis.





Prognosis: Poor given patient's multiple co-morbidities.


Condition: Critical





Rest of plan per hospitalist and other consultants.





A total of 35 minutes of critical care time was spent reviewing the patient 

record, examining the patient, making a diagnostic and therapeutic plan, 

discussing this plan with the medical personnel, following up on diagnostic 

studies and following the patient for clinical stability excluding any and all 

procedures.  At least 50% of this time was spent in direct, face-to-face 

contact.





Thank you, Dr. Platt, for allowing me to participate in this patient's care.


Further recommendations will depend on the patient's clinical course.


Please do not hesitate to contact me if you have any questions or concerns.





This medical document was created using an electronic medical record system with

Dragon computerized dictation system. Although these documentations are being 

carefully reviewed, there may still be some phonetic and typographical changes. 

The errors are purely typographical, due to imperfection on the software 

program, and do not reflect any compromise in the patient's medical care.





Dietary Evaluation Review


Comments:  


Encourage high protein diet. consider Nepro 240ml 19, 432 kcal. PO  


supplements BID


Expected Outcomes/Goals:  


maintain protein levels WNL


Plan discussed with:  Other (BALDO Doran)


Critical Care Time(min):  35











JOSEPH SUAREZ MD              Mar 9, 2025 23:41

## 2025-03-10 NOTE — DVHPN2
Progress Note - Dictate


Date Seen:  Mar 10, 2025


Medical Necessity Reason


Pt with a Central, PICC or Fol:  Yes


Subjective


Patient is seen and evaluated in the ICU discussed with the nurse at bedside.  

Her blood pressure requirements have improved.  Cortisol a.m. p.m. levels 

elevated suggestive of appropriate adrenal response.  Patient apparently had an 

episode of dark brown emesis per nurse this morning.


vital signs





                                   Vital Sign








  Date Time  Temp Pulse Resp B/P (MAP) Pulse Ox O2 Delivery O2 Flow Rate FiO2


 


3/10/25 12:30  104 15 106/48 (67) 98   


 


3/10/25 12:17      Nasal Cannula* 3 32


 


3/10/25 08:00 98.0       





 98.0       














                           Total Intake and Output   


 


 3/9/25 3/9/25 3/10/25





 15:00 23:00 07:00


 


Intake Total 1488.203 ml 3432.464 ml 2702.658 ml


 


Output Total   550 ml


 


Balance 1488.203 ml 3432.464 ml 2152.658 ml








medications





                               Current Medications








 Medications  Dose


 Ordered  Sig/Anthony


 Route  Start Time


 Stop Time Status Last Admin


Dose Admin


 


 Ondansetron HCl  4 mg  Q4HP  PRN


 IV  3/4/25 17:30


    3/10/25 12:15


4 MG


 


 Morphine Sulfate  2 mg  Q4HPRN  PRN


 IV  3/4/25 17:30


    3/9/25 23:18


2 MG


 


 Morphine Sulfate  2 mg  Q30M  PRN


 IV  3/4/25 17:30


     





 


 Acetaminophen/


 Hydrocodone Bitart  1 tab  Q4HP  PRN


 PO  3/4/25 17:30


    3/9/25 14:49


1 TAB


 


 Acetaminophen  650 mg  Q6HP  PRN


 PO  3/4/25 17:30


    3/10/25 09:57


650 MG


 


 Nitroglycerin  0.4 mg  Q5MINP  PRN


 SL  3/4/25 17:30


     





 


 Aspirin  81 mg  DAILY


 PO  3/5/25 10:00


    3/9/25 09:29


81 MG


 


 Hydroxychloroquine


 Sulfate  200 mg  BID


 PO  3/4/25 22:00


    3/9/25 23:25


200 MG


 


 Levothyroxine


 Sodium  100 mcg  DAILY


 PO  3/5/25 10:00


    3/9/25 09:29


100 MCG


 


 Atorvastatin


 Calcium  10 mg  HS


 PO  3/4/25 22:00


    3/9/25 23:25


10 MG


 


 Docusate Sodium  100 mg  BIDP  PRN


 PO  3/4/25 21:00


     





 


 Sevelamer HCl  2,400 mg  TIDWM


 PO  3/5/25 08:00


    3/9/25 17:30


2,400 MG


 


 Zinc Sulfate  220 mg  DAILY


 PO  3/5/25 10:00


    3/9/25 09:29


220 MG


 


 Budesonide  0.5 mg  BID


 NEB  3/4/25 22:00


    3/10/25 06:46


0.5 MG


 


 Midodrine  10 mg  TID@0600,1200,1800


 PO  3/5/25 06:00


    3/10/25 12:24


10 MG


 


 Levalbuterol HCl  0.625 mg  Q6HR


 NEB  3/6/25 06:00


    3/10/25 12:17


0.625 MG


 


 Phenylephrine HCl


 80 mg/Sodium


 Chloride  250 ml @ 


 7.5 mls/hr  Q24H


 IV  3/6/25 15:00


    3/10/25 05:13


33.75 MLS/HR


 


 Apixaban  2.5 mg  BID


 PO  3/7/25 22:00


    3/10/25 09:40


2.5 MG


 


 Vancomycin HCl  0 ml @ 0


 mls/hr  UD


 IV  3/8/25 09:00


     





 


 Meropenem  50 ml @ 17


 mls/hr  DAILY


 IV  3/10/25 10:00


    3/10/25 09:44


17 MLS/HR


 


 Norepinephrine


 Bitartrate 32 mg/


 Sodium Chloride  250 ml @ 


 0.938 mls/


 hr  Q24H


 IV  3/9/25 13:45


    3/10/25 08:21


9.375 MLS/HR


 


 Dobutamine HCl/


 Dextrose  250 ml @ 


 10.44 mls/


 hr  [id number]


 IV  3/9/25 16:45


     





 


 Pantoprazole


 Sodium  40 mg  BID


 IV  3/10/25 06:00


    3/10/25 09:40


40 MG


 


 Magnesium Sulfate/


 Dextrose  100 ml @ 


 100 mls/hr  Q1HR


 IV  3/10/25 11:00


 3/10/25 12:59   





 


 Epoetin Kiran-epbx  10,000 unit  MWF@2100


 SC  3/12/25 21:00


     





 


 Peritoneal


 Dialysis Solution  2,000 ml  Q4H


 IP  3/10/25 16:00


     





 


 Docusate Sodium  100 mg  DAILY


 PO  3/11/25 10:00


   UNV  











objective


Elderly female in bed without distress.  Heart tachycardia with a S1 plus S2.  

Lungs fair air movement without any audible rales.  Abdomen soft obese positive 

bowel sounds.  Extremities trace edema around the ankles.


laboratory and microbiology


                                Laboratory Tests


3/10/25 03:00

















Test


 3/10/25


03:00 Range/Units


 


 


Serum Glucose 160 H   mg/dL








Assessment/Plan


Patient did not receive hydrocortisone and given a normal cortisol levels we 

will DC hydrocortisone.  Lactic acid is improved.  Blood cultures x2 are 

negative for growth.  Peritoneal fluid culture is also negative for growth.  

Continue to titrate off the pressors as she tolerates to keep systolic blood 

pressure above 95.  Otherwise continue current antibiotics.  Protonix IV twice a

day.  GI evaluation for brown emesis.  Otherwise continue rest of supportive 

care and treatment as she is on.  Further clinical management per clinical 

course.  Discussed with the nurse and patient.





Dietary Evaluation Review


Comments:  


Encourage high protein diet. consider Nepro 240ml 19, 432 kcal. PO  


supplements BID


Expected Outcomes/Goals:  


maintain protein levels WNL


Plan discussed with:  Other











BEATRIS CARY MD      Mar 10, 2025 12:43

## 2025-03-10 NOTE — DVHPN2
Progress Note - Dictate


Date Seen:  Mar 10, 2025


Medical Necessity Reason


Pt with a Central, PICC or Fol:  Yes


Subjective


Patient is currently on 2 pressors. 


Patient apparently had an episode of dark brown emesis per nurse this morning.





Lactic acid trending up


Ordered stat IV Vancomycin pharmacy and meropenem renally dosed.


repeat two sets of blood cultures


vital signs





                                   Vital Sign








  Date Time  Temp Pulse Resp B/P (MAP) Pulse Ox O2 Delivery O2 Flow Rate FiO2


 


3/10/25 06:46     93 Nasal Cannula* 3 32


 


3/10/25 06:46  120 14     


 


3/10/25 06:45    98/39 (58)    


 


3/10/25 04:00 97.9       





 97.9       














                           Total Intake and Output   


 


 3/9/25 3/9/25 3/10/25





 15:00 23:00 07:00


 


Intake Total 1488.203 ml 3432.464 ml 2642.873 ml


 


Output Total   550 ml


 


Balance 1488.203 ml 3432.464 ml 2092.873 ml








medications





                               Current Medications








 Medications  Dose


 Ordered  Sig/Anthony


 Route  Start Time


 Stop Time Status Last Admin


Dose Admin


 


 Ondansetron HCl  4 mg  Q4HP  PRN


 IV  3/4/25 17:30


    3/10/25 08:21


4 MG


 


 Morphine Sulfate  2 mg  Q4HPRN  PRN


 IV  3/4/25 17:30


    3/9/25 23:18


2 MG


 


 Morphine Sulfate  2 mg  Q30M  PRN


 IV  3/4/25 17:30


     





 


 Acetaminophen/


 Hydrocodone Bitart  1 tab  Q4HP  PRN


 PO  3/4/25 17:30


    3/9/25 14:49


1 TAB


 


 Acetaminophen  650 mg  Q6HP  PRN


 PO  3/4/25 17:30


    3/10/25 09:57


650 MG


 


 Nitroglycerin  0.4 mg  Q5MINP  PRN


 SL  3/4/25 17:30


     





 


 Aspirin  81 mg  DAILY


 PO  3/5/25 10:00


    3/9/25 09:29


81 MG


 


 Hydroxychloroquine


 Sulfate  200 mg  BID


 PO  3/4/25 22:00


    3/9/25 23:25


200 MG


 


 Levothyroxine


 Sodium  100 mcg  DAILY


 PO  3/5/25 10:00


    3/9/25 09:29


100 MCG


 


 Atorvastatin


 Calcium  10 mg  HS


 PO  3/4/25 22:00


    3/9/25 23:25


10 MG


 


 Docusate Sodium  100 mg  BIDP  PRN


 PO  3/4/25 21:00


     





 


 Sevelamer HCl  2,400 mg  TIDWM


 PO  3/5/25 08:00


    3/9/25 17:30


2,400 MG


 


 Zinc Sulfate  220 mg  DAILY


 PO  3/5/25 10:00


    3/9/25 09:29


220 MG


 


 Budesonide  0.5 mg  BID


 NEB  3/4/25 22:00


    3/10/25 06:46


0.5 MG


 


 Midodrine  10 mg  TID@0600,1200,1800


 PO  3/5/25 06:00


    3/9/25 17:30


10 MG


 


 Levalbuterol HCl  0.625 mg  Q6HR


 NEB  3/6/25 06:00


    3/10/25 06:46


0.625 MG


 


 Phenylephrine HCl


 80 mg/Sodium


 Chloride  250 ml @ 


 7.5 mls/hr  Q24H


 IV  3/6/25 15:00


    3/10/25 05:13


33.75 MLS/HR


 


 Apixaban  2.5 mg  BID


 PO  3/7/25 22:00


    3/10/25 09:40


2.5 MG


 


 Vancomycin HCl  0 ml @ 0


 mls/hr  UD


 IV  3/8/25 09:00


     





 


 Meropenem  50 ml @ 17


 mls/hr  DAILY


 IV  3/10/25 10:00


    3/10/25 09:44


17 MLS/HR


 


 Peritoneal


 Dialysis Solution  2,000 ml @ 


 0 mls/hr  Q6HR


 IP  3/9/25 12:00


    3/10/25 06:16


2,000 MLS/HR


 


 Norepinephrine


 Bitartrate 32 mg/


 Sodium Chloride  250 ml @ 


 0.938 mls/


 hr  Q24H


 IV  3/9/25 13:45


    3/10/25 08:21


9.375 MLS/HR


 


 Dobutamine HCl/


 Dextrose  250 ml @ 


 10.44 mls/


 hr  [id number]


 IV  3/9/25 16:45


     





 


 Pantoprazole


 Sodium  40 mg  BID


 IV  3/10/25 06:00


    3/10/25 09:40


40 MG


 


 Octreotide


 Acetate 500 mcg/


 Sodium Chloride  100 ml @ 


 10 mls/hr  Q10H


 IV  3/10/25 09:30


   UNV  











objective


General Appearance:  Alert, , mild distress


HEENT:  Atraumatic, PERRLA, EOMI


Respiratory:  Clear to auscultation, Normal air movement


Cardiovascular:  Normal S1, Normal S2, Other (afib)


Abdominal:  non tender. 


Extremities:  No clubbing, No cyanosis, Normal pulses ,edema +


Skin:  No rashes, No breakdown


Neuro:  grossly intact


laboratory and microbiology


                                Laboratory Tests


3/10/25 03:00

















Test


 3/10/25


03:00 Range/Units


 


 


Serum Glucose 160 H   mg/dL








Assessment/Plan


Patient is a 82-year-old female presents to the hospital with:





Hypotension worsening


Lactic Acidosis 


Afib uncontrolled


Chest pain


Hyperkalemia


ESRD on PD


Chronic abdominal pain


Diarrhea 





Recommendations:


pressor requirement worsened, on quad strength levo, 


restarted Vancomycin/ Meropenem empirically


recommended A line for accurate BP due  to arm edema


Pulm/ crit care on board


follow blood culture


no clear source of infection


Called and discussed with patient's daughter about her poor prognosis and 

updated for about current status. She wants everything to be done. 


Recommend GI consult





hypotension is likely multifactorial cardiac due to Afib with rvr vs medication 

induced/ acidosis





s/p  Peritoneal fluid analysis: cell count not qualifying for peritonitis. 

cultures are also negative 








Antibiotic status:


Meropenem IV [Restarted on 03/10]


Vancomycin IV [Started on 03/05 - 03/08]





03/07, Vancomycin Random is 20.2. 





03/04, Abdomen Pelvis CT showed No acute intra-abdominal finding. Isodense 

lesion of the right mid kidney measuring 1.2 cm, attention on follow-up is 

recommended.  


Compression fracture of L2 and possibly superior endplate of L1 with mild 

posterior extension resulting in mild spinal canal stenosis, age-indeterminate  





03/04, Blood culture showed no growth





03/06, Fluid culture preliminary showed no growth





03/05, MRSA screening negative





critical care time 45 minutes which includes assessment, coordinating plan with 

nurse and consultants. Plan also discussed with her daughter.





prognosis poor


plan discussed with RN





Thank you for consult.





Dietary Evaluation Review


Comments:  


Encourage high protein diet. consider Nepro 240ml 19, 432 kcal. PO  


supplements BID


Expected Outcomes/Goals:  


maintain protein levels WNL


Plan discussed with:  Daughter











TERRAGELA MD            Mar 10, 2025 10:03

## 2025-03-10 NOTE — DVHINCON2
GI Consult


Consult Note


GI consult note





 Date of Consultation:  03/10/2025





Chief Complaint:  Possible blood in vomit





Referring Physician:  Farhad MELÉNDEZ





H&P:


82-year-old Mohawk-speaking female, ER staff translating, with PMH of ESRD, HLD

and HTN presented to ER with chest pain


Patient complains of nausea and vomiting for one day, mostly having brown-

colored emesis per RN.  Patient has abdominal pain, periumbilical area


Last BM five days ago, usually patient has bowel movements every 2-3 days.  No 

melena or red blood in stool


Patient has had multiple colonoscopy in the past, last colonoscopy more than 

five years ago, unsure of results


Patient has history of GERD.  No history of PUD.  No EGD in past





Past Medical History:


 ESRD, High Lipids, HTN





Past Surgical History:


Cholecystectomy, Hysterectomy, Tonsillectomy





Social History:


NO smoking, drinking ETOH and use of illegal drugs.





Family History:


Unknown per chart





Review of Systems:


Constitutional: no fever, chill, weight loss


HEENT: no eye pain, no hearing loss, no oral lesion, no scleral icterus


Heart: no chest pain, no chest pressure


Lung: no cough, no dyspnea with exertion


Abdomen: see HPI





Physical exam:


General:  NAD, AAOX3


Chest:   lung fields clear to auscultation 


Heart:  RRR, no murmur


Abdomen:  Mild-to-moderate periumbilical tenderness to palpation, +BS





Labs:














Test


 3/10/25


03:00 Range/Units


 


 


Serum Glucose 160 H   mg/dL








                                  Microbiology








 Date/Time


Source Procedure


Growth Status





 


 3/8/25 16:30


Blood Blood Culture - Preliminary


NO GROWTH AFTER 24 HOURS OF INCUBATION. Resulted





 3/6/25 05:30


Peritoneal Fluid Gram Stain - Final


 Resulted


 


 3/6/25 05:30


Peritoneal Fluid Body Fluid Culture - Preliminary


 Resulted


 


 3/5/25 23:00


Nose MRSA Screen - Final


 Complete














Imaging:


CT abdomen pelvis


IMPRESSION: 





1. No acute intra-abdominal finding





2. Isodense lesion of the right mid kidney measuring 1.2 cm, attention on 

follow-up is recommended.





3. Compression fracture of L2 and possibly superior endplate of L1 with mild 

posterior extension resulting in mild spinal canal stenosis, age-indeterminate





Electronically Signed by: Judi Aldana at 03/05/2025 03:48:27 AM





Assessment:


Intractable nausea vomiting


Abdominal pain


GI bleed


Anemia


ESRD on peritoneal dialysis


AFib on blood thinners


Lactic acidosis





Plan:


-discussed with Dr. Leblanc


Stool for occult blood


Monitor H&H, transfuse if hemoglobin less than seven


Colace b.i.d., consider use of lactulose if no bowel movements


Patient not stable for EGD at this time


Continue Protonix b.i.d.


We will continue to monitor the patient


Repeat GI services if any active bleeding


Discussed plan with patient and RN


Thank you for this consult





Date of Service:  Mar 10, 2025


Billing Provider:  BEAU BUSCH


Common Visit Codes:  CONSULT ONLY


Consultation Codes:  [id number] CONSULT <60MIN











BEAU BUSCH                Mar 10, 2025 13:04

## 2025-03-10 NOTE — DVHPN2
Progress Note - Dictate


Date Seen:  Mar 10, 2025


Medical Necessity Reason


Pt with a Central, PICC or Fol:  Yes


Subjective


Patient seen and examined at bedside.


Currently on supplemental oxygen


Overnight events reviewed.


vital signs





                                   Vital Sign








  Date Time  Temp Pulse Resp B/P (MAP) Pulse Ox O2 Delivery O2 Flow Rate FiO2


 


3/10/25 18:36  92 16  100   


 


3/10/25 18:27      Nasal Cannula 3.0 


 


3/10/25 18:27        32


 


3/10/25 18:16    120/45 (70)    


 


3/10/25 17:07 97.5       





 97.5       














                           Total Intake and Output   


 


 3/9/25 3/9/25 3/10/25





 15:00 23:00 07:00


 


Intake Total 1488.203 ml 3432.464 ml 2702.658 ml


 


Output Total   550 ml


 


Balance 1488.203 ml 3432.464 ml 2152.658 ml








medications





                               Current Medications








 Medications  Dose


 Ordered  Sig/Anthony


 Route  Start Time


 Stop Time Status Last Admin


Dose Admin


 


 Ondansetron HCl  4 mg  Q4HP  PRN


 IV  3/4/25 17:30


    3/10/25 16:36


4 MG


 


 Morphine Sulfate  2 mg  Q4HPRN  PRN


 IV  3/4/25 17:30


    3/9/25 23:18


2 MG


 


 Morphine Sulfate  2 mg  Q30M  PRN


 IV  3/4/25 17:30


     





 


 Acetaminophen/


 Hydrocodone Bitart  1 tab  Q4HP  PRN


 PO  3/4/25 17:30


    3/9/25 14:49


1 TAB


 


 Acetaminophen  650 mg  Q6HP  PRN


 PO  3/4/25 17:30


    3/10/25 09:57


650 MG


 


 Nitroglycerin  0.4 mg  Q5MINP  PRN


 SL  3/4/25 17:30


     





 


 Aspirin  81 mg  DAILY


 PO  3/5/25 10:00


    3/9/25 09:29


81 MG


 


 Hydroxychloroquine


 Sulfate  200 mg  BID


 PO  3/4/25 22:00


    3/9/25 23:25


200 MG


 


 Levothyroxine


 Sodium  100 mcg  DAILY


 PO  3/5/25 10:00


    3/9/25 09:29


100 MCG


 


 Atorvastatin


 Calcium  10 mg  HS


 PO  3/4/25 22:00


    3/9/25 23:25


10 MG


 


 Docusate Sodium  100 mg  BIDP  PRN


 PO  3/4/25 21:00


     





 


 Sevelamer HCl  2,400 mg  TIDWM


 PO  3/5/25 08:00


    3/9/25 17:30


2,400 MG


 


 Zinc Sulfate  220 mg  DAILY


 PO  3/5/25 10:00


    3/9/25 09:29


220 MG


 


 Budesonide  0.5 mg  BID


 NEB  3/4/25 22:00


    3/10/25 18:24


0.5 MG


 


 Midodrine  10 mg  TID@0600,1200,1800


 PO  3/5/25 06:00


    3/10/25 18:36


10 MG


 


 Levalbuterol HCl  0.625 mg  Q6HR


 NEB  3/6/25 06:00


    3/10/25 18:24


0.625 MG


 


 Phenylephrine HCl


 80 mg/Sodium


 Chloride  250 ml @ 


 7.5 mls/hr  Q24H


 IV  3/6/25 15:00


    3/10/25 13:05


33.75 MLS/HR


 


 Apixaban  2.5 mg  BID


 PO  3/7/25 22:00


    3/10/25 09:40


2.5 MG


 


 Vancomycin HCl  0 ml @ 0


 mls/hr  UD


 IV  3/8/25 09:00


     





 


 Meropenem  50 ml @ 17


 mls/hr  DAILY


 IV  3/10/25 10:00


    3/10/25 09:44


17 MLS/HR


 


 Norepinephrine


 Bitartrate 32 mg/


 Sodium Chloride  250 ml @ 


 0.938 mls/


 hr  Q24H


 IV  3/9/25 13:45


    3/10/25 08:21


9.375 MLS/HR


 


 Dobutamine HCl/


 Dextrose  250 ml @ 


 10.44 mls/


 hr  [id number]


 IV  3/9/25 16:45


     





 


 Epoetin Kiran-epbx  10,000 unit  MWF@2100


 SC  3/12/25 21:00


     





 


 Docusate Sodium  100 mg  DAILY


 PO  3/11/25 10:00


     





 


 Amino Acids  0 ml @ 0


 mls/hr  PER  PHARMACY


 IV  3/10/25 15:00


     





 


 Pantoprazole


 Sodium  50 ml @ 10


 mls/hr  Q5H


 IV  3/10/25 15:00


    3/10/25 18:35


10 MLS/HR


 


 Diagnostic Test


  (Pha)  1 strip  Q6HR


 VI  3/10/25 18:00


     





 


 Insulin Human


 Regular  FOLLOW


 SLIDING


 SCALE  Q6HR


 SC  3/10/25 18:00


     





 


 Dextrose  50 ml  UD


 IV  3/10/25 15:30


     





 


 Amino Acids  1,000 ml @ 


 41 mls/hr  DAILY@2200


 IV  3/10/25 22:00


     











objective


Gen.: Patient lying in bed in no apparent distress. On supplemental oxygen.


Head: Normocephalic, atraumatic.


Eyes: EOMI/PERRLA.


Ears: Normal hearing. Normal anatomy.


Neck/trachea: Trachea midline, supple.


Nose: Normal external anatomy.


Mouth: Moist mucous membranes.


Chest: Decreased air entry bilaterally. No wheezing or rhonchi.


Cardiovascular: Positive S1, positive S2. Regular rate and rhythm.


Abdomen: Positive bowel sounds in all 4 quadrants. Soft, non-tender, non-

distended.


: Deferred.


Rectal: Deferred.


Skin: Warm, dry. Intact.


Extremities: 2+ radial pulses bilaterally. No lower extremity edema.


Neuro: Awake, alert, oriented x3. No gross motor or sensory deficits. Cranial 

nerves II through XII intact. Gait not assessed.


laboratory and microbiology


                                Laboratory Tests


3/10/25 12:50








3/10/25 03:00

















Test


 3/10/25


03:00 Range/Units


 


 


Serum Glucose 160 H   mg/dL








Assessment/Plan


Impression:


Acute hypoxic respiratory failure


Shock


Atrial fibrillation w/ RVR


Lactic acidosis


End-stage renal disease, on peritoneal dialysis


Obesity





Events:


Currently on supplemental oxygen 3 LPM NC


Taper O2 as tolerated





Nausea - Zofran PRN.





On pressors for hemodynamic support


Levophed 20 mcg/min


Titrate to keep mean arterial pressure greater than 65 mmHg.





Continue antibiotics


Blood cultures show no growth after 48 hours





IV fluids at 100 ml/hr.





Amiodarone drip


Cardiology recs appreciated.





Albumin x3


Follow up GI recs for nausea and decreased hemoglobin





Protonix BID


Sandostatin drip.





Monitor hemoglobin - dropped 2 g


Receiving 1 unit PRBC





Note, PD was discontinued today due to abdominal distention.





Lactic acid trending down.





Monitor renal function.


Monitor electrolytes. Supplement as necessary.


Magnesium supplementation





Labs and imaging reviewed.


Rest of plan as noted below.





Plan:


Supplemental oxygen PRN


Titrate to keep O2 sats above 92%.





Incentive spirometry





Continue antibiotics - meropenem


Follow up cultures





Amiodarone drip.


Cardiology recs appreciated.





On pressors for hemodynamic support


Titrate to keep mean arterial pressure greater than 65 mmHg.





Monitor renal function.


PD per Nephrology


Monitor electrolytes. Supplement as necessary.


Monitor ins and outs.


Nephrology recommendations appreciated





Diet and lifestyle modifications for weight reduction


Obesity - complicates all care





DVT prophylaxis.





Prognosis: Poor given patient's multiple co-morbidities.


Condition: Critical





Rest of plan per hospitalist and other consultants.





A total of 35 minutes of critical care time was spent reviewing the patient 

record, examining the patient, making a diagnostic and therapeutic plan, 

discussing this plan with the medical personnel, following up on diagnostic 

studies and following the patient for clinical stability excluding any and all 

procedures.  At least 50% of this time was spent in direct, face-to-face 

contact.





Thank you, Dr. Platt, for allowing me to participate in this patient's care.


Further recommendations will depend on the patient's clinical course.


Please do not hesitate to contact me if you have any questions or concerns.





This medical document was created using an electronic medical record system with

Dragon computerized dictation system. Although these documentations are being 

carefully reviewed, there may still be some phonetic and typographical changes. 

The errors are purely typographical, due to imperfection on the software 

program, and do not reflect any compromise in the patient's medical care.





Dietary Evaluation Review


Comments:  


Encourage high protein diet. consider Nepro 240ml 19, 432 kcal. PO  


supplements BID


Expected Outcomes/Goals:  


maintain protein levels WNL


Plan discussed with:  Other (BALDO Negron)


Critical Care Time(min):  35











JOSEPH SUAREZ MD             Mar 10, 2025 19:21

## 2025-03-10 NOTE — DVH
CHEST RADIOGRAPH



Indication: sepsis



Technique: Single frontal view of the chest was obtained



Comparison: XY CHEST XRAY 1 VIEW on DOS: 3/6/25



FINDINGS: 



Lines and Tubes: Right central venous catheter terminates in the right atrium.



Lungs: No focal consolidation.



Pleura: No effusion.



No pneumothorax. 



Cardiomediastinal contours: Unremarkable



Bones: No acute osseous abnormality.



IMPRESSION:



1. Right central venous catheter terminates in the right atrium.



2. No acute cardiopulmonary disease.



Electronically Signed by: Katie Stover at 03/10/2025 04:35:26 AM

## 2025-03-10 NOTE — DVHPN2
Progress Note


Date Seen:  Mar 10, 2025


Medical Necessity Reason


Pt with a Central, PICC or Fol:  Yes





Subjective


Patient reports:  No new complaints


Review of Systems:  RESPIRATORY:Abnormal, GI:Abnormal


Other Systems:  


Patient seen and examined by myself today in follow-up 


Patient examined in protein dialysis PD fluid is clear





Objective


vital signs





                                   Vital Sign








  Date Time  Temp Pulse Resp B/P (MAP) Pulse Ox O2 Delivery O2 Flow Rate FiO2


 


3/10/25 11:15  107 14 110/52 (71) 100   


 


3/10/25 10:00      Nasal Cannula* 3 32


 


3/10/25 08:00 98.0       





 98.0       














                           Total Intake and Output   


 


 3/9/25 3/9/25 3/10/25





 15:00 23:00 07:00


 


Intake Total 1488.203 ml 3432.464 ml 2702.658 ml


 


Output Total   550 ml


 


Balance 1488.203 ml 3432.464 ml 2152.658 ml








medications





                               Current Medications








 Medications  Dose


 Ordered  Sig/Anthony


 Route  Start Time


 Stop Time Status Last Admin


Dose Admin


 


 Ondansetron HCl  4 mg  Q4HP  PRN


 IV  3/4/25 17:30


    3/10/25 08:21


4 MG


 


 Morphine Sulfate  2 mg  Q4HPRN  PRN


 IV  3/4/25 17:30


    3/9/25 23:18


2 MG


 


 Morphine Sulfate  2 mg  Q30M  PRN


 IV  3/4/25 17:30


     





 


 Acetaminophen/


 Hydrocodone Bitart  1 tab  Q4HP  PRN


 PO  3/4/25 17:30


    3/9/25 14:49


1 TAB


 


 Acetaminophen  650 mg  Q6HP  PRN


 PO  3/4/25 17:30


    3/10/25 09:57


650 MG


 


 Nitroglycerin  0.4 mg  Q5MINP  PRN


 SL  3/4/25 17:30


     





 


 Aspirin  81 mg  DAILY


 PO  3/5/25 10:00


    3/9/25 09:29


81 MG


 


 Hydroxychloroquine


 Sulfate  200 mg  BID


 PO  3/4/25 22:00


    3/9/25 23:25


200 MG


 


 Levothyroxine


 Sodium  100 mcg  DAILY


 PO  3/5/25 10:00


    3/9/25 09:29


100 MCG


 


 Atorvastatin


 Calcium  10 mg  HS


 PO  3/4/25 22:00


    3/9/25 23:25


10 MG


 


 Docusate Sodium  100 mg  BIDP  PRN


 PO  3/4/25 21:00


     





 


 Sevelamer HCl  2,400 mg  TIDWM


 PO  3/5/25 08:00


    3/9/25 17:30


2,400 MG


 


 Zinc Sulfate  220 mg  DAILY


 PO  3/5/25 10:00


    3/9/25 09:29


220 MG


 


 Budesonide  0.5 mg  BID


 NEB  3/4/25 22:00


    3/10/25 06:46


0.5 MG


 


 Midodrine  10 mg  TID@0600,1200,1800


 PO  3/5/25 06:00


    3/9/25 17:30


10 MG


 


 Levalbuterol HCl  0.625 mg  Q6HR


 NEB  3/6/25 06:00


    3/10/25 06:46


0.625 MG


 


 Phenylephrine HCl


 80 mg/Sodium


 Chloride  250 ml @ 


 7.5 mls/hr  Q24H


 IV  3/6/25 15:00


    3/10/25 05:13


33.75 MLS/HR


 


 Apixaban  2.5 mg  BID


 PO  3/7/25 22:00


    3/10/25 09:40


2.5 MG


 


 Vancomycin HCl  0 ml @ 0


 mls/hr  UD


 IV  3/8/25 09:00


     





 


 Meropenem  50 ml @ 17


 mls/hr  DAILY


 IV  3/10/25 10:00


    3/10/25 09:44


17 MLS/HR


 


 Peritoneal


 Dialysis Solution  2,000 ml @ 


 0 mls/hr  Q6HR


 IP  3/9/25 12:00


    3/10/25 06:16


2,000 MLS/HR


 


 Norepinephrine


 Bitartrate 32 mg/


 Sodium Chloride  250 ml @ 


 0.938 mls/


 hr  Q24H


 IV  3/9/25 13:45


    3/10/25 08:21


9.375 MLS/HR


 


 Dobutamine HCl/


 Dextrose  250 ml @ 


 10.44 mls/


 hr  [id number]


 IV  3/9/25 16:45


     





 


 Pantoprazole


 Sodium  40 mg  BID


 IV  3/10/25 06:00


    3/10/25 09:40


40 MG


 


 Octreotide


 Acetate 500 mcg/


 Sodium Chloride  100 ml @ 


 10 mls/hr  Q10H


 IV  3/10/25 09:30


     





 


 Magnesium Sulfate/


 Dextrose  100 ml @ 


 100 mls/hr  Q1HR


 IV  3/10/25 11:00


 3/10/25 12:59   





 


 Epoetin Kiran-epbx  10,000 unit  MWF@2100


 SC  3/12/25 21:00


     











Examination:  LUNGS:Normal, CVS:Normal, MSK:Normal


laboratory and microbiology


                                Laboratory Tests


3/10/25 03:00

















Test


 3/10/25


03:00 Range/Units


 


 


Serum Glucose 160 H   mg/dL








                                  Microbiology








 Date/Time


Source Procedure


Growth Status





 


 3/8/25 16:30


Blood Blood Culture - Preliminary


NO GROWTH AFTER 24 HOURS OF INCUBATION. Resulted





 3/6/25 05:30


Peritoneal Fluid Gram Stain - Final


 Resulted


 


 3/6/25 05:30


Peritoneal Fluid Body Fluid Culture - Preliminary


 Resulted


 


 3/5/25 23:00


Nose MRSA Screen - Final


 Complete











Problem List/Assessment/Plan


Problem List/Assessment/Plan


ESRD on peritoneal dialysis


septic Shock


AFib


Hyperkalemia


Lactic acidosis0


Intractable nausea vomiting


Dropping hemoglobin


GI bleeding





Recommendations


Change protein dialysis to five exchanges per day


Use 2.5% Dianeal solution 2 L


Strict I&Os


Check PD white blood cell count


Packed red blood transfusion p.r.n.


IV antibiotic


IV pressors for blood pressure support


Midodrine 10 mg p.o. t.i.d.


GI consult


We will continue to follow


Plan discussed with:  Patient





My Orders


My Orders





                        Orders - FAIZA MORALES MD








Procedure Category Date Status





  Time 


 


Magnesium Sulfate PHA 3/10/25 In Process





1gm/100ml  11:00 


 


Hemoglobin LAB 3/11/25 Verified





  05:00 


 


Hematocrit LAB 3/11/25 Verified





  05:00 


 


Iron Panel LAB 3/11/25 Verified





  05:00 


 


Transferrin LAB 3/11/25 Verified





  05:00 


 


Ferritin LAB 3/11/25 Verified





  05:00 


 


Epoetin Kiran-Epbx PHA 3/12/25 In Process





 (Retacrit)  21:00 











Dietary Evaluation Review


Comments:  


Encourage high protein diet. consider Nepro 240ml 19, 432 kcal. PO  


supplements BID


Expected Outcomes/Goals:  


maintain protein levels WNL











FAIZA MORALES MD        Mar 10, 2025 12:04

## 2025-03-11 NOTE — DVHPN2
Progress Note - Dictate


Date Seen:  Mar 11, 2025


Medical Necessity Reason


Pt with a Central, PICC or Fol:  Yes


Subjective


Patient is seen and evaluated in the ICU discussed with the nurse at bedside.  

Levophed is being cut down given blood pressure is stabilized.  Still on Iam-

Synephrine.  No further vomiting or emesis noted.


vital signs





                                   Vital Sign








  Date Time  Temp Pulse Resp B/P (MAP) Pulse Ox O2 Delivery O2 Flow Rate FiO2


 


3/11/25 17:00  105 16 101/49 (66) 99   


 


3/11/25 15:50 98.1       





 98.1       


 


3/11/25 14:41       0.0 21


 


3/11/25 14:00      Room Air*  














                           Total Intake and Output   


 


 3/10/25 3/10/25 3/11/25





 15:00 23:00 07:00


 


Intake Total 553.907 ml 4996.157 ml 1208.784 ml


 


Output Total 200 ml 750 ml 


 


Balance 353.907 ml 4246.157 ml 1208.784 ml








medications





                               Current Medications








 Medications  Dose


 Ordered  Sig/Anthony


 Route  Start Time


 Stop Time Status Last Admin


Dose Admin


 


 Ondansetron HCl  4 mg  Q4HP  PRN


 IV  3/4/25 17:30


    3/11/25 05:46


4 MG


 


 Morphine Sulfate  2 mg  Q4HPRN  PRN


 IV  3/4/25 17:30


    3/11/25 05:47


2 MG


 


 Morphine Sulfate  2 mg  Q30M  PRN


 IV  3/4/25 17:30


     





 


 Acetaminophen/


 Hydrocodone Bitart  1 tab  Q4HP  PRN


 PO  3/4/25 17:30


    3/9/25 14:49


1 TAB


 


 Acetaminophen  650 mg  Q6HP  PRN


 PO  3/4/25 17:30


    3/10/25 09:57


650 MG


 


 Nitroglycerin  0.4 mg  Q5MINP  PRN


 SL  3/4/25 17:30


     





 


 Hydroxychloroquine


 Sulfate  200 mg  BID


 PO  3/4/25 22:00


    3/9/25 23:25


200 MG


 


 Levothyroxine


 Sodium  100 mcg  DAILY


 PO  3/5/25 10:00


    3/11/25 09:49


100 MCG


 


 Atorvastatin


 Calcium  10 mg  HS


 PO  3/4/25 22:00


    3/9/25 23:25


10 MG


 


 Docusate Sodium  100 mg  BIDP  PRN


 PO  3/4/25 21:00


     





 


 Sevelamer HCl  2,400 mg  TIDWM


 PO  3/5/25 08:00


    3/9/25 17:30


2,400 MG


 


 Budesonide  0.5 mg  BID


 NEB  3/4/25 22:00


    3/11/25 09:28


0.5 MG


 


 Midodrine  10 mg  TID@0600,1200,1800


 PO  3/5/25 06:00


    3/11/25 17:12


10 MG


 


 Levalbuterol HCl  0.625 mg  Q6HR


 NEB  3/6/25 06:00


    3/11/25 11:43


0.625 MG


 


 Phenylephrine HCl


 80 mg/Sodium


 Chloride  250 ml @ 


 7.5 mls/hr  Q24H


 IV  3/6/25 15:00


    3/11/25 12:02


33.75 MLS/HR


 


 Vancomycin HCl  0 ml @ 0


 mls/hr  UD


 IV  3/8/25 09:00


     





 


 Meropenem  50 ml @ 17


 mls/hr  DAILY


 IV  3/10/25 10:00


    3/11/25 09:39


17 MLS/HR


 


 Norepinephrine


 Bitartrate 32 mg/


 Sodium Chloride  250 ml @ 


 0.938 mls/


 hr  Q24H


 IV  3/9/25 13:45


    3/11/25 13:41


2.813 MLS/HR


 


 Epoetin Kiran-epbx  10,000 unit  MWF@2100


 SC  3/12/25 21:00


     





 


 Docusate Sodium  100 mg  DAILY


 PO  3/11/25 10:00


    3/11/25 09:50


100 MG


 


 Amino Acids  0 ml @ 0


 mls/hr  PER  PHARMACY


 IV  3/10/25 15:00


     





 


 Diagnostic Test


  (Pha)  1 strip  Q6HR


 VI  3/10/25 18:00


    3/11/25 17:14


1 STRIP


 


 Insulin Human


 Regular  FOLLOW


 SLIDING


 SCALE  Q6HR


 SC  3/10/25 18:00


     





 


 Dextrose  50 ml  UD


 IV  3/10/25 15:30


     





 


 Amino Acids  1,000 ml @ 


 41 mls/hr  DAILY@2200


 IV  3/10/25 22:00


    3/10/25 22:00


41 MLS/HR


 


 Metoclopramide HCl  5 mg  Q8HPRN  PRN


 IV  3/10/25 23:15


    3/11/25 09:39


5 MG


 


 Polyethylene


 Glycol  17 gm  BID


 PO  3/11/25 14:45


    3/11/25 17:12


17 GM


 


 Lactulose  30 ml  Q4HR  PRN


 PO  3/11/25 14:45


    3/11/25 17:12


30 ML


 


 Bisacodyl  10 mg  DAILYP  PRN


 CA  3/11/25 14:45


     





 


 Pantoprazole


 Sodium  40 mg  BID


 IV  3/11/25 22:00


     











objective


Elderly female in bed without distress.  Heart tachycardia with a S1 plus S2.  

Lungs fair air movement without any audible rales.  Abdomen soft obese positive 

bowel sounds.  Extremities trace edema around the ankles.


laboratory and microbiology


                                Laboratory Tests


3/11/25 03:00

















Test


 3/11/25


03:00 Range/Units


 


 


Serum Glucose 94    mg/dL








Assessment/Plan


We will transitioned IV Protonix drip to IV b.i.d. dose given no further 

episodes of emesis.  Continue to taper off Levophed and discontinue-blood 

pressure tolerates.  Otherwise continue current antibiotics rest of supportive 

care and treatment as she is on.  Further clinical management per clinical 

course.  Discussed with the nurse regarding care plan in the ICU.


Problems(with codes):  


(1) Chronic kidney disease


(2) History of peritoneal dialysis


(3) Abdominal pain


(4) Hypotension


(5) ESRD (end stage renal disease) on dialysis





Dietary Evaluation Review


Comments:  


Encourage high protein diet. consider Nepro 240ml 19, 432 kcal. PO  


supplements BID


Expected Outcomes/Goals:  


maintain protein levels WNL


Plan discussed with:  Other











BEATRIS CARY MD      Mar 11, 2025 17:38

## 2025-03-11 NOTE — DVHPN2
Progress Note - Dictate


Date Seen:  Mar 11, 2025


Medical Necessity Reason


Pt with a Central, PICC or Fol:  Yes


Subjective


Patient is currently on 2 pressors. Levophed is decreasing slightly. 


No further vomiting or emesis noted.











vital signs





                                   Vital Sign








  Date Time  Temp Pulse Resp B/P (MAP) Pulse Ox O2 Delivery O2 Flow Rate FiO2


 


3/11/25 18:46  100 15 111/54 (73) 98   


 


3/11/25 18:40      Room Air* 0 21


 


3/11/25 15:50 98.1       





 98.1       














                           Total Intake and Output   


 


 3/10/25 3/10/25 3/11/25





 15:00 23:00 07:00


 


Intake Total 553.907 ml 4996.157 ml 1208.784 ml


 


Output Total 200 ml 750 ml 


 


Balance 353.907 ml 4246.157 ml 1208.784 ml








medications





                               Current Medications








 Medications  Dose


 Ordered  Sig/Anthony


 Route  Start Time


 Stop Time Status Last Admin


Dose Admin


 


 Ondansetron HCl  4 mg  Q4HP  PRN


 IV  3/4/25 17:30


    3/11/25 05:46


4 MG


 


 Morphine Sulfate  2 mg  Q4HPRN  PRN


 IV  3/4/25 17:30


    3/11/25 05:47


2 MG


 


 Morphine Sulfate  2 mg  Q30M  PRN


 IV  3/4/25 17:30


     





 


 Acetaminophen/


 Hydrocodone Bitart  1 tab  Q4HP  PRN


 PO  3/4/25 17:30


    3/9/25 14:49


1 TAB


 


 Acetaminophen  650 mg  Q6HP  PRN


 PO  3/4/25 17:30


    3/10/25 09:57


650 MG


 


 Nitroglycerin  0.4 mg  Q5MINP  PRN


 SL  3/4/25 17:30


     





 


 Hydroxychloroquine


 Sulfate  200 mg  BID


 PO  3/4/25 22:00


    3/9/25 23:25


200 MG


 


 Levothyroxine


 Sodium  100 mcg  DAILY


 PO  3/5/25 10:00


    3/11/25 09:49


100 MCG


 


 Atorvastatin


 Calcium  10 mg  HS


 PO  3/4/25 22:00


    3/9/25 23:25


10 MG


 


 Docusate Sodium  100 mg  BIDP  PRN


 PO  3/4/25 21:00


     





 


 Sevelamer HCl  2,400 mg  TIDWM


 PO  3/5/25 08:00


    3/9/25 17:30


2,400 MG


 


 Budesonide  0.5 mg  BID


 NEB  3/4/25 22:00


    3/11/25 18:41


0.5 MG


 


 Midodrine  10 mg  TID@0600,1200,1800


 PO  3/5/25 06:00


    3/11/25 17:12


10 MG


 


 Levalbuterol HCl  0.625 mg  Q6HR


 NEB  3/6/25 06:00


    3/11/25 18:41


0.625 MG


 


 Phenylephrine HCl


 80 mg/Sodium


 Chloride  250 ml @ 


 7.5 mls/hr  Q24H


 IV  3/6/25 15:00


    3/11/25 19:43


33.75 MLS/HR


 


 Vancomycin HCl  0 ml @ 0


 mls/hr  UD


 IV  3/8/25 09:00


     





 


 Meropenem  50 ml @ 17


 mls/hr  DAILY


 IV  3/10/25 10:00


    3/11/25 09:39


17 MLS/HR


 


 Norepinephrine


 Bitartrate 32 mg/


 Sodium Chloride  250 ml @ 


 0.938 mls/


 hr  Q24H


 IV  3/9/25 13:45


    3/11/25 13:41


2.813 MLS/HR


 


 Epoetin Kiran-epbx  10,000 unit  MWF@2100


 SC  3/12/25 21:00


     





 


 Docusate Sodium  100 mg  DAILY


 PO  3/11/25 10:00


    3/11/25 09:50


100 MG


 


 Amino Acids  0 ml @ 0


 mls/hr  PER  PHARMACY


 IV  3/10/25 15:00


     





 


 Diagnostic Test


  (Pha)  1 strip  Q6HR


 VI  3/10/25 18:00


    3/11/25 17:14


1 STRIP


 


 Insulin Human


 Regular  FOLLOW


 SLIDING


 SCALE  Q6HR


 SC  3/10/25 18:00


     





 


 Dextrose  50 ml  UD


 IV  3/10/25 15:30


     





 


 Amino Acids  1,000 ml @ 


 41 mls/hr  DAILY@2200


 IV  3/10/25 22:00


    3/10/25 22:00


41 MLS/HR


 


 Metoclopramide HCl  5 mg  Q8HPRN  PRN


 IV  3/10/25 23:15


    3/11/25 09:39


5 MG


 


 Polyethylene


 Glycol  17 gm  BID


 PO  3/11/25 14:45


    3/11/25 17:12


17 GM


 


 Lactulose  30 ml  Q4HR  PRN


 PO  3/11/25 14:45


    3/11/25 17:12


30 ML


 


 Bisacodyl  10 mg  DAILYP  PRN


 [place]  3/11/25 14:45


     





 


 Pantoprazole


 Sodium  40 mg  BID


 IV  3/11/25 22:00


     











objective


General Appearance:  Alert, , mild distress


HEENT:  Atraumatic, PERRLA, EOMI


Respiratory:  Clear to auscultation, Normal air movement


Cardiovascular:  Normal S1, Normal S2, Other (afib)


Abdominal:  non tender. 


Extremities:  No clubbing, No cyanosis, Normal pulses ,edema +


Skin:  No rashes, No breakdown


Neuro:  grossly intact


laboratory and microbiology


                                Laboratory Tests


3/11/25 03:00

















Test


 3/11/25


03:00 Range/Units


 


 


Serum Glucose 94    mg/dL








Assessment/Plan


Patient is a 82-year-old female presents to the hospital with:





Hypotension/Shock


Lactic Acidosis 


Afib uncontrolled


Chest pain


Hyperkalemia


ESRD on PD


Chronic abdominal pain


Diarrhea 





Recommendations:


Patient is still on 2 pressors, however slightly decreasing Levophed use.








Continue Vancomycin/ Meropenem empirically


Consider A line for accurate BP due  to arm edema


Pulm/ crit care on board


follow blood culture


no clear source of infection


Recommend GI consult





hypotension is likely multifactorial cardiac due to Afib with rvr vs medication 

induced/ acidosis





s/p  Peritoneal fluid analysis: cell count not qualifying for peritonitis. 

cultures are also negative 








Antibiotic status:


Meropenem IV [Restarted on 03/10]


Vancomycin IV [Started on 03/05 - 03/08]





03/07, Vancomycin Random is 20.2. 





03/04, Abdomen Pelvis CT showed No acute intra-abdominal finding. Isodense 

lesion of the right mid kidney measuring 1.2 cm, attention on follow-up is 

recommended.  


Compression fracture of L2 and possibly superior endplate of L1 with mild 

posterior extension resulting in mild spinal canal stenosis, age-indeterminate  





03/04, Blood culture showed no growth





03/06, Fluid culture preliminary showed no growth





03/05, MRSA screening negative





critical care time 35 minutes which includes assessment, coordinating plan with 

nurse and consultants. 





prognosis poor


plan discussed with RN





Thank you for consult.





Dietary Evaluation Review


Comments:  


Encourage high protein diet. consider Nepro 240ml 19, 432 kcal. PO  


supplements BID


Expected Outcomes/Goals:  


maintain protein levels WNL


Plan discussed with:  Other











GELA ELLISON MD            Mar 11, 2025 21:23

## 2025-03-11 NOTE — DVHPN2
Progress Note - Dictate


Date Seen:  Mar 11, 2025


Medical Necessity Reason


Pt with a Central, PICC or Fol:  Yes


Subjective


Patient seen and examined at bedside.


Currently breathing on room air


Overnight events reviewed.


vital signs





                                   Vital Sign








  Date Time  Temp Pulse Resp B/P (MAP) Pulse Ox O2 Delivery O2 Flow Rate FiO2


 


3/11/25 20:00  97      


 


3/11/25 18:46   15 111/54 (73) 98   


 


3/11/25 18:40      Room Air* 0 21


 


3/11/25 15:50 98.1       





 98.1       














                           Total Intake and Output   


 


 3/10/25 3/10/25 3/11/25





 15:00 23:00 07:00


 


Intake Total 553.907 ml 4996.157 ml 1208.784 ml


 


Output Total 200 ml 750 ml 


 


Balance 353.907 ml 4246.157 ml 1208.784 ml








medications





                               Current Medications








 Medications  Dose


 Ordered  Sig/Anthony


 Route  Start Time


 Stop Time Status Last Admin


Dose Admin


 


 Ondansetron HCl  4 mg  Q4HP  PRN


 IV  3/4/25 17:30


    3/11/25 05:46


4 MG


 


 Morphine Sulfate  2 mg  Q4HPRN  PRN


 IV  3/4/25 17:30


    3/11/25 05:47


2 MG


 


 Morphine Sulfate  2 mg  Q30M  PRN


 IV  3/4/25 17:30


     





 


 Acetaminophen/


 Hydrocodone Bitart  1 tab  Q4HP  PRN


 PO  3/4/25 17:30


    3/9/25 14:49


1 TAB


 


 Acetaminophen  650 mg  Q6HP  PRN


 PO  3/4/25 17:30


    3/10/25 09:57


650 MG


 


 Nitroglycerin  0.4 mg  Q5MINP  PRN


 SL  3/4/25 17:30


     





 


 Hydroxychloroquine


 Sulfate  200 mg  BID


 PO  3/4/25 22:00


    3/11/25 22:10


200 MG


 


 Levothyroxine


 Sodium  100 mcg  DAILY


 PO  3/5/25 10:00


    3/11/25 09:49


100 MCG


 


 Atorvastatin


 Calcium  10 mg  HS


 PO  3/4/25 22:00


    3/11/25 22:10


10 MG


 


 Docusate Sodium  100 mg  BIDP  PRN


 PO  3/4/25 21:00


     





 


 Sevelamer HCl  2,400 mg  TIDWM


 PO  3/5/25 08:00


    3/9/25 17:30


2,400 MG


 


 Budesonide  0.5 mg  BID


 NEB  3/4/25 22:00


    3/11/25 18:41


0.5 MG


 


 Midodrine  10 mg  TID@0600,1200,1800


 PO  3/5/25 06:00


    3/11/25 17:12


10 MG


 


 Levalbuterol HCl  0.625 mg  Q6HR


 NEB  3/6/25 06:00


    3/11/25 18:41


0.625 MG


 


 Phenylephrine HCl


 80 mg/Sodium


 Chloride  250 ml @ 


 7.5 mls/hr  Q24H


 IV  3/6/25 15:00


    3/11/25 19:43


33.75 MLS/HR


 


 Vancomycin HCl  0 ml @ 0


 mls/hr  UD


 IV  3/8/25 09:00


     





 


 Meropenem  50 ml @ 17


 mls/hr  DAILY


 IV  3/10/25 10:00


    3/11/25 09:39


17 MLS/HR


 


 Norepinephrine


 Bitartrate 32 mg/


 Sodium Chloride  250 ml @ 


 0.938 mls/


 hr  Q24H


 IV  3/9/25 13:45


    3/11/25 13:41


2.813 MLS/HR


 


 Epoetin Kiran-epbx  10,000 unit  MWF@2100


 SC  3/12/25 21:00


     





 


 Docusate Sodium  100 mg  DAILY


 PO  3/11/25 10:00


    3/11/25 09:50


100 MG


 


 Amino Acids  0 ml @ 0


 mls/hr  PER  PHARMACY


 IV  3/10/25 15:00


     





 


 Diagnostic Test


  (Pha)  1 strip  Q6HR


 VI  3/10/25 18:00


    3/11/25 17:14


1 STRIP


 


 Insulin Human


 Regular  FOLLOW


 SLIDING


 SCALE  Q6HR


 SC  3/10/25 18:00


     





 


 Dextrose  50 ml  UD


 IV  3/10/25 15:30


     





 


 Amino Acids  1,000 ml @ 


 41 mls/hr  DAILY@2200


 IV  3/10/25 22:00


    3/11/25 22:10


41 MLS/HR


 


 Metoclopramide HCl  5 mg  Q8HPRN  PRN


 IV  3/10/25 23:15


    3/11/25 09:39


5 MG


 


 Polyethylene


 Glycol  17 gm  BID


 PO  3/11/25 14:45


    3/11/25 22:12


17 GM


 


 Lactulose  30 ml  Q4HR  PRN


 PO  3/11/25 14:45


    3/11/25 17:12


30 ML


 


 Bisacodyl  10 mg  DAILYP  PRN


 [place]  3/11/25 14:45


     





 


 Pantoprazole


 Sodium  40 mg  BID


 IV  3/11/25 22:00


    3/11/25 22:11


40 MG








objective


Gen.: Patient lying in bed in no apparent distress. On room air


Head: Normocephalic, atraumatic.


Eyes: EOMI/PERRLA.


Ears: Normal hearing. Normal anatomy.


Neck/trachea: Trachea midline, supple.


Nose: Normal external anatomy.


Mouth: Moist mucous membranes.


Chest: Decreased air entry bilaterally. No wheezing or rhonchi.


Cardiovascular: Positive S1, positive S2. Regular rate and rhythm.


Abdomen: Positive bowel sounds in all 4 quadrants. Soft, non-tender, non-

distended.


: Deferred.


Rectal: Deferred.


Skin: Warm, dry. Intact.


Extremities: 2+ radial pulses bilaterally. No lower extremity edema.


Neuro: Awake, alert, oriented x3. No gross motor or sensory deficits. Cranial 

nerves II through XII intact. Gait not assessed.


laboratory and microbiology


                                Laboratory Tests


3/11/25 03:00

















Test


 3/11/25


03:00 Range/Units


 


 


Serum Glucose 94    mg/dL








Assessment/Plan


Impression:


Acute hypoxic respiratory failure


Shock


Atrial fibrillation w/ RVR


Lactic acidosis


End-stage renal disease, on peritoneal dialysis


Obesity





Events:


Currently breathing on room air.


Supplemental oxygen PRN


Improved O2 requirements





Nausea - Zofran PRN.





On pressors for hemodynamic support


Levophed 6 mcg/min and Iam-Synephrine 180 mcg/min


Titrate to keep mean arterial pressure greater than 65 mmHg.





Continue antibiotics


Blood cultures show no growth after 72 hours





GI recs appreciated for nausea and decreased hemoglobin


Robinul 0.2 mg IVP x1 given d/t increased oral secretions


Abdominal distension.





Hemoglobin trended down to 8.3 g/dL


Sandostatin discontinued


Continue Protonix BID


Reglan


Clinimix for nutritional support





Monitor hemoglobin


Transfuse if less than 7.0 g/dL.





Note, PD was discontinued yesterday due to abdominal distention.





Lactic acid trending down.





Monitor renal function - poor UOP


Diurese as tolerated w/ Lasix


Monitor electrolytes. Supplement as necessary.


Hyponatremia - sodium of 132


Follow up Nephrology recs





Labs and imaging reviewed.


Rest of plan as noted below.





Plan:


Supplemental oxygen PRN


Titrate to keep O2 sats above 92%.





Incentive spirometry





Continue antibiotics - meropenem


Follow up cultures





Amiodarone drip - currently on hold.


Cardiology recs appreciated.





On pressors for hemodynamic support


Titrate to keep mean arterial pressure greater than 65 mmHg.





Monitor renal function.


PD per Nephrology


Monitor electrolytes. Supplement as necessary.


Monitor ins and outs.


Nephrology recommendations appreciated





Diet and lifestyle modifications for weight reduction


Obesity - complicates all care





DVT prophylaxis.





Prognosis: Poor given patient's multiple co-morbidities.


Condition: Critical





Rest of plan per hospitalist and other consultants.





A total of 35 minutes of critical care time was spent reviewing the patient 

record, examining the patient, making a diagnostic and therapeutic plan, 

discussing this plan with the medical personnel, following up on diagnostic 

studies and following the patient for clinical stability excluding any and all 

procedures.  At least 50% of this time was spent in direct, face-to-face 

contact.





Thank you, Dr. Platt, for allowing me to participate in this patient's care.


Further recommendations will depend on the patient's clinical course.


Please do not hesitate to contact me if you have any questions or concerns.





This medical document was created using an electronic medical record system with

Dragon computerized dictation system. Although these documentations are being 

carefully reviewed, there may still be some phonetic and typographical changes. 

The errors are purely typographical, due to imperfection on the software 

program, and do not reflect any compromise in the patient's medical care.





Dietary Evaluation Review


Comments:  


Encourage high protein diet. consider Nepro 240ml 19, 432 kcal. PO  


supplements BID


Expected Outcomes/Goals:  


maintain protein levels WNL


Plan discussed with:  Other (BALDO Negron)


Critical Care Time(min):  35











JOSEPH SUAREZ MD             Mar 11, 2025 22:59

## 2025-03-11 NOTE — DVHPN2
Progress Note


Date Seen:  Mar 11, 2025


Resident Creating Document:  SIMONA OCASIO RESIDENT


Medical Necessity Reason


Pt with a Central, PICC or Fol:  Yes





Subjective


Review of Systems


82-year-old [place]ish-speaking female, ER staff translating, with PMH of ESRD, HLD

and HTN presented to ER with chest pain. Patient complains of nausea and 

vomiting for one day, mostly having brown-colored emesis per RN.  Patient has 

abdominal pain, periumbilical area. Last BM five days ago, usually patient has 

bowel movements every 2-3 days.  No melena or red blood in stool. Patient has 

had multiple colonoscopy in the past, last colonoscopy more than five years ago,

unsure of results. Patient has history of GERD.  No history of PUD.  No EGD in 

past.


Patient's abdomen is distended, patient is on peritoneal dialysis, patient got 1

unit of PRBC, today hemoglobin 8.3.  Patient is feeling nauseated but did not 

vomit, patient is getting Reglan.





Objective


vital signs





                                   Vital Sign








  Date Time  Temp Pulse Resp B/P (MAP) Pulse Ox O2 Delivery O2 Flow Rate FiO2


 


3/11/25 13:45  105 14 99/44 (62) 97   


 


3/11/25 12:00      Room Air* 0 21


 


3/11/25 11:15 98.0       





 98.0       














                           Total Intake and Output   


 


 3/10/25 3/10/25 3/11/25





 15:00 23:00 07:00


 


Intake Total 553.907 ml 4996.157 ml 1208.784 ml


 


Output Total 200 ml 750 ml 


 


Balance 353.907 ml 4246.157 ml 1208.784 ml








medications





                               Current Medications








 Medications  Dose


 Ordered  Sig/Anthony


 Route  Start Time


 Stop Time Status Last Admin


Dose Admin


 


 Ondansetron HCl  4 mg  Q4HP  PRN


 IV  3/4/25 17:30


    3/11/25 05:46


4 MG


 


 Morphine Sulfate  2 mg  Q4HPRN  PRN


 IV  3/4/25 17:30


    3/11/25 05:47


2 MG


 


 Morphine Sulfate  2 mg  Q30M  PRN


 IV  3/4/25 17:30


     





 


 Acetaminophen/


 Hydrocodone Bitart  1 tab  Q4HP  PRN


 PO  3/4/25 17:30


    3/9/25 14:49


1 TAB


 


 Acetaminophen  650 mg  Q6HP  PRN


 PO  3/4/25 17:30


    3/10/25 09:57


650 MG


 


 Nitroglycerin  0.4 mg  Q5MINP  PRN


 SL  3/4/25 17:30


     





 


 Hydroxychloroquine


 Sulfate  200 mg  BID


 PO  3/4/25 22:00


    3/9/25 23:25


200 MG


 


 Levothyroxine


 Sodium  100 mcg  DAILY


 PO  3/5/25 10:00


    3/11/25 09:49


100 MCG


 


 Atorvastatin


 Calcium  10 mg  HS


 PO  3/4/25 22:00


    3/9/25 23:25


10 MG


 


 Docusate Sodium  100 mg  BIDP  PRN


 PO  3/4/25 21:00


     





 


 Sevelamer HCl  2,400 mg  TIDWM


 PO  3/5/25 08:00


    3/9/25 17:30


2,400 MG


 


 Budesonide  0.5 mg  BID


 NEB  3/4/25 22:00


    3/11/25 09:28


0.5 MG


 


 Midodrine  10 mg  TID@0600,1200,1800


 PO  3/5/25 06:00


    3/11/25 11:47


10 MG


 


 Levalbuterol HCl  0.625 mg  Q6HR


 NEB  3/6/25 06:00


    3/11/25 11:43


0.625 MG


 


 Phenylephrine HCl


 80 mg/Sodium


 Chloride  250 ml @ 


 7.5 mls/hr  Q24H


 IV  3/6/25 15:00


    3/11/25 12:02


33.75 MLS/HR


 


 Vancomycin HCl  0 ml @ 0


 mls/hr  UD


 IV  3/8/25 09:00


     





 


 Meropenem  50 ml @ 17


 mls/hr  DAILY


 IV  3/10/25 10:00


    3/11/25 09:39


17 MLS/HR


 


 Norepinephrine


 Bitartrate 32 mg/


 Sodium Chloride  250 ml @ 


 0.938 mls/


 hr  Q24H


 IV  3/9/25 13:45


    3/11/25 13:41


2.813 MLS/HR


 


 Epoetin Kiran-epbx  10,000 unit  MWF@2100


 SC  3/12/25 21:00


     





 


 Docusate Sodium  100 mg  DAILY


 PO  3/11/25 10:00


    3/11/25 09:50


100 MG


 


 Amino Acids  0 ml @ 0


 mls/hr  PER  PHARMACY


 IV  3/10/25 15:00


     





 


 Pantoprazole


 Sodium  50 ml @ 10


 mls/hr  Q5H


 IV  3/10/25 15:00


    3/11/25 10:39


10 MLS/HR


 


 Diagnostic Test


  (Pha)  1 strip  Q6HR


 VI  3/10/25 18:00


    3/11/25 11:47


1 STRIP


 


 Insulin Human


 Regular  FOLLOW


 SLIDING


 SCALE  Q6HR


 SC  3/10/25 18:00


     





 


 Dextrose  50 ml  UD


 IV  3/10/25 15:30


     





 


 Amino Acids  1,000 ml @ 


 41 mls/hr  DAILY@2200


 IV  3/10/25 22:00


    3/10/25 22:00


41 MLS/HR


 


 Metoclopramide HCl  5 mg  Q8HPRN  PRN


 IV  3/10/25 23:15


    3/11/25 09:39


5 MG


 


 Potassium Chloride  100 ml @ 


 50 mls/hr  Q2H


 IV  3/11/25 10:30


 3/11/25 14:29  3/11/25 13:10


50 MLS/HR








Examination


GENERAL:  In mild acute distress.  


HEENT: EOMI,  Moist mucous membranes.  No scleral icterus.  No cervical 

lymphadenopathy.


LUNGS: Clear to auscultation bilaterally.  No accessory muscle use.


CARDIOVASCULAR: Regular rate and rhythm.  No murmur.  No JVD.


ABDOMEN: Mild-to-moderate periumbilical tenderness to palpation, +BS, ABD is 

distended


EXTREMITIES: No edema.  Nontender.


SKIN: No rashes or lesions.  Warm.


NEUROLOGIC:  Alert and oriented X3


laboratory and microbiology


                                Laboratory Tests


3/11/25 03:00

















Test


 3/11/25


03:00 Range/Units


 


 


Serum Glucose 94    mg/dL








                                  Microbiology








 Date/Time


Source Procedure


Growth Status





 


 3/8/25 16:30


Blood Blood Culture - Preliminary


NO GROWTH AFTER 48 HOURS OF INCUBATION. Resulted





 3/6/25 05:30


Peritoneal Fluid Gram Stain - Final


 Complete


 


 3/6/25 05:30


Peritoneal Fluid Body Fluid Culture - Final


 Complete


 


 3/5/25 23:00


Nose MRSA Screen - Final


 Complete











Problem List/Assessment/Plan


Problem List/Assessment/Plan


# Intractable nausea vomiting


# Abdominal pain


# GI bleed


# Anemia


# ESRD on peritoneal dialysis


# AFib on blood thinners


# Lactic acidosis





- SOBT pending


- patient got one unit PRBC, today hemoglobin is 8.3


- Monitor H&H, transfuse if hemoglobin less than 7.0


- Colace b.i.d., consider use of lactulose if no bowel movements


- plan for EGD when  patient is stable


- Continue Protonix 40 mg  b.i.d.


- start MiraLax





Thank you so much for the opportunity to consult on your patient. GI team will 

follow the patient.  In case of any questions or concerns please feel free to 

reach out.





Case discussed with Dr. JERONIMO Leblanc.  The patient and caregiver team agreed to the

plan.


Plan discussed with:  Patient





Dietary Evaluation Review


Comments:  


Encourage high protein diet. consider Nepro 240ml 19, 432 kcal. PO  


supplements BID


Expected Outcomes/Goals:  


maintain protein levels WNL











SIMONA OCASIO RESIDENT        Mar 11, 2025 14:12

## 2025-03-11 NOTE — DVHPN2
Progress Note


Date Seen:  Mar 11, 2025


Medical Necessity Reason


Pt with a Central, PICC or Fol:  Yes





Subjective


Review of Systems:  RESPIRATORY:Abnormal


Other Systems:  


Patient seen and examined by myself today in follow-up


Patient examined on protein dialysis





Objective


vital signs





                                   Vital Sign








  Date Time  Temp Pulse Resp B/P (MAP) Pulse Ox O2 Delivery O2 Flow Rate FiO2


 


3/11/25 10:09  90 13 87/40 (56) 96   


 


3/11/25 09:28      Nasal Cannula 1.0 


 


3/11/25 09:28        24


 


3/11/25 08:00 98.1       





 98.1       














                           Total Intake and Output   


 


 3/10/25 3/10/25 3/11/25





 15:00 23:00 07:00


 


Intake Total 553.907 ml 4996.157 ml 1208.784 ml


 


Output Total 200 ml 750 ml 


 


Balance 353.907 ml 4246.157 ml 1208.784 ml








medications





                               Current Medications








 Medications  Dose


 Ordered  Sig/Anthony


 Route  Start Time


 Stop Time Status Last Admin


Dose Admin


 


 Ondansetron HCl  4 mg  Q4HP  PRN


 IV  3/4/25 17:30


    3/11/25 05:46


4 MG


 


 Morphine Sulfate  2 mg  Q4HPRN  PRN


 IV  3/4/25 17:30


    3/11/25 05:47


2 MG


 


 Morphine Sulfate  2 mg  Q30M  PRN


 IV  3/4/25 17:30


     





 


 Acetaminophen/


 Hydrocodone Bitart  1 tab  Q4HP  PRN


 PO  3/4/25 17:30


    3/9/25 14:49


1 TAB


 


 Acetaminophen  650 mg  Q6HP  PRN


 PO  3/4/25 17:30


    3/10/25 09:57


650 MG


 


 Nitroglycerin  0.4 mg  Q5MINP  PRN


 SL  3/4/25 17:30


     





 


 Hydroxychloroquine


 Sulfate  200 mg  BID


 PO  3/4/25 22:00


    3/9/25 23:25


200 MG


 


 Levothyroxine


 Sodium  100 mcg  DAILY


 PO  3/5/25 10:00


    3/11/25 09:49


100 MCG


 


 Atorvastatin


 Calcium  10 mg  HS


 PO  3/4/25 22:00


    3/9/25 23:25


10 MG


 


 Docusate Sodium  100 mg  BIDP  PRN


 PO  3/4/25 21:00


     





 


 Sevelamer HCl  2,400 mg  TIDWM


 PO  3/5/25 08:00


    3/9/25 17:30


2,400 MG


 


 Budesonide  0.5 mg  BID


 NEB  3/4/25 22:00


    3/11/25 09:28


0.5 MG


 


 Midodrine  10 mg  TID@0600,1200,1800


 PO  3/5/25 06:00


    3/10/25 18:36


10 MG


 


 Levalbuterol HCl  0.625 mg  Q6HR


 NEB  3/6/25 06:00


    3/11/25 05:53


0.625 MG


 


 Phenylephrine HCl


 80 mg/Sodium


 Chloride  250 ml @ 


 7.5 mls/hr  Q24H


 IV  3/6/25 15:00


    3/10/25 13:05


33.75 MLS/HR


 


 Vancomycin HCl  0 ml @ 0


 mls/hr  UD


 IV  3/8/25 09:00


     





 


 Meropenem  50 ml @ 17


 mls/hr  DAILY


 IV  3/10/25 10:00


    3/11/25 09:39


17 MLS/HR


 


 Norepinephrine


 Bitartrate 32 mg/


 Sodium Chloride  250 ml @ 


 0.938 mls/


 hr  Q24H


 IV  3/9/25 13:45


    3/10/25 08:21


9.375 MLS/HR


 


 Epoetin Kiran-epbx  10,000 unit  MWF@2100


 SC  3/12/25 21:00


     





 


 Docusate Sodium  100 mg  DAILY


 PO  3/11/25 10:00


    3/11/25 09:50


100 MG


 


 Amino Acids  0 ml @ 0


 mls/hr  PER  PHARMACY


 IV  3/10/25 15:00


     





 


 Pantoprazole


 Sodium  50 ml @ 10


 mls/hr  Q5H


 IV  3/10/25 15:00


    3/11/25 05:55


10 MLS/HR


 


 Diagnostic Test


  (Pha)  1 strip  Q6HR


 VI  3/10/25 18:00


    3/11/25 05:59


1 STRIP


 


 Insulin Human


 Regular  FOLLOW


 SLIDING


 SCALE  Q6HR


 SC  3/10/25 18:00


     





 


 Dextrose  50 ml  UD


 IV  3/10/25 15:30


     





 


 Amino Acids  1,000 ml @ 


 41 mls/hr  DAILY@2200


 IV  3/10/25 22:00


    3/10/25 22:00


41 MLS/HR


 


 Metoclopramide HCl  5 mg  Q8HPRN  PRN


 IV  3/10/25 23:15


    3/11/25 09:39


5 MG








Examination:  LUNGS:Normal, CVS:Normal, MSK:Normal


laboratory and microbiology


                                Laboratory Tests


3/11/25 03:00

















Test


 3/11/25


03:00 Range/Units


 


 


Serum Glucose 94    mg/dL








                                  Microbiology








 Date/Time


Source Procedure


Growth Status





 


 3/8/25 16:30


Blood Blood Culture - Preliminary


NO GROWTH AFTER 48 HOURS OF INCUBATION. Resulted





 3/6/25 05:30


Peritoneal Fluid Gram Stain - Final


 Complete


 


 3/6/25 05:30


Peritoneal Fluid Body Fluid Culture - Final


 Complete


 


 3/5/25 23:00


Nose MRSA Screen - Final


 Complete











Problem List/Assessment/Plan


Problem List/Assessment/Plan


ESRD on peritoneal dialysis


septic Shock


AFib with RVR


Hyperkalemia, resolved


Lactic acidosis0


Dropping hemoglobin


GI bleeding





Recommendations


Continue peritoneal dialysis @ 5 exchanges per 24 hrs


Use 2.5% Dianeal solution 2 L


Strict I&Os


PD fluid white blood cell count is normal


Packed red blood transfusion p.r.n.


IV antibiotic


IV pressors for blood pressure support


Midodrine 10 mg p.o. t.i.d.


GI consult


Cardiology consult


We will continue to follow


Plan discussed with:  Patient, Daughter





My Orders


My Orders





                        Orders - FAIZA MORALES MD








Procedure Category Date Status





  Time 


 


Potassium Chl Humberto PHA 3/11/25 Transmitted





KCL  10:30 











Dietary Evaluation Review


Comments:  


Encourage high protein diet. consider Nepro 240ml 19, 432 kcal. PO  


supplements BID


Expected Outcomes/Goals:  


maintain protein levels WNL











FAIZA MORALES MD        Mar 11, 2025 10:24

## 2025-03-12 NOTE — DVHPN2
Progress Note


Date Seen:  Mar 12, 2025


Resident Creating Document:  SIMONA OCASIO RESIDENT


Medical Necessity Reason


Pt with a Central, PICC or Fol:  Yes





Subjective


Review of Systems


82-year-old Croatian-speaking female, ER staff translating, with PMH of ESRD, HLD

and HTN presented to ER with chest pain. Patient complains of nausea and 

vomiting for one day, mostly having brown-colored emesis per RN.  Patient has 

abdominal pain, periumbilical area. Last BM five days ago, usually patient has 

bowel movements every 2-3 days.  No melena or red blood in stool. Patient has 

had multiple colonoscopy in the past, last colonoscopy more than five years ago,

unsure of results. Patient has history of GERD.  No history of PUD.  No EGD in 

past.


Patient's abdomen is distended, patient is on peritoneal dialysis, patient got 1

unit of PRBC, today hemoglobin 9.1.  Patient is feeling nauseated but did not 

vomit, patient is getting Reglan.


Currently breathing on room air.





Objective


vital signs





                                   Vital Sign








  Date Time  Temp Pulse Resp B/P (MAP) Pulse Ox O2 Delivery O2 Flow Rate FiO2


 


3/12/25 07:21  104 18  98   


 


3/12/25 07:16      Room Air  


 


3/12/25 06:30    120/47 (71)    


 


3/12/25 06:00       0 21


 


3/12/25 03:45 98.1       





 98.1       














                           Total Intake and Output   


 


 3/11/25 3/11/25 3/12/25





 15:00 23:00 07:00


 


Intake Total 992.721 ml 998.469 ml 838.626 ml


 


Output Total   0 ml


 


Balance 992.721 ml 998.469 ml 838.626 ml








medications





                               Current Medications








 Medications  Dose


 Ordered  Sig/Anthony


 Route  Start Time


 Stop Time Status Last Admin


Dose Admin


 


 Ondansetron HCl  4 mg  Q4HP  PRN


 IV  3/4/25 17:30


    3/11/25 05:46


4 MG


 


 Morphine Sulfate  2 mg  Q4HPRN  PRN


 IV  3/4/25 17:30


    3/11/25 05:47


2 MG


 


 Morphine Sulfate  2 mg  Q30M  PRN


 IV  3/4/25 17:30


     





 


 Acetaminophen/


 Hydrocodone Bitart  1 tab  Q4HP  PRN


 PO  3/4/25 17:30


    3/9/25 14:49


1 TAB


 


 Acetaminophen  650 mg  Q6HP  PRN


 PO  3/4/25 17:30


    3/10/25 09:57


650 MG


 


 Nitroglycerin  0.4 mg  Q5MINP  PRN


 SL  3/4/25 17:30


     





 


 Hydroxychloroquine


 Sulfate  200 mg  BID


 PO  3/4/25 22:00


    3/12/25 10:46


200 MG


 


 Levothyroxine


 Sodium  100 mcg  DAILY


 PO  3/5/25 10:00


    3/12/25 10:47


100 MCG


 


 Atorvastatin


 Calcium  10 mg  HS


 PO  3/4/25 22:00


    3/11/25 22:10


10 MG


 


 Docusate Sodium  100 mg  BIDP  PRN


 PO  3/4/25 21:00


     





 


 Sevelamer HCl  2,400 mg  TIDWM


 PO  3/5/25 08:00


    3/12/25 11:46


2,400 MG


 


 Budesonide  0.5 mg  BID


 NEB  3/4/25 22:00


    3/11/25 18:41


0.5 MG


 


 Midodrine  10 mg  TID@0600,1200,1800


 PO  3/5/25 06:00


    3/12/25 11:46


10 MG


 


 Levalbuterol HCl  0.625 mg  Q6HR


 NEB  3/6/25 06:00


    3/12/25 00:18


0.625 MG


 


 Phenylephrine HCl


 80 mg/Sodium


 Chloride  250 ml @ 


 7.5 mls/hr  Q24H


 IV  3/6/25 15:00


    3/12/25 09:03


19.688 MLS/HR


 


 Vancomycin HCl  0 ml @ 0


 mls/hr  UD


 IV  3/8/25 09:00


     





 


 Meropenem  50 ml @ 17


 mls/hr  DAILY


 IV  3/10/25 10:00


    3/12/25 10:47


17 MLS/HR


 


 Norepinephrine


 Bitartrate 32 mg/


 Sodium Chloride  250 ml @ 


 0.938 mls/


 hr  Q24H


 IV  3/9/25 13:45


    3/11/25 13:41


2.813 MLS/HR


 


 Epoetin Kiran-epbx  10,000 unit  MWF@2100


 SC  3/12/25 21:00


     





 


 Docusate Sodium  100 mg  DAILY


 PO  3/11/25 10:00


    3/12/25 10:47


100 MG


 


 Amino Acids  0 ml @ 0


 mls/hr  PER  PHARMACY


 IV  3/10/25 15:00


     





 


 Diagnostic Test


  (Pha)  1 strip  Q6HR


 VI  3/10/25 18:00


    3/12/25 11:47


1 STRIP


 


 Insulin Human


 Regular  FOLLOW


 SLIDING


 SCALE  Q6HR


 SC  3/10/25 18:00


     





 


 Dextrose  50 ml  UD


 IV  3/10/25 15:30


     





 


 Amino Acids  1,000 ml @ 


 41 mls/hr  DAILY@2200


 IV  3/10/25 22:00


    3/11/25 22:10


41 MLS/HR


 


 Metoclopramide HCl  5 mg  Q8HPRN  PRN


 IV  3/10/25 23:15


    3/11/25 09:39


5 MG


 


 Polyethylene


 Glycol  17 gm  BID


 PO  3/11/25 14:45


    3/12/25 10:46


17 GM


 


 Pantoprazole


 Sodium  40 mg  BID


 IV  3/11/25 22:00


    3/12/25 10:47


40 MG


 


 Epoetin Kiran-epbx  10,000 unit  ONCE@2100


 SC  3/12/25 21:00


     





 


 Bisacodyl  10 mg  Q12HR


 ID  3/12/25 22:00


     





 


 Lactulose  30 ml  Q4HR


 PO  3/12/25 14:00


     











Examination


GENERAL:  In mild acute distress.  


HEENT: EOMI,  Moist mucous membranes.  No scleral icterus.  No cervical 

lymphadenopathy.


LUNGS: Clear to auscultation bilaterally.  No accessory muscle use.


CARDIOVASCULAR: Regular rate and rhythm.  No murmur.  No JVD.


ABDOMEN: Mild-to-moderate periumbilical tenderness to palpation, +BS, ABD is 

distended


EXTREMITIES: No edema.  Nontender.


SKIN: No rashes or lesions.  Warm.


NEUROLOGIC:  Alert and oriented X3


laboratory and microbiology


                                Laboratory Tests


3/12/25 03:00

















Test


 3/12/25


03:00 Range/Units


 


 


Serum Glucose 99    mg/dL








                                  Microbiology








 Date/Time


Source Procedure


Growth Status





 


 3/8/25 16:30


Blood Blood Culture - Preliminary


NO GROWTH AFTER 72 HOURS OF INCUBATION. Resulted





 3/6/25 05:30


Peritoneal Fluid Gram Stain - Final


 Complete


 


 3/6/25 05:30


Peritoneal Fluid Body Fluid Culture - Final


 Complete


 


 3/5/25 23:00


Nose MRSA Screen - Final


 Complete











Problem List/Assessment/Plan


Problem List/Assessment/Plan


# Intractable nausea vomiting


# Abdominal pain


# GI bleed


# Anemia


# ESRD on peritoneal dialysis


# AFib on blood thinners


# Lactic acidosis





- SOBT pending


- patient got one unit PRBC, hemoglobin is stable now.  today hemoglobin is 9.1


- Monitor H&H, transfuse if hemoglobin less than 7.0


- Colace b.i.d., consider use of lactulose if no bowel movements


- Plan for EGD when  patient is stable


- Continue Protonix 40 mg  b.i.d.


- Cont. MiraLax





Thank you so much for the opportunity to consult on your patient. GI team will 

follow the patient.  In case of any questions or concerns please feel free to 

reach out.





Case discussed with Dr. JERONIMO Leblanc.  The patient and caregiver team agreed to the

plan.


Plan discussed with:  Patient





Dietary Evaluation Review


Comments:  


Encourage high protein diet. consider Nepro 240ml 19, 432 kcal. PO  


supplements BID


Expected Outcomes/Goals:  


maintain protein levels SIMONA JOHNSON RESIDENT        Mar 12, 2025 13:31

## 2025-03-12 NOTE — DVHPN2
Progress Note - Dictate


Date Seen:  Mar 12, 2025


Medical Necessity Reason


Pt with a Central, PICC or Fol:  Yes


Subjective


Patient still complaining of abdominal discomfort where she was peritoneal 

dialysis catheter.


vital signs





                                   Vital Sign








  Date Time  Temp Pulse Resp B/P (MAP) Pulse Ox O2 Delivery O2 Flow Rate FiO2


 


3/12/25 07:21  104 18  98   


 


3/12/25 07:16      Room Air  


 


3/12/25 06:30    120/47 (71)    


 


3/12/25 06:00       0 21


 


3/12/25 03:45 98.1       





 98.1       














                           Total Intake and Output   


 


 3/11/25 3/11/25 3/12/25





 15:00 23:00 07:00


 


Intake Total 992.721 ml 998.469 ml 838.626 ml


 


Output Total   0 ml


 


Balance 992.721 ml 998.469 ml 838.626 ml








medications





                               Current Medications








 Medications  Dose


 Ordered  Sig/Anthony


 Route  Start Time


 Stop Time Status Last Admin


Dose Admin


 


 Ondansetron HCl  4 mg  Q4HP  PRN


 IV  3/4/25 17:30


    3/11/25 05:46


4 MG


 


 Morphine Sulfate  2 mg  Q4HPRN  PRN


 IV  3/4/25 17:30


    3/11/25 05:47


2 MG


 


 Morphine Sulfate  2 mg  Q30M  PRN


 IV  3/4/25 17:30


     





 


 Acetaminophen/


 Hydrocodone Bitart  1 tab  Q4HP  PRN


 PO  3/4/25 17:30


    3/9/25 14:49


1 TAB


 


 Acetaminophen  650 mg  Q6HP  PRN


 PO  3/4/25 17:30


    3/10/25 09:57


650 MG


 


 Nitroglycerin  0.4 mg  Q5MINP  PRN


 SL  3/4/25 17:30


     





 


 Hydroxychloroquine


 Sulfate  200 mg  BID


 PO  3/4/25 22:00


    3/11/25 22:10


200 MG


 


 Levothyroxine


 Sodium  100 mcg  DAILY


 PO  3/5/25 10:00


    3/11/25 09:49


100 MCG


 


 Atorvastatin


 Calcium  10 mg  HS


 PO  3/4/25 22:00


    3/11/25 22:10


10 MG


 


 Docusate Sodium  100 mg  BIDP  PRN


 PO  3/4/25 21:00


     





 


 Sevelamer HCl  2,400 mg  TIDWM


 PO  3/5/25 08:00


    3/12/25 09:02


2,400 MG


 


 Budesonide  0.5 mg  BID


 NEB  3/4/25 22:00


    3/11/25 18:41


0.5 MG


 


 Midodrine  10 mg  TID@0600,1200,1800


 PO  3/5/25 06:00


    3/12/25 06:27


10 MG


 


 Levalbuterol HCl  0.625 mg  Q6HR


 NEB  3/6/25 06:00


    3/12/25 00:18


0.625 MG


 


 Phenylephrine HCl


 80 mg/Sodium


 Chloride  250 ml @ 


 7.5 mls/hr  Q24H


 IV  3/6/25 15:00


    3/12/25 09:03


19.688 MLS/HR


 


 Vancomycin HCl  0 ml @ 0


 mls/hr  UD


 IV  3/8/25 09:00


     





 


 Meropenem  50 ml @ 17


 mls/hr  DAILY


 IV  3/10/25 10:00


    3/11/25 09:39


17 MLS/HR


 


 Norepinephrine


 Bitartrate 32 mg/


 Sodium Chloride  250 ml @ 


 0.938 mls/


 hr  Q24H


 IV  3/9/25 13:45


    3/11/25 13:41


2.813 MLS/HR


 


 Epoetin Kiran-epbx  10,000 unit  MWF@2100


 SC  3/12/25 21:00


     





 


 Docusate Sodium  100 mg  DAILY


 PO  3/11/25 10:00


    3/11/25 09:50


100 MG


 


 Amino Acids  0 ml @ 0


 mls/hr  PER  PHARMACY


 IV  3/10/25 15:00


     





 


 Diagnostic Test


  (Pha)  1 strip  Q6HR


 VI  3/10/25 18:00


    3/12/25 06:27


1 STRIP


 


 Insulin Human


 Regular  FOLLOW


 SLIDING


 SCALE  Q6HR


 SC  3/10/25 18:00


     





 


 Dextrose  50 ml  UD


 IV  3/10/25 15:30


     





 


 Amino Acids  1,000 ml @ 


 41 mls/hr  DAILY@2200


 IV  3/10/25 22:00


    3/11/25 22:10


41 MLS/HR


 


 Metoclopramide HCl  5 mg  Q8HPRN  PRN


 IV  3/10/25 23:15


    3/11/25 09:39


5 MG


 


 Polyethylene


 Glycol  17 gm  BID


 PO  3/11/25 14:45


    3/11/25 22:12


17 GM


 


 Lactulose  30 ml  Q4HR  PRN


 PO  3/11/25 14:45


    3/11/25 17:12


30 ML


 


 Bisacodyl  10 mg  DAILYP  PRN


 CA  3/11/25 14:45


     





 


 Pantoprazole


 Sodium  40 mg  BID


 IV  3/11/25 22:00


    3/11/25 22:11


40 MG








objective


General Appearance:  Alert, , mild distress


HEENT:  Atraumatic, PERRLA, EOMI


Respiratory:  Clear to auscultation, Normal air movement


Cardiovascular:  Normal S1, Normal S2, Other (afib)


Abdominal:  non tender. 


Extremities:  No clubbing, No cyanosis, Normal pulses ,edema +


Skin:  No rashes, No breakdown


Neuro:  grossly intact


laboratory and microbiology


                                Laboratory Tests


3/12/25 03:00

















Test


 3/12/25


03:00 Range/Units


 


 


Serum Glucose 99    mg/dL








Assessment/Plan


Patient is a 82-year-old female presents to the hospital with:





Hypotension/Shock


Lactic Acidosis 


Afib uncontrolled


Chest pain


Hyperkalemia


ESRD on PD


Chronic abdominal pain


Diarrhea 





Recommendations:


Patient is still on 2 pressors, however slightly decreasing Levophed use.








Continue Vancomycin/ Meropenem empirically


Consider A line for accurate BP due  to arm edema


Pulm/ crit care on board


follow blood culture


no clear source of infection


Recommend GI consult





hypotension is likely multifactorial cardiac due to Afib with rvr vs medication 

induced/ acidosis





s/p  Peritoneal fluid analysis: cell count not qualifying for peritonitis. 

cultures are also negative 








Antibiotic status:


Meropenem IV [Restarted on 03/10]


Vancomycin IV [Started on 03/05 - 03/08]





03/07, Vancomycin Random is 20.2. 





03/04, Abdomen Pelvis CT showed No acute intra-abdominal finding. Isodense 

lesion of the right mid kidney measuring 1.2 cm, attention on follow-up is 

recommended.  


Compression fracture of L2 and possibly superior endplate of L1 with mild 

posterior extension resulting in mild spinal canal stenosis, age-indeterminate  





03/04, Blood culture showed no growth





03/06, Fluid culture preliminary showed no growth





03/05, MRSA screening negative





critical care time 35 minutes which includes assessment, coordinating plan with 

nurse and consultants. 





prognosis poor


plan discussed with RN





Thank you for consult.





Dietary Evaluation Review


Comments:  


Encourage high protein diet. consider Nepro 240ml 19, 432 kcal. PO  


supplements BID


Expected Outcomes/Goals:  


maintain protein levels WNL


Plan discussed with:  Patient











GELA ELLISON MD            Mar 12, 2025 09:59

## 2025-03-12 NOTE — DVHINCON2
Date of service:  Mar 12, 2025





Family History:  


Cerebrovascular accident (CVA)


  G8 FATHER


Diabetes mellitus


  G8 FATHER


Hypertension


  G8 FATHER


Rectal cancer


  G8 MOTHER





Allergies:  


Coded Allergies:  


     NO KNOWN ALLERGIES (Unverified , 12/23/22)


Home Meds


Active Scripts


Hydrocodone-Acetaminophen (Hydrocodone Bitartrate/AC 5-325 mg) 1 Tab Tab, 1 TAB 

PO Q8HP PRN, #14 TAB


   Prov:MARÍA OCONNELL MD         12/26/22


Erythromycin (Erythromycin) 5 Mg/Gm Oin, 1 DROP OP QID for 5 Days, #1 OIN 0 

Refills


   Prov:MARLEN GIPSON         5/3/22


Reported Medications


Zinc Gluconate (ZINC) 100 Mg Tab, 50 MG PO DAILY, TAB


   12/23/22


Aspirin (ASPIRIN 81) 81 Mg Tab, 81 MG PO DAILY, TAB


   12/23/22


Ferrous Sulfate (Ferrous Sulfate) 325 Mg Tab, 325 MG PO TIDWM for 30 Days


   12/23/22


Hydroxychloroquine Sulfate (Hydroxychloroquine Sulfat) 200 Mg Tab, 200 MG PO BID

for 30 Days, MG


   12/23/22


Docusate Sodium (Docusate Sodium) 100 Mg Tab, 100 MG PO BIDP for 30 Days, MG


   12/23/22


Atorvastatin Calcium (ATORVASTATIN CALCIUM) 10 Mg Tab, 1 TAB PO HS


   12/23/22


Potassium Chloride (Potassium Chloride ER) 10 Meq Tab, 1 TAB PO DAILY


   12/23/22


Alendronate Sodium (Alendronate Sodium) 70 Mg Tab, 1 TAB PO QWEEKLY


   12/23/22


Trazodone Hcl (Trazodone Hcl) 50 Mg Tab, 1 TAB PO HS


   12/23/22


Fluticasone Furoate-Vilanterol (Breo Ellipta 200-25 Mcg/INH) 1 Inh Inh, 1 PUFF 

INH DAILY


   12/23/22


Sevelamer Carbonate (Sevelamer Carbonate) 2.4 Gm Pow, 1 PKT PO TID


   12/23/22


Losartan Potassium (Losartan Potassium) 25 Mg Tab, 1 TAB PO BID


   12/23/22


Levothyroxine Sodium (Levothyroxine Sodium) 100 Mcg Tab, 1 TAB PO DAILY


   12/23/22


Hctz (Hydrochlorothiazide) 25 Mg Tab, 1 TAB PO DAILY


   12/23/22


Current Medications





                               Current Medications








 Medications


  (Trade)  Dose


 Ordered  Sig/Anthony


 Route


 PRN Reason  Start Time


 Stop Time Status Last Admin


 


 Epoetin Kiran-epbx


  (Retacrit)  10,000 unit  MWF@2100


 SC


   3/12/25 21:00


     





 


 Pantoprazole


 Sodium


  (Protonix)  40 mg  BID


 IV


   3/11/25 22:00


    3/12/25 10:47





 


 Epoetin Kiran-epbx


  (Retacrit)  10,000 unit  ONCE@2100


 SC


   3/12/25 21:00


     





 


 Bisacodyl


  (Dulcolax


 Suppository)  10 mg  Q12HR


 MT


   3/12/25 22:00


     





 


 Lactulose  30 ml  Q4HR


 PO


   3/12/25 14:00


    3/12/25 14:40





 


 Amiodarone HCl


  (Cordarone


 Tablet)  200 mg  Q12HR


 PO


   3/12/25 22:00


     





 


 Guaifenesin/


 Dextromethorphan


  (Robitussin-Dm


 Liquid)  10 ml  Q4HP  PRN


 PO


 FOR COUGH  3/12/25 17:30


     














Vital Signs





                                   Vital Signs








  Date Time  Temp Pulse Resp B/P (MAP) Pulse Ox O2 Delivery O2 Flow Rate FiO2


 


3/12/25 16:30 98.4 68 22 108/45 (66) 99   





 98.4       


 


3/12/25 16:00      Room Air* 0 21











Labs/Diagnostic Data





                                      Labs








Test


 3/12/25


11:44 3/12/25


03:00 3/11/25


03:00 3/10/25


21:07 Range/Units


 


 


POC Glucose 99       mg/dl


 


White Blood Count


 


 9.7 


 


 


 4.4-10.8


10^3/uL


 


Red Blood Count


 


 3.27 L


 


 


 4.0-5.20


10^6/uL


 


Hemoglobin  9.1 L   12.2-16.2  g/dL


 


Hematocrit  27.8 #L   36.0-46.0  %


 


Mean Corpuscular Volume  84.8    80.0-100.0  fL


 


Mean Corpuscular Hemoglobin  27.8 L   28.0-32.0  pg


 


Mean Corpuscular Hemoglobin


Concent 


 32.7 


 


 


 32.0-36.0  g/dL





 


Red Cell Distribution Width  18.0 H   11.8-14.3  %


 


Platelet Count


 


 180 


 


 


 140-450


10^3/uL


 


Mean Platelet Volume  7.1    6.9-10.8  fL


 


Neutrophils (%) (Auto)  79.4    37.0-80.0  %


 


Lymphocytes (%) (Auto)  12.1    10.0-50.0  %


 


Monocytes (%) (Auto)  7.5    0.0-12.0  %


 


Eosinophils (%) (Auto)  0.7    0.0-7.0  %


 


Basophils (%) (Auto)  0.3    0.0-2.0  %


 


Neutrophils # (Auto)


 


 7.7 


 


 


 1.6-8.6  10


^3/uL


 


Lymphocytes # (Auto)


 


 1.2 


 


 


 0.4-5.4  10


^3/uL


 


Monocytes # (Auto)  0.7    0-1.3  10 ^3/uL


 


Eosinophils # (Auto)  0.1    0-0.8  10 ^3/uL


 


Basophils # (Auto)  0    0-0.2  10 ^3/uL


 


Nucleated Red Blood Cells  0.3     %


 


Prothrombin Time  21.1 H   9.3-11.8  sec


 


Prothrombin Time INR  2.15 H   0.9-1.15  


 


Sodium Level  131 L   136-145  mmol/L


 


Potassium Level  4.2    3.5-5.1  mmol/L


 


Chloride Level  97 L     mmol/L


 


Carbon Dioxide Level  20    20-31  mmol/L


 


Anion Gap  14    5-15  


 


Blood Urea Nitrogen  30 H   9-23  mg/dL


 


Creatinine


 


 4.35 H


 


 


 0.550-1.02


mg/dL


 


Glomerular Filtration Rate


Calc 


 10 


 


 


 >90  mL/min





 


BUN/Creatinine Ratio  6.9 L   10.0-20.0  


 


Serum Glucose  99      mg/dL


 


Calcium Level  8.7    8.7-10.4  mg/dL


 


Phosphorus Level  2.6    2.4-5.1  mg/dL


 


Magnesium Level  2.1    1.6-2.6  mg/dL


 


Total Bilirubin  0.3    0.2-1.0  mg/dL


 


Aspartate Amino Transferase


(AST) 


 56 H


 


 


 13-40  U/L





 


Alanine Aminotransferase (ALT)  21    7-40  U/L


 


Alkaline Phosphatase  118 H     U/L


 


Total Protein  4.3 L   5.7-8.2  g/dL


 


Albumin  2.9 L   3.2-4.8  g/dL


 


Random Vancomycin Level  21.4 H   5-10  ug/mL


 


Miscellaneous Referred Test


(Rm Tmp 


 


 Sent to


[facility] 


  





 


Iron Level   86     ug/dL


 


Total Iron Binding Capacity   118 L  250-425  ug/dL


 


Percent Iron Saturation   72.9 H  15-50  %


 


Ferritin   3250.1 H    ng/mL


 


Triglycerides Level   83   < 150  mg/dL


 


Activated Partial


Thromboplast Time 


 


 


 57.5 H


 24.5-34.5  SEC





 


Test


 3/10/25


19:10 3/10/25


12:50 3/10/25


08:42 3/10/25


03:00 Range/Units


 


 


Body Fluid Source


 Peritoneal


fluid 


 


 


  





 


Body Fluid WBC (Manual) 70     0-200  CUMM


 


Body Fluid RBC (Manual) 0     0-2000  CUMM


 


Body Fluid Mononuclear Cells 50      %


 


Body Fluid Polymorphonuclear


Cells 50 H


 


 


 


 0-25  %





 


Platelet Estimate  Adequate     


 


Anisocytosis (manual)  Slight     


 


Lactic Acid Level   3.6 *H  0.4-2.0  mmol/L


 


Vitamin D 25-Hydroxy   94.3   30.0-100  ng/mL


 


Cortisol AM Sample


 


 


 41.45 H


 


 5.27-22.45


ug/dL


 


Parathyroid Hormone (Intact)


 


 


 


 534.7 H


 18.4-80.1


pg/mL


 


Test


 3/9/25


16:55 3/9/25


03:20 3/4/25


16:30 3/4/25


14:50 Range/Units


 


 


Thyroid Stimulating Hormone


(TSH) 2.61 


 


 


 


 0.55-4.78


uIU/mL


 


Free Thyroxine (T4) Calculated


 1.32 


 


 


 


 0.89-1.76


ng/dL


 


Cortisol PM Sample


 58.71 H


 


 


 


 3.44-16.76


ug/dL


 


Differential Total Cells


Counted 


 100.0 


 


 


 100  





 


Neutrophils % (Manual)  80    37.0-80.0  


 


Band Neutrophils % (Manual)  0     


 


Lymphocytes % (Manual)  12    10.0-50.0  


 


Monocytes % (Manual)  8    0-12  


 


Eosinophils % (Manual)  0    0-7  


 


Basophils % (Manual)  0    0.0-2.0  


 


Metamyelocytes % (manual)  0     


 


Myelocytes % (Manual)  0     


 


Promyelocytes % (Manual)  0     


 


Blast Cells % (Manual)  0     


 


Reactive Lymphocytes  0     


 


Urine Color   Colorless   Yellow  


 


Urine Clarity   Clear   Clear  


 


Urine pH   7.5   5.0-9.0  


 


Urine Specific Gravity   1.007   1.001-1.035  


 


Urine Protein   1+ H  Negative  


 


Urine Ketones   Negative   Negative  


 


Urine Blood   Negative   Negative  /uL


 


Urine Nitrite   Negative   Negative  


 


Urine Bilirubin   Negative   Negative  


 


Urine Urobilinogen   Normal   Negative  mg/dL


 


Urine Leukocyte Esterase   Negative   Negative  /uL


 


Urine RBC   6   0 - 4  /hpf


 


Urine Microscopic WBC   3   0-5  /HPF


 


Urine Squamous Epithelial


Cells 


 


 None seen 


 


 <5  /hpf





 


Urine Bacteria   None seen   None Seen  /hpf


 


Urine Mucus   Few   None Seen  


 


Urine Glucose   4+ H  Normal  mg/dL


 


Troponin I High Sensitivity    26  </=34  ng/L


 


Test


 3/4/25


11:43 


 


 


 Range/Units


 


 


B-Type Natriuretic Peptide 250.70     0-100  pg/mL








                                  Microbiology








 Date/Time


Source Procedure


Growth Status





 


 3/8/25 16:30


Blood Blood Culture - Preliminary


NO GROWTH AFTER 72 HOURS OF INCUBATION. Resulted





 3/6/25 05:30


Peritoneal Fluid Gram Stain - Final


 Complete


 


 3/6/25 05:30


Peritoneal Fluid Body Fluid Culture - Final


 Complete


 


 3/5/25 23:00


Nose MRSA Screen - Final


 Complete











Assessment


[id number]


CONSTIPATION/ILEUS


AFEBRILE


DEHYDRATED ON MINIMAL VASOPRESSOR


ABD SOFT DISTENDED


WBC WNL


NOT AN AC ABD


CLOSE OBSERVATION


KEEP NPO


HYDRATION AS INDICATED


BEDSIDE FECAL DISIMPACTION DONE


CBC CMP LACTATE AM


NURSE TA BEDSIDE


Plan discussed with:  Other











ANA CARLSON MD             Mar 12, 2025 17:50

## 2025-03-12 NOTE — DVHPN2
Progress Note - Dictate


Date Seen:  Mar 12, 2025


Medical Necessity Reason


Pt with a Central, PICC or Fol:  Yes


Subjective


Patient is seen and evaluated in the ICU discussed with the nurse at bedside.  CARLIE blackwoodient still complaining of abdominal discomfort where she was peritoneal 

dialysis catheter.  Therefore she was undergoing CT of the abdomen pelvis per 

Nephrology recommendations.


vital signs





                                   Vital Sign








  Date Time  Temp Pulse Resp B/P (MAP) Pulse Ox O2 Delivery O2 Flow Rate FiO2


 


3/12/25 14:00   18  100 Room Air* 0 21


 


3/12/25 14:00  82      


 


3/12/25 06:30    120/47 (71)    


 


3/12/25 03:45 98.1       





 98.1       














                           Total Intake and Output   


 


 3/11/25 3/11/25 3/12/25





 15:00 23:00 07:00


 


Intake Total 992.721 ml 998.469 ml 838.626 ml


 


Output Total   0 ml


 


Balance 992.721 ml 998.469 ml 838.626 ml








medications





                               Current Medications








 Medications  Dose


 Ordered  Sig/Anthony


 Route  Start Time


 Stop Time Status Last Admin


Dose Admin


 


 Ondansetron HCl  4 mg  Q4HP  PRN


 IV  3/4/25 17:30


    3/11/25 05:46


4 MG


 


 Morphine Sulfate  2 mg  Q4HPRN  PRN


 IV  3/4/25 17:30


    3/11/25 05:47


2 MG


 


 Morphine Sulfate  2 mg  Q30M  PRN


 IV  3/4/25 17:30


     





 


 Acetaminophen/


 Hydrocodone Bitart  1 tab  Q4HP  PRN


 PO  3/4/25 17:30


    3/9/25 14:49


1 TAB


 


 Acetaminophen  650 mg  Q6HP  PRN


 PO  3/4/25 17:30


    3/10/25 09:57


650 MG


 


 Nitroglycerin  0.4 mg  Q5MINP  PRN


 SL  3/4/25 17:30


     





 


 Hydroxychloroquine


 Sulfate  200 mg  BID


 PO  3/4/25 22:00


    3/12/25 10:46


200 MG


 


 Levothyroxine


 Sodium  100 mcg  DAILY


 PO  3/5/25 10:00


    3/12/25 10:47


100 MCG


 


 Atorvastatin


 Calcium  10 mg  HS


 PO  3/4/25 22:00


    3/11/25 22:10


10 MG


 


 Docusate Sodium  100 mg  BIDP  PRN


 PO  3/4/25 21:00


     





 


 Sevelamer HCl  2,400 mg  TIDWM


 PO  3/5/25 08:00


    3/12/25 11:46


2,400 MG


 


 Budesonide  0.5 mg  BID


 NEB  3/4/25 22:00


    3/12/25 06:00


0.5 MG


 


 Midodrine  10 mg  TID@0600,1200,1800


 PO  3/5/25 06:00


    3/12/25 11:46


10 MG


 


 Levalbuterol HCl  0.625 mg  Q6HR


 NEB  3/6/25 06:00


    3/12/25 13:39


0.625 MG


 


 Phenylephrine HCl


 80 mg/Sodium


 Chloride  250 ml @ 


 7.5 mls/hr  Q24H


 IV  3/6/25 15:00


    3/12/25 09:03


19.688 MLS/HR


 


 Vancomycin HCl  0 ml @ 0


 mls/hr  UD


 IV  3/8/25 09:00


     





 


 Meropenem  50 ml @ 17


 mls/hr  DAILY


 IV  3/10/25 10:00


    3/12/25 10:47


17 MLS/HR


 


 Norepinephrine


 Bitartrate 32 mg/


 Sodium Chloride  250 ml @ 


 0.938 mls/


 hr  Q24H


 IV  3/9/25 13:45


    3/11/25 13:41


2.813 MLS/HR


 


 Epoetin Kiran-epbx  10,000 unit  MWF@2100


 SC  3/12/25 21:00


     





 


 Docusate Sodium  100 mg  DAILY


 PO  3/11/25 10:00


    3/12/25 10:47


100 MG


 


 Amino Acids  0 ml @ 0


 mls/hr  PER  PHARMACY


 IV  3/10/25 15:00


     





 


 Diagnostic Test


  (Pha)  1 strip  Q6HR


 VI  3/10/25 18:00


    3/12/25 11:47


1 STRIP


 


 Insulin Human


 Regular  FOLLOW


 SLIDING


 SCALE  Q6HR


 SC  3/10/25 18:00


     





 


 Dextrose  50 ml  UD


 IV  3/10/25 15:30


     





 


 Amino Acids  1,000 ml @ 


 41 mls/hr  DAILY@2200


 IV  3/10/25 22:00


    3/11/25 22:10


41 MLS/HR


 


 Metoclopramide HCl  5 mg  Q8HPRN  PRN


 IV  3/10/25 23:15


    3/11/25 09:39


5 MG


 


 Polyethylene


 Glycol  17 gm  BID


 PO  3/11/25 14:45


    3/12/25 10:46


17 GM


 


 Pantoprazole


 Sodium  40 mg  BID


 IV  3/11/25 22:00


    3/12/25 10:47


40 MG


 


 Epoetin Kiran-epbx  10,000 unit  ONCE@2100


 SC  3/12/25 21:00


     





 


 Bisacodyl  10 mg  Q12HR


 UT  3/12/25 22:00


     





 


 Lactulose  30 ml  Q4HR


 PO  3/12/25 14:00


    3/12/25 14:40


30 ML








objective


Elderly female in bed without distress.  Heart tachycardia with a S1 plus S2.  

Lungs fair air movement without any audible rales.  Abdomen soft obese positive 

bowel sounds.  Extremities trace edema around the ankles.


laboratory and microbiology


                                Laboratory Tests


3/12/25 03:00

















Test


 3/12/25


03:00 Range/Units


 


 


Serum Glucose 99    mg/dL








Assessment/Plan


Her blood counts are stable.  Blood pressure is improving.  Continue to titrate 

the pressors as she tolerates.  Continue current Protonix and rest of supportive

care and treatment empiric antibiotics as she is on.  Follow the results of CT 

of the abdomen and pelvis that are being done today.  Otherwise further clinical

management per clinical course.  No changes to rest of her management at 

present.  Discussed with the nurse regarding care plan.


Problems(with codes):  


(1) ESRD (end stage renal disease) on dialysis


(2) Hypothyroidism


(3) History of peritoneal dialysis


(4) Acute abdominal pain


(5) Hypotension





Dietary Evaluation Review


Comments:  


Encourage high protein diet. consider Nepro 240ml 19, 432 kcal. PO  


supplements BID


Expected Outcomes/Goals:  


maintain protein levels WNL


Plan discussed with:  Other











BEATRIS CARY MD      Mar 12, 2025 16:17

## 2025-03-12 NOTE — DVHPN2
Progress Note


Date Seen:  Mar 12, 2025


Medical Necessity Reason


Pt with a Central, PICC or Fol:  Yes





Subjective


Review of Systems:  GI:Abnormal


Other Systems:  


Patient seen and examined by myself today in follow-up





Objective


vital signs





                                   Vital Sign








  Date Time  Temp Pulse Resp B/P (MAP) Pulse Ox O2 Delivery O2 Flow Rate FiO2


 


3/12/25 07:21  104 18  98   


 


3/12/25 07:16      Room Air  


 


3/12/25 06:30    120/47 (71)    


 


3/12/25 06:00       0 21


 


3/12/25 03:45 98.1       





 98.1       














                           Total Intake and Output   


 


 3/11/25 3/11/25 3/12/25





 15:00 23:00 07:00


 


Intake Total 992.721 ml 998.469 ml 838.626 ml


 


Output Total   0 ml


 


Balance 992.721 ml 998.469 ml 838.626 ml








medications





                               Current Medications








 Medications  Dose


 Ordered  Sig/Anthony


 Route  Start Time


 Stop Time Status Last Admin


Dose Admin


 


 Ondansetron HCl  4 mg  Q4HP  PRN


 IV  3/4/25 17:30


    3/11/25 05:46


4 MG


 


 Morphine Sulfate  2 mg  Q4HPRN  PRN


 IV  3/4/25 17:30


    3/11/25 05:47


2 MG


 


 Morphine Sulfate  2 mg  Q30M  PRN


 IV  3/4/25 17:30


     





 


 Acetaminophen/


 Hydrocodone Bitart  1 tab  Q4HP  PRN


 PO  3/4/25 17:30


    3/9/25 14:49


1 TAB


 


 Acetaminophen  650 mg  Q6HP  PRN


 PO  3/4/25 17:30


    3/10/25 09:57


650 MG


 


 Nitroglycerin  0.4 mg  Q5MINP  PRN


 SL  3/4/25 17:30


     





 


 Hydroxychloroquine


 Sulfate  200 mg  BID


 PO  3/4/25 22:00


    3/12/25 10:46


200 MG


 


 Levothyroxine


 Sodium  100 mcg  DAILY


 PO  3/5/25 10:00


    3/12/25 10:47


100 MCG


 


 Atorvastatin


 Calcium  10 mg  HS


 PO  3/4/25 22:00


    3/11/25 22:10


10 MG


 


 Docusate Sodium  100 mg  BIDP  PRN


 PO  3/4/25 21:00


     





 


 Sevelamer HCl  2,400 mg  TIDWM


 PO  3/5/25 08:00


    3/12/25 09:02


2,400 MG


 


 Budesonide  0.5 mg  BID


 NEB  3/4/25 22:00


    3/11/25 18:41


0.5 MG


 


 Midodrine  10 mg  TID@0600,1200,1800


 PO  3/5/25 06:00


    3/12/25 06:27


10 MG


 


 Levalbuterol HCl  0.625 mg  Q6HR


 NEB  3/6/25 06:00


    3/12/25 00:18


0.625 MG


 


 Phenylephrine HCl


 80 mg/Sodium


 Chloride  250 ml @ 


 7.5 mls/hr  Q24H


 IV  3/6/25 15:00


    3/12/25 09:03


19.688 MLS/HR


 


 Vancomycin HCl  0 ml @ 0


 mls/hr  UD


 IV  3/8/25 09:00


     





 


 Meropenem  50 ml @ 17


 mls/hr  DAILY


 IV  3/10/25 10:00


    3/12/25 10:47


17 MLS/HR


 


 Norepinephrine


 Bitartrate 32 mg/


 Sodium Chloride  250 ml @ 


 0.938 mls/


 hr  Q24H


 IV  3/9/25 13:45


    3/11/25 13:41


2.813 MLS/HR


 


 Epoetin Kiran-epbx  10,000 unit  MWF@2100


 SC  3/12/25 21:00


     





 


 Docusate Sodium  100 mg  DAILY


 PO  3/11/25 10:00


    3/12/25 10:47


100 MG


 


 Amino Acids  0 ml @ 0


 mls/hr  PER  PHARMACY


 IV  3/10/25 15:00


     





 


 Diagnostic Test


  (Pha)  1 strip  Q6HR


 VI  3/10/25 18:00


    3/12/25 06:27


1 STRIP


 


 Insulin Human


 Regular  FOLLOW


 SLIDING


 SCALE  Q6HR


 SC  3/10/25 18:00


     





 


 Dextrose  50 ml  UD


 IV  3/10/25 15:30


     





 


 Amino Acids  1,000 ml @ 


 41 mls/hr  DAILY@2200


 IV  3/10/25 22:00


    3/11/25 22:10


41 MLS/HR


 


 Metoclopramide HCl  5 mg  Q8HPRN  PRN


 IV  3/10/25 23:15


    3/11/25 09:39


5 MG


 


 Polyethylene


 Glycol  17 gm  BID


 PO  3/11/25 14:45


    3/12/25 10:46


17 GM


 


 Lactulose  30 ml  Q4HR  PRN


 PO  3/11/25 14:45


    3/11/25 17:12


30 ML


 


 Bisacodyl  10 mg  DAILYP  PRN


 HI  3/11/25 14:45


     





 


 Pantoprazole


 Sodium  40 mg  BID


 IV  3/11/25 22:00


    3/12/25 10:47


40 MG








Examination:  LUNGS:Normal, CVS:Normal, MSK:Normal


laboratory and microbiology


                                Laboratory Tests


3/12/25 03:00

















Test


 3/12/25


03:00 Range/Units


 


 


Serum Glucose 99    mg/dL








                                  Microbiology








 Date/Time


Source Procedure


Growth Status





 


 3/8/25 16:30


Blood Blood Culture - Preliminary


NO GROWTH AFTER 72 HOURS OF INCUBATION. Resulted





 3/6/25 05:30


Peritoneal Fluid Gram Stain - Final


 Complete


 


 3/6/25 05:30


Peritoneal Fluid Body Fluid Culture - Final


 Complete


 


 3/5/25 23:00


Nose MRSA Screen - Final


 Complete











Problem List/Assessment/Plan


Problem List/Assessment/Plan


ESRD on peritoneal dialysis


septic Shock


AFib with RVR


Hyperkalemia, resolved


Lactic acidosis0


Dropping hemoglobin


GI bleeding





Recommendations


For PD drainage due to constipation


Continue peritoneal dialysis @ 5 exchanges per 24 hrs


Use 2.5% Dianeal solution 2 L


Strict I&Os


Laxative


PD fluid white blood cell count is normal


Packed red blood transfusion p.r.n.


IV antibiotic


IV pressors for blood pressure support


Midodrine 10 mg p.o. t.i.d.


GI consult


Cardiology consult


We will continue to follow


Plan discussed with:  Patient, Daughter





My Orders


My Orders





                        Orders - FAIZA MORALES MD








Procedure Category Date Status





  Time 


 


Communication Order ORDERS 3/11/25 Transmitted





  14:41 


 


Polyethylene Glycol PHA 3/11/25 In Process





17g Powder (Miralax  14:45 


 


Lactulose Oral PHA 3/11/25 In Process





  14:45 


 


Bisacodyl Suppository PHA 3/11/25 In Process





 (Dulcolax Supposit  14:45 


 


Hemoglobin LAB 3/13/25 Verified





  05:00 


 


Hematocrit LAB 3/13/25 Verified





  05:00 


 


Iron Panel LAB 3/13/25 Verified





  05:00 


 


Transferrin LAB 3/13/25 Verified





  05:00 


 


Ferritin LAB 3/13/25 Verified





  05:00 


 


Retacrit 10,000unit PHA 3/12/25 Verified





Sc X One  21:00 











Dietary Evaluation Review


Comments:  


Encourage high protein diet. consider Nepro 240ml 19, 432 kcal. PO  


supplements BID


Expected Outcomes/Goals:  


maintain protein levels WNL











FAIZA MORALES MD        Mar 12, 2025 10:58

## 2025-03-12 NOTE — DVH
EXAM:  CT Abdomen and Pelvis Without Intravenous Contrast



CLINICAL INDICATION:   distended abdomen/ no BW



TECHNIQUE:  Axial computed tomography images of the abdomen and pelvis without intravenous contrast. 
 This CT exam was performed using one or more of the following dose reduction techniques:  automated 
exposure control, adjustment of the mA and/or kV according to patient size, and/or use of iterative r
econstruction technique.



CONTRAST:       



COMPARISON:   CT CT AB PEL WO CON-NO ORAL OR IV on DOS: 3/5/25, CT ABD PELVIS WO CONTRAST on DOS: 12/
27/22, CT ABD PELVIS WO CONTRAST on DOS: 12/25/22, CT ABD PELVIS WO CONTRAST on DOS: 12/22/22



FINDINGS:



 LUNG BASES:  See below.  



 PLEURAL SPACE:  Bilateral pleural effusions with compressive atelectasis.



ABDOMEN:



 LIVER:  Unremarkable.



 GALLBLADDER AND BILE DUCTS:  Unremarkable.  No calcified stones.  No ductal dilation.



 PANCREAS:  Unremarkable.  No ductal dilation.



 SPLEEN:  Unremarkable.  No splenomegaly.



 ADRENALS:  Unremarkable.  No mass.



 KIDNEYS AND URETERS:  Bilateral atrophic kidneys.  No obstructing stones.  No hydronephrosis.



 STOMACH AND BOWEL:  Air-fluid level with colonic dilatation measuring up to 9.0 cm in diameter likel
y colonic ileus.  Clinical correlation is recommended.  No mucosal thickening.



PELVIS:



 APPENDIX:  No findings to suggest acute appendicitis.



 BLADDER:  Unremarkable.  No stones.



 REPRODUCTIVE:  Unremarkable as visualized.



ABDOMEN and PELVIS:



 INTRAPERITONEAL SPACE:  Unremarkable.  No free air.  No significant fluid collection.



 BONES/JOINTS:  Compression deformity of L1 and L2 vertebral bodies, unchanged.  No dislocation.



 SOFT TISSUES:  Unremarkable.



 VASCULATURE:  Unremarkable.  No abdominal aortic aneurysm.



 LYMPH NODES:  Unremarkable.  No enlarged lymph nodes.



 TUBES, LINES AND DEVICES:  Peritoneal dialysis catheter with the distal tip in the left hemipelvis.



 OTHER FINDINGS:  .  Beam.



IMPRESSION:     



1.  Air-fluid level with colonic dilatation measuring up to 9.0 cm in diameter likely colonic ileus. 
 Clinical correlation is recommended.



2.  Bilateral pleural effusions with compressive atelectasis.



Electronically Signed by: Armando Sahni at 03/12/2025 16:17:32 PM

## 2025-03-12 NOTE — DVHPN2
Progress Note - Dictate


Date Seen:  Mar 12, 2025


Medical Necessity Reason


Pt with a Central, PICC or Fol:  Yes


Subjective


Patient seen and examined at bedside.


Breathing comfortably on room air


Overnight events reviewed.


vital signs





                                   Vital Sign








  Date Time  Temp Pulse Resp B/P (MAP) Pulse Ox O2 Delivery O2 Flow Rate FiO2


 


3/12/25 20:45  72 14 110/53 (72) 100   


 


3/12/25 20:00 97.9       





 97.9       


 


3/12/25 20:00      Room Air* 0 21














                           Total Intake and Output   


 


 3/11/25 3/11/25 3/12/25





 15:00 23:00 07:00


 


Intake Total 992.721 ml 998.469 ml 918.727 ml


 


Output Total   0 ml


 


Balance 992.721 ml 998.469 ml 918.727 ml








medications





                               Current Medications








 Medications  Dose


 Ordered  Sig/Anthony


 Route  Start Time


 Stop Time Status Last Admin


Dose Admin


 


 Ondansetron HCl  4 mg  Q4HP  PRN


 IV  3/4/25 17:30


    3/11/25 05:46


4 MG


 


 Morphine Sulfate  2 mg  Q4HPRN  PRN


 IV  3/4/25 17:30


    3/11/25 05:47


2 MG


 


 Morphine Sulfate  2 mg  Q30M  PRN


 IV  3/4/25 17:30


     





 


 Acetaminophen/


 Hydrocodone Bitart  1 tab  Q4HP  PRN


 PO  3/4/25 17:30


    3/9/25 14:49


1 TAB


 


 Acetaminophen  650 mg  Q6HP  PRN


 PO  3/4/25 17:30


    3/10/25 09:57


650 MG


 


 Nitroglycerin  0.4 mg  Q5MINP  PRN


 SL  3/4/25 17:30


     





 


 Hydroxychloroquine


 Sulfate  200 mg  BID


 PO  3/4/25 22:00


    3/12/25 10:46


200 MG


 


 Levothyroxine


 Sodium  100 mcg  DAILY


 PO  3/5/25 10:00


    3/12/25 10:47


100 MCG


 


 Atorvastatin


 Calcium  10 mg  HS


 PO  3/4/25 22:00


    3/11/25 22:10


10 MG


 


 Docusate Sodium  100 mg  BIDP  PRN


 PO  3/4/25 21:00


     





 


 Sevelamer HCl  2,400 mg  TIDWM


 PO  3/5/25 08:00


    3/12/25 18:19


2,400 MG


 


 Budesonide  0.5 mg  BID


 NEB  3/4/25 22:00


    3/12/25 19:15


0.5 MG


 


 Midodrine  10 mg  TID@0600,1200,1800


 PO  3/5/25 06:00


    3/12/25 18:20


10 MG


 


 Levalbuterol HCl  0.625 mg  Q6HR


 NEB  3/6/25 06:00


    3/12/25 19:16


0.625 MG


 


 Phenylephrine HCl


 80 mg/Sodium


 Chloride  250 ml @ 


 7.5 mls/hr  Q24H


 IV  3/6/25 15:00


    3/12/25 09:03


19.688 MLS/HR


 


 Vancomycin HCl  0 ml @ 0


 mls/hr  UD


 IV  3/8/25 09:00


     





 


 Meropenem  50 ml @ 17


 mls/hr  DAILY


 IV  3/10/25 10:00


    3/12/25 10:47


17 MLS/HR


 


 Norepinephrine


 Bitartrate 32 mg/


 Sodium Chloride  250 ml @ 


 0.938 mls/


 hr  Q24H


 IV  3/9/25 13:45


    3/11/25 13:41


2.813 MLS/HR


 


 Epoetin Kiran-epbx  10,000 unit  MWF@2100


 SC  3/12/25 21:00


   Hold  





 


 Docusate Sodium  100 mg  DAILY


 PO  3/11/25 10:00


    3/12/25 10:47


100 MG


 


 Amino Acids  0 ml @ 0


 mls/hr  PER  PHARMACY


 IV  3/10/25 15:00


     





 


 Diagnostic Test


  (Pha)  1 strip  Q6HR


 VI  3/10/25 18:00


    3/12/25 18:21


1 STRIP


 


 Insulin Human


 Regular  FOLLOW


 SLIDING


 SCALE  Q6HR


 SC  3/10/25 18:00


     





 


 Dextrose  50 ml  UD


 IV  3/10/25 15:30


     





 


 Amino Acids  1,000 ml @ 


 41 mls/hr  DAILY@2200


 IV  3/10/25 22:00


    3/11/25 22:10


41 MLS/HR


 


 Metoclopramide HCl  5 mg  Q8HPRN  PRN


 IV  3/10/25 23:15


    3/11/25 09:39


5 MG


 


 Polyethylene


 Glycol  17 gm  BID


 PO  3/11/25 14:45


    3/12/25 10:46


17 GM


 


 Pantoprazole


 Sodium  40 mg  BID


 IV  3/11/25 22:00


    3/12/25 10:47


40 MG


 


 Epoetin Kiran-epbx  10,000 unit  ONCE@2100


 SC  3/12/25 21:00


     





 


 Bisacodyl  10 mg  Q12HR


 MO  3/12/25 22:00


     





 


 Lactulose  30 ml  Q4HR


 PO  3/12/25 14:00


    3/12/25 14:40


30 ML


 


 Amiodarone HCl  200 mg  Q12HR


 PO  3/12/25 22:00


     





 


 Guaifenesin/


 Dextromethorphan  10 ml  Q4HP  PRN


 PO  3/12/25 17:30


     











objective


Gen.: Patient lying in bed in no apparent distress. On room air


Head: Normocephalic, atraumatic.


Eyes: EOMI/PERRLA.


Ears: Normal hearing. Normal anatomy.


Neck/trachea: Trachea midline, supple.


Nose: Normal external anatomy.


Mouth: Moist mucous membranes.


Chest: Decreased air entry bilaterally. No wheezing or rhonchi.


Cardiovascular: Positive S1, positive S2. Regular rate and rhythm.


Abdomen: Positive bowel sounds in all 4 quadrants. Soft, non-tender, non-

distended.


: Deferred.


Rectal: Deferred.


Skin: Warm, dry. Intact.


Extremities: 2+ radial pulses bilaterally. No lower extremity edema.


Neuro: Awake, alert, oriented x3. No gross motor or sensory deficits. Cranial 

nerves II through XII intact. Gait not assessed.


laboratory and microbiology


                                Laboratory Tests


3/12/25 03:00

















Test


 3/12/25


03:00 Range/Units


 


 


Serum Glucose 99    mg/dL








Assessment/Plan


Impression:


Acute hypoxic respiratory failure


Shock


Atrial fibrillation w/ RVR


Lactic acidosis


End-stage renal disease, on peritoneal dialysis


Obesity





Events:


Remains on room air.


Supplemental oxygen PRN


Improved O2 requirements





Patient is constipated.


Bowel regimen





Plan for peritoneal dialysis





On pressors for hemodynamic support


Levophed 2 mcg/min and Iam-Synephrine 105 mcg/min


Titrate to keep mean arterial pressure greater than 65 mmHg.


Improved pressor requirements





AFib, rate controlled.





Continue antibiotics


Blood cultures show no growth after 72 hours





Hemoglobin trending up to 9.1 g/dL





Continue Protonix BID


Clinimix for nutritional support





Monitor hemoglobin


Transfuse if less than 7.0 g/dL.





Monitor renal function


Monitor electrolytes. Supplement as necessary.


Hyponatremia - sodium of 131


Nephrology recs appreciated





Labs and imaging reviewed.


Rest of plan as noted below.





Plan:


Supplemental oxygen PRN


Titrate to keep O2 sats above 92%.





Incentive spirometry





Continue antibiotics - meropenem


Follow up cultures





Amiodarone PO


Cardiology recs appreciated.





On pressors for hemodynamic support


Titrate to keep mean arterial pressure greater than 65 mmHg.





Monitor renal function.


PD per Nephrology


Monitor electrolytes. Supplement as necessary.


Monitor ins and outs.


Nephrology recommendations appreciated





Diet and lifestyle modifications for weight reduction


Obesity - complicates all care





DVT prophylaxis.





Prognosis: Poor given patient's multiple co-morbidities.


Condition: Critical





Rest of plan per hospitalist and other consultants.





A total of 35 minutes of critical care time was spent reviewing the patient 

record, examining the patient, making a diagnostic and therapeutic plan, 

discussing this plan with the medical personnel, following up on diagnostic 

studies and following the patient for clinical stability excluding any and all 

procedures.  At least 50% of this time was spent in direct, face-to-face 

contact.





Thank you, Dr. Platt, for allowing me to participate in this patient's care.


Further recommendations will depend on the patient's clinical course.


Please do not hesitate to contact me if you have any questions or concerns.





This medical document was created using an electronic medical record system with

Dragon computerized dictation system. Although these documentations are being 

carefully reviewed, there may still be some phonetic and typographical changes. 

The errors are purely typographical, due to imperfection on the software 

program, and do not reflect any compromise in the patient's medical care.





Dietary Evaluation Review


Comments:  


Encourage high protein diet. consider Nepro 240ml 19, 432 kcal. PO  


supplements BID


Expected Outcomes/Goals:  


maintain protein levels WNL


Plan discussed with:  Other (BALDO Simons)


Critical Care Time(min):  35











JOSEPH SUAREZ MD             Mar 12, 2025 21:01

## 2025-03-12 NOTE — DVHINCON2
DATE OF CONSULTATION: 03/12/2025



HISTORY OF PRESENT ILLNESS:  This patient is 82 years old, unable to give 

adequate history, but apparently she is constipated and had nausea and vomiting.

 She also had brown colored emesis and abdominal pain.  Currently, she is 

complaining of abdominal pain and has not had a bowel activity for the past 3 or

4 days ago.



PAST MEDICAL HISTORY: End-stage renal disease, hypertension.



PAST SURGICAL HISTORY:  Cholecystectomy, hysterectomy, tonsillectomy and a PD 

catheter placement.  She is on peritoneal dialysis.



PHYSICAL EXAMINATION:

VITAL SIGNS:  Afebrile, stable signs.

HEENT:  With no evidence of pallor, cyanosis, or jaundice.

NECK:  Supple, nontender with no thyromegaly, lymphadenopathy.

CHEST AND LUNGS:  Clear.

HEART:  Within normal limits.

ABDOMEN:  Soft, distended and it is tender in the upper abdomen.  No rebound.

EXTREMITIES:  Unremarkable.

NEUROLOGIC:  Not assessed.

RECTAL EXAMINATION:  Revealed fecal impaction.



PLAN:  Plan will be to do a bedside disimpaction and manage conservatively at 

this time.  No indication for urgent surgery.  Hydrate her also and at the same 

time do close observation and no resolution of the obstruction then she might 

need urgent surgery based upon ongoing evaluation.  She is high risk for 

surgery.





MD DENYS Grace/MAR



DD: 03/12/2025 05:44 PM

DT: 03/12/2025 05:54 PM

TID: [id number] RECEIPT: [id number]



cc:     Dylan Elder NP

## 2025-03-13 NOTE — DVHPN2
Progress Note - Dictate


Date Seen:  Mar 13, 2025


Medical Necessity Reason


Pt with a Central, PICC or Fol:  Yes


Subjective


No new acute complaints noted at this time. 


Patient is currently on 2 pressors. Levophed is decreasing slightly. 











vital signs





                                   Vital Sign








  Date Time  Temp Pulse Resp B/P (MAP) Pulse Ox O2 Delivery O2 Flow Rate FiO2


 


3/13/25 10:46  86 12 111/50 (70) 99   


 


3/13/25 10:00      Room Air* 0 21


 


3/13/25 08:00 97.7       





 97.7       














                           Total Intake and Output   


 


 3/12/25 3/12/25 3/13/25





 14:59 22:59 06:59


 


Intake Total 682.790 ml 1197.865 ml 636.812 ml


 


Output Total  0 ml 0 ml


 


Balance 682.790 ml 1197.865 ml 636.812 ml








medications





                               Current Medications








 Medications  Dose


 Ordered  Sig/Anthony


 Route  Start Time


 Stop Time Status Last Admin


Dose Admin


 


 Ondansetron HCl  4 mg  Q4HP  PRN


 IV  3/4/25 17:30


    3/11/25 05:46


4 MG


 


 Morphine Sulfate  2 mg  Q4HPRN  PRN


 IV  3/4/25 17:30


    3/12/25 23:43


2 MG


 


 Morphine Sulfate  2 mg  Q30M  PRN


 IV  3/4/25 17:30


     





 


 Acetaminophen/


 Hydrocodone Bitart  1 tab  Q4HP  PRN


 PO  3/4/25 17:30


    3/9/25 14:49


1 TAB


 


 Acetaminophen  650 mg  Q6HP  PRN


 PO  3/4/25 17:30


    3/10/25 09:57


650 MG


 


 Nitroglycerin  0.4 mg  Q5MINP  PRN


 SL  3/4/25 17:30


     





 


 Hydroxychloroquine


 Sulfate  200 mg  BID


 PO  3/4/25 22:00


    3/13/25 09:41


200 MG


 


 Levothyroxine


 Sodium  100 mcg  DAILY


 PO  3/5/25 10:00


    3/13/25 09:41


100 MCG


 


 Atorvastatin


 Calcium  10 mg  HS


 PO  3/4/25 22:00


    3/12/25 22:06


10 MG


 


 Docusate Sodium  100 mg  BIDP  PRN


 PO  3/4/25 21:00


     





 


 Budesonide  0.5 mg  BID


 NEB  3/4/25 22:00


    3/13/25 06:42


0.5 MG


 


 Midodrine  10 mg  TID@0600,1200,1800


 PO  3/5/25 06:00


    3/13/25 11:44


10 MG


 


 Levalbuterol HCl  0.625 mg  Q6HR


 NEB  3/6/25 06:00


    3/13/25 06:43


0.625 MG


 


 Phenylephrine HCl


 80 mg/Sodium


 Chloride  250 ml @ 


 7.5 mls/hr  Q24H


 IV  3/6/25 15:00


    3/13/25 05:50


30.938 MLS/HR


 


 Vancomycin HCl  0 ml @ 0


 mls/hr  UD


 IV  3/8/25 09:00


     





 


 Meropenem  50 ml @ 17


 mls/hr  DAILY


 IV  3/10/25 10:00


    3/13/25 09:41


17 MLS/HR


 


 Norepinephrine


 Bitartrate 32 mg/


 Sodium Chloride  250 ml @ 


 0.938 mls/


 hr  Q24H


 IV  3/9/25 13:45


    3/11/25 13:41


2.813 MLS/HR


 


 Epoetin Kiran-epbx  10,000 unit  MWF@2100


 SC  3/12/25 21:00


   Hold  





 


 Docusate Sodium  100 mg  DAILY


 PO  3/11/25 10:00


    3/13/25 09:41


100 MG


 


 Amino Acids  0 ml @ 0


 mls/hr  PER  PHARMACY


 IV  3/10/25 15:00


     





 


 Diagnostic Test


  (Pha)  1 strip  Q6HR


 VI  3/10/25 18:00


    3/13/25 11:44


1 STRIP


 


 Insulin Human


 Regular  FOLLOW


 SLIDING


 SCALE  Q6HR


 SC  3/10/25 18:00


     





 


 Dextrose  50 ml  UD


 IV  3/10/25 15:30


     





 


 Amino Acids  1,000 ml @ 


 41 mls/hr  DAILY@2200


 IV  3/10/25 22:00


    3/12/25 22:06


41 MLS/HR


 


 Metoclopramide HCl  5 mg  Q8HPRN  PRN


 IV  3/10/25 23:15


    3/11/25 09:39


5 MG


 


 Polyethylene


 Glycol  17 gm  BID


 PO  3/11/25 14:45


    3/12/25 22:06


17 GM


 


 Pantoprazole


 Sodium  40 mg  BID


 IV  3/11/25 22:00


    3/13/25 09:41


40 MG


 


 Epoetin Kiran-epbx  10,000 unit  ONCE@2100


 SC  3/12/25 21:00


    3/12/25 21:21


10,000 UNIT


 


 Bisacodyl  10 mg  Q12HR


 MN  3/12/25 22:00


    3/12/25 22:05


10 MG


 


 Lactulose  30 ml  Q4HR


 PO  3/12/25 14:00


    3/13/25 05:31


30 ML


 


 Amiodarone HCl  200 mg  Q12HR


 PO  3/12/25 22:00


    3/13/25 09:41


200 MG


 


 Guaifenesin/


 Dextromethorphan  10 ml  Q4HP  PRN


 PO  3/12/25 17:30


     





 


 Sevelamer HCl  800 mg  TIDWM


 PO  3/13/25 11:15


   UNV  











objective


General Appearance:  Alert, , mild distress


HEENT:  Atraumatic, PERRLA, EOMI


Respiratory:  Clear to auscultation, Normal air movement


Cardiovascular:  Normal S1, Normal S2, Other (afib)


Abdominal:  non tender. 


Extremities:  No clubbing, No cyanosis, Normal pulses ,edema +


Skin:  No rashes, No breakdown


Neuro:  grossly intact


laboratory and microbiology


                                Laboratory Tests


3/13/25 03:00

















Test


 3/13/25


03:00 Range/Units


 


 


Serum Glucose 81    mg/dL








Assessment/Plan


Patient is a 82-year-old female presents to the hospital with:





Hypotension/Shock


Lactic Acidosis 


Afib uncontrolled


Chest pain


Hyperkalemia


ESRD on PD


Chronic abdominal pain


Diarrhea 





Recommendations:


Patient is still on 2 pressors, however slightly decreasing Levophed use.








Continue Vancomycin/ Meropenem empirically


Consider A line for accurate BP due  to arm edema


Pulm/ crit care on board


follow blood culture


no clear source of infection


Recommend GI consult





hypotension is likely multifactorial cardiac due to Afib with rvr vs medication 

induced/ acidosis





s/p  Peritoneal fluid analysis: cell count not qualifying for peritonitis. 

cultures are also negative 








Antibiotic status:


Meropenem IV [Restarted on 03/10]


Vancomycin IV [Started on 03/05 - 03/08]





03/07, Vancomycin Random is 20.2. 





03/04, Abdomen Pelvis CT showed No acute intra-abdominal finding. Isodense 

lesion of the right mid kidney measuring 1.2 cm, attention on follow-up is 

recommended.  


Compression fracture of L2 and possibly superior endplate of L1 with mild 

posterior extension resulting in mild spinal canal stenosis, age-indeterminate  





03/04, Blood culture showed no growth





03/06, Fluid culture preliminary showed no growth





03/05, MRSA screening negative





critical care time 35 minutes which includes assessment, coordinating plan with 

nurse and consultants. 





prognosis poor





Thank you for consult.





Dietary Evaluation Review


Comments:  


Encourage high protein diet. consider Nepro 240ml 19, 432 kcal. PO  


supplements BID


Expected Outcomes/Goals:  


maintain protein levels WNL


Plan discussed with:  Other











GELA ELLISON MD            Mar 13, 2025 11:58

## 2025-03-13 NOTE — DVHPN2
Progress Note - Dictate


Date Seen:  Mar 13, 2025


Medical Necessity Reason


Pt with a Central, PICC or Fol:  Yes


Subjective


Patient seen and examined at bedside.


Breathing comfortably on room air


Overnight events reviewed.


vital signs





                                   Vital Sign








  Date Time  Temp Pulse Resp B/P (MAP) Pulse Ox O2 Delivery O2 Flow Rate FiO2


 


3/13/25 22:30  70 14 120/57 (78) 100   


 


3/13/25 20:00 97.6       





 97.6       


 


3/13/25 18:24      Room Air 0.0 


 


3/13/25 18:24        21














                           Total Intake and Output   


 


 3/12/25 3/12/25 3/13/25





 15:00 23:00 07:00


 


Intake Total 681.912 ml 1185.892 ml 641.500 ml


 


Output Total  0 ml 0 ml


 


Balance 681.912 ml 1185.892 ml 641.500 ml








medications





                               Current Medications








 Medications  Dose


 Ordered  Sig/Anthony


 Route  Start Time


 Stop Time Status Last Admin


Dose Admin


 


 Ondansetron HCl  4 mg  Q4HP  PRN


 IV  3/4/25 17:30


    3/11/25 05:46


4 MG


 


 Morphine Sulfate  2 mg  Q4HPRN  PRN


 IV  3/4/25 17:30


    3/12/25 23:43


2 MG


 


 Morphine Sulfate  2 mg  Q30M  PRN


 IV  3/4/25 17:30


     





 


 Acetaminophen/


 Hydrocodone Bitart  1 tab  Q4HP  PRN


 PO  3/4/25 17:30


    3/13/25 22:23


1 TAB


 


 Acetaminophen  650 mg  Q6HP  PRN


 PO  3/4/25 17:30


    3/10/25 09:57


650 MG


 


 Nitroglycerin  0.4 mg  Q5MINP  PRN


 SL  3/4/25 17:30


     





 


 Hydroxychloroquine


 Sulfate  200 mg  BID


 PO  3/4/25 22:00


    3/13/25 22:19


200 MG


 


 Levothyroxine


 Sodium  100 mcg  DAILY


 PO  3/5/25 10:00


    3/13/25 09:41


100 MCG


 


 Atorvastatin


 Calcium  10 mg  HS


 PO  3/4/25 22:00


    3/13/25 22:19


10 MG


 


 Docusate Sodium  100 mg  BIDP  PRN


 PO  3/4/25 21:00


     





 


 Budesonide  0.5 mg  BID


 NEB  3/4/25 22:00


    3/13/25 18:24


0.5 MG


 


 Midodrine  10 mg  TID@0600,1200,1800


 PO  3/5/25 06:00


    3/13/25 17:49


10 MG


 


 Levalbuterol HCl  0.625 mg  Q6HR


 NEB  3/6/25 06:00


    3/13/25 18:24


0.625 MG


 


 Phenylephrine HCl


 80 mg/Sodium


 Chloride  250 ml @ 


 7.5 mls/hr  Q24H


 IV  3/6/25 15:00


    3/13/25 16:29


26.25 MLS/HR


 


 Vancomycin HCl  0 ml @ 0


 mls/hr  UD


 IV  3/8/25 09:00


     





 


 Meropenem  50 ml @ 17


 mls/hr  DAILY


 IV  3/10/25 10:00


    3/13/25 09:41


17 MLS/HR


 


 Norepinephrine


 Bitartrate 32 mg/


 Sodium Chloride  250 ml @ 


 0.938 mls/


 hr  Q24H


 IV  3/9/25 13:45


    3/11/25 13:41


2.813 MLS/HR


 


 Epoetin Kiran-epbx  10,000 unit  MWF@2100


 SC  3/12/25 21:00


   Hold  





 


 Amino Acids  0 ml @ 0


 mls/hr  PER  PHARMACY


 IV  3/10/25 15:00


     





 


 Diagnostic Test


  (Pha)  1 strip  Q6HR


 VI  3/10/25 18:00


    3/13/25 17:49


1 STRIP


 


 Insulin Human


 Regular  FOLLOW


 SLIDING


 SCALE  Q6HR


 SC  3/10/25 18:00


     





 


 Dextrose  50 ml  UD


 IV  3/10/25 15:30


     





 


 Polyethylene


 Glycol  17 gm  BID


 PO  3/11/25 14:45


    3/13/25 22:21


17 GM


 


 Pantoprazole


 Sodium  40 mg  BID


 IV  3/11/25 22:00


    3/13/25 22:21


40 MG


 


 Epoetin Kiran-epbx  10,000 unit  ONCE@2100


 SC  3/12/25 21:00


    3/13/25 22:22


10,000 UNIT


 


 Amiodarone HCl  200 mg  Q12HR


 PO  3/12/25 22:00


    3/13/25 22:19


200 MG


 


 Guaifenesin/


 Dextromethorphan  10 ml  Q4HP  PRN


 PO  3/12/25 17:30


     





 


 Sevelamer HCl  800 mg  TIDWM


 PO  3/13/25 11:15


    3/13/25 17:49


800 MG


 


 Peritoneal


 Dialysis Solution  2,000 ml  0600,1000,1400,1800,2200


 IP  3/13/25 18:00


    3/13/25 22:09


2,000 ML


 


 Lactulose  30 ml  Q12H


 PO  3/14/25 02:00


     





 


 Amino Acids/


 Electrolytes/


 Dextrose  2,000 ml @ 


 41 mls/hr  DAILY@2200


 IV  3/13/25 22:00


    3/13/25 22:21


41 MLS/HR








objective


Gen.: Patient lying in bed in no apparent distress. On room air


Head: Normocephalic, atraumatic.


Eyes: EOMI/PERRLA.


Ears: Normal hearing. Normal anatomy.


Neck/trachea: Trachea midline, supple.


Nose: Normal external anatomy.


Mouth: Moist mucous membranes.


Chest: Decreased air entry bilaterally. No wheezing or rhonchi.


Cardiovascular: Positive S1, positive S2. Regular rate and rhythm.


Abdomen: Positive bowel sounds in all 4 quadrants. Soft, non-tender, non-

distended.


: Deferred.


Rectal: Deferred.


Skin: Warm, dry. Intact.


Extremities: 2+ radial pulses bilaterally. No lower extremity edema.


Neuro: Awake, alert, oriented x3. No gross motor or sensory deficits. Cranial 

nerves II through XII intact. Gait not assessed.


laboratory and microbiology


                                Laboratory Tests


3/13/25 03:00

















Test


 3/13/25


03:00 Range/Units


 


 


Serum Glucose 81    mg/dL








Assessment/Plan


Impression:


Acute hypoxic respiratory failure


Shock


Atrial fibrillation w/ RVR


Lactic acidosis


End-stage renal disease, on peritoneal dialysis


Obesity





Events:


Remains on room air.


Supplemental oxygen PRN


Improved O2 requirements





Bowel regimen for constipation





Plan for peritoneal dialysis





On pressors for hemodynamic support


On Iam-Synephrine 90 mcg/min


Titrate to keep mean arterial pressure greater than 65 mmHg.


Improved pressor requirements





AFib, rate controlled.





Continue antibiotics


Blood cultures show no growth after 5 days





Hemoglobin trending up to 9.3 g/dL





Continue Protonix BID


Clinimix for nutritional support





Monitor hemoglobin


Transfuse if less than 7.0 g/dL.





Monitor renal function


Monitor electrolytes. Supplement as necessary.


Hyponatremia - sodium trending down to 125


Nephrology recs appreciated





NPO


Plan for CT abdomen and pelvis to rule out colonic ileus.


 


Labs and imaging reviewed.


Rest of plan as noted below.





Plan:


Supplemental oxygen PRN


Titrate to keep O2 sats above 92%.





Incentive spirometry





Continue antibiotics - meropenem


Follow up cultures





Amiodarone PO


Cardiology recs appreciated.





On pressors for hemodynamic support


Titrate to keep mean arterial pressure greater than 65 mmHg.





Monitor renal function.


PD per Nephrology


Monitor electrolytes. Supplement as necessary.


Monitor ins and outs.


Nephrology recommendations appreciated





Diet and lifestyle modifications for weight reduction


Obesity - complicates all care





DVT prophylaxis.





Prognosis: Poor given patient's multiple co-morbidities.


Condition: Critical





Rest of plan per hospitalist and other consultants.





A total of 35 minutes of critical care time was spent reviewing the patient 

record, examining the patient, making a diagnostic and therapeutic plan, 

discussing this plan with the medical personnel, following up on diagnostic 

studies and following the patient for clinical stability excluding any and all 

procedures.  At least 50% of this time was spent in direct, face-to-face 

contact.





Thank you, Dr. Platt, for allowing me to participate in this patient's care.


Further recommendations will depend on the patient's clinical course.


Please do not hesitate to contact me if you have any questions or concerns.





This medical document was created using an electronic medical record system with

Dragon computerized dictation system. Although these documentations are being 

carefully reviewed, there may still be some phonetic and typographical changes. 

The errors are purely typographical, due to imperfection on the software 

program, and do not reflect any compromise in the patient's medical care.





Dietary Evaluation Review


Comments:  


Encourage high protein diet. consider Nepro 240ml 19, 432 kcal. PO  


supplements BID


Expected Outcomes/Goals:  


maintain protein levels WNL


Plan discussed with:  Other (BALDO Longoria)


Critical Care Time(min):  35











JOSEPH SUAREZ MD             Mar 13, 2025 23:24

## 2025-03-13 NOTE — DVHPN2
Progress Note


Date Seen:  Mar 13, 2025


Medical Necessity Reason


Pt with a Central, PICC or Fol:  Yes





Subjective


Review of Systems:  GI:Abnormal





Objective


vital signs





                                   Vital Sign








  Date Time  Temp Pulse Resp B/P (MAP) Pulse Ox O2 Delivery O2 Flow Rate FiO2


 


3/13/25 10:46  86 12 111/50 (70) 99   


 


3/13/25 10:00      Room Air* 0 21


 


3/13/25 08:00 97.7       





 97.7       














                           Total Intake and Output   


 


 3/12/25 3/12/25 3/13/25





 15:00 23:00 07:00


 


Intake Total 681.912 ml 1185.892 ml 641.500 ml


 


Output Total  0 ml 0 ml


 


Balance 681.912 ml 1185.892 ml 641.500 ml








medications





                               Current Medications








 Medications  Dose


 Ordered  Sig/Anthony


 Route  Start Time


 Stop Time Status Last Admin


Dose Admin


 


 Ondansetron HCl  4 mg  Q4HP  PRN


 IV  3/4/25 17:30


    3/11/25 05:46


4 MG


 


 Morphine Sulfate  2 mg  Q4HPRN  PRN


 IV  3/4/25 17:30


    3/12/25 23:43


2 MG


 


 Morphine Sulfate  2 mg  Q30M  PRN


 IV  3/4/25 17:30


     





 


 Acetaminophen/


 Hydrocodone Bitart  1 tab  Q4HP  PRN


 PO  3/4/25 17:30


    3/9/25 14:49


1 TAB


 


 Acetaminophen  650 mg  Q6HP  PRN


 PO  3/4/25 17:30


    3/10/25 09:57


650 MG


 


 Nitroglycerin  0.4 mg  Q5MINP  PRN


 SL  3/4/25 17:30


     





 


 Hydroxychloroquine


 Sulfate  200 mg  BID


 PO  3/4/25 22:00


    3/13/25 09:41


200 MG


 


 Levothyroxine


 Sodium  100 mcg  DAILY


 PO  3/5/25 10:00


    3/13/25 09:41


100 MCG


 


 Atorvastatin


 Calcium  10 mg  HS


 PO  3/4/25 22:00


    3/12/25 22:06


10 MG


 


 Docusate Sodium  100 mg  BIDP  PRN


 PO  3/4/25 21:00


     





 


 Sevelamer HCl  2,400 mg  TIDWM


 PO  3/5/25 08:00


    3/13/25 08:13


2,400 MG


 


 Budesonide  0.5 mg  BID


 NEB  3/4/25 22:00


    3/13/25 06:42


0.5 MG


 


 Midodrine  10 mg  TID@0600,1200,1800


 PO  3/5/25 06:00


    3/13/25 05:31


10 MG


 


 Levalbuterol HCl  0.625 mg  Q6HR


 NEB  3/6/25 06:00


    3/13/25 06:43


0.625 MG


 


 Phenylephrine HCl


 80 mg/Sodium


 Chloride  250 ml @ 


 7.5 mls/hr  Q24H


 IV  3/6/25 15:00


    3/13/25 05:50


30.938 MLS/HR


 


 Vancomycin HCl  0 ml @ 0


 mls/hr  UD


 IV  3/8/25 09:00


     





 


 Meropenem  50 ml @ 17


 mls/hr  DAILY


 IV  3/10/25 10:00


    3/13/25 09:41


17 MLS/HR


 


 Norepinephrine


 Bitartrate 32 mg/


 Sodium Chloride  250 ml @ 


 0.938 mls/


 hr  Q24H


 IV  3/9/25 13:45


    3/11/25 13:41


2.813 MLS/HR


 


 Epoetin Kiran-epbx  10,000 unit  MWF@2100


 SC  3/12/25 21:00


   Hold  





 


 Docusate Sodium  100 mg  DAILY


 PO  3/11/25 10:00


    3/13/25 09:41


100 MG


 


 Amino Acids  0 ml @ 0


 mls/hr  PER  PHARMACY


 IV  3/10/25 15:00


     





 


 Diagnostic Test


  (Pha)  1 strip  Q6HR


 VI  3/10/25 18:00


    3/13/25 05:32


1 STRIP


 


 Insulin Human


 Regular  FOLLOW


 SLIDING


 SCALE  Q6HR


 SC  3/10/25 18:00


     





 


 Dextrose  50 ml  UD


 IV  3/10/25 15:30


     





 


 Amino Acids  1,000 ml @ 


 41 mls/hr  DAILY@2200


 IV  3/10/25 22:00


    3/12/25 22:06


41 MLS/HR


 


 Metoclopramide HCl  5 mg  Q8HPRN  PRN


 IV  3/10/25 23:15


    3/11/25 09:39


5 MG


 


 Polyethylene


 Glycol  17 gm  BID


 PO  3/11/25 14:45


    3/12/25 22:06


17 GM


 


 Pantoprazole


 Sodium  40 mg  BID


 IV  3/11/25 22:00


    3/13/25 09:41


40 MG


 


 Epoetin Kiran-epbx  10,000 unit  ONCE@2100


 SC  3/12/25 21:00


    3/12/25 21:21


10,000 UNIT


 


 Bisacodyl  10 mg  Q12HR


 NC  3/12/25 22:00


    3/12/25 22:05


10 MG


 


 Lactulose  30 ml  Q4HR


 PO  3/12/25 14:00


    3/13/25 05:31


30 ML


 


 Amiodarone HCl  200 mg  Q12HR


 PO  3/12/25 22:00


    3/13/25 09:41


200 MG


 


 Guaifenesin/


 Dextromethorphan  10 ml  Q4HP  PRN


 PO  3/12/25 17:30


     











Examination:  LUNGS:Normal, LUNGS:Abnormal (Patient seen and), ABDOMEN:Abnormal,

MSK:Normal


laboratory and microbiology


                                Laboratory Tests


3/13/25 03:00

















Test


 3/13/25


03:00 Range/Units


 


 


Serum Glucose 81    mg/dL








                                  Microbiology








 Date/Time


Source Procedure


Growth Status





 


 3/8/25 16:30


Blood Blood Culture - Preliminary


NO GROWTH AFTER 72 HOURS OF INCUBATION. Resulted





 3/6/25 05:30


Peritoneal Fluid Gram Stain - Final


 Complete


 


 3/6/25 05:30


Peritoneal Fluid Body Fluid Culture - Final


 Complete


 


 3/5/25 23:00


Nose MRSA Screen - Final


 Complete











Problem List/Assessment/Plan


Problem List/Assessment/Plan


ESRD on peritoneal dialysis


Stool impaction


Abdominal pain


septic Shock


AFib with RVR


Hyperkalemia, resolved


Lactic acidosis


Dropping hemoglobin


GI bleeding





Recommendations


Status post manual disimpaction by Dr. Leblanc


Resume peritoneal dialysis @ 5 exchanges per 24 hrs


Use 2.5% Dianeal solution 2 L


Strict I&Os


Laxative


PD fluid white blood cell count is normal


Packed red blood transfusion p.r.n.


IV antibiotic


IV pressors for blood pressure support


Midodrine 10 mg p.o. t.i.d.


GI consult


Cardiology consult


Surgery consult on board


We will continue to follow











I discussed my plan of care with the patient, her daughter and the primary nurse

at the bedside


Plan discussed with:  Patient





My Orders


My Orders





                        Orders - FAIZA MORALES MD








Procedure Category Date Status





  Time 


 


Bisacodyl Suppository PHA 3/12/25 In Process





 (Dulcolax Supposit  22:00 


 


Lactulose Oral PHA 3/12/25 In Process





  14:00 


 


Ct Ab Pel Wo Con-No CT 3/12/25 Resulted





Oral Or Iv  12:46 


 


Body Fluids, Diff. LAB 3/12/25 Logged





Cell Count  13:15 


 


Body Fluid Ph LAB 3/12/25 Logged





  13:15 


 


* Surgical Consult CONS 3/12/25 Transmitted





   


 


Sevelamer (Renagel) PHA 3/13/25 Verified





  11:15 











Dietary Evaluation Review


Comments:  


Encourage high protein diet. consider Nepro 240ml 19, 432 kcal. PO  


supplements BID


Expected Outcomes/Goals:  


maintain protein levels WNL











FAIZA MORALES MD        Mar 13, 2025 11:05

## 2025-03-13 NOTE — DVHPN2
Progress Note


Date Seen:  Mar 13, 2025


Resident Creating Document:  SIMONA OCASIO RESIDENT


Medical Necessity Reason


Pt with a Central, PICC or Fol:  Yes





Subjective


Review of Systems


82-year-old Pashto-speaking female, ER staff translating, with PMH of ESRD, HLD

and HTN presented to ER with chest pain. Patient complains of nausea and 

vomiting for one day, mostly having brown-colored emesis per RN.  Patient has 

abdominal pain, periumbilical area. Last BM five days ago, usually patient has 

bowel movements every 2-3 days.  No melena or red blood in stool. Patient has 

had multiple colonoscopy in the past, last colonoscopy more than five years ago,

unsure of results. Patient has history of GERD.  No history of PUD.  No EGD in 

past.


Patient's abdomen is distended, patient is on peritoneal dialysis, patient got 1

unit of PRBC, today hemoglobin 9.1.  Patient is feeling nauseated but did not 

vomit, patient is getting Reglan.


Currently breathing on room air.





Objective


vital signs





                                   Vital Sign








  Date Time  Temp Pulse Resp B/P (MAP) Pulse Ox O2 Delivery O2 Flow Rate FiO2


 


3/13/25 10:46  86 12 111/50 (70) 99   


 


3/13/25 10:00      Room Air* 0 21


 


3/13/25 08:00 97.7       





 97.7       














                           Total Intake and Output   


 


 3/12/25 3/12/25 3/13/25





 14:59 22:59 06:59


 


Intake Total 682.790 ml 1197.865 ml 636.812 ml


 


Output Total  0 ml 0 ml


 


Balance 682.790 ml 1197.865 ml 636.812 ml








medications





                               Current Medications








 Medications  Dose


 Ordered  Sig/Anthony


 Route  Start Time


 Stop Time Status Last Admin


Dose Admin


 


 Ondansetron HCl  4 mg  Q4HP  PRN


 IV  3/4/25 17:30


    3/11/25 05:46


4 MG


 


 Morphine Sulfate  2 mg  Q4HPRN  PRN


 IV  3/4/25 17:30


    3/12/25 23:43


2 MG


 


 Morphine Sulfate  2 mg  Q30M  PRN


 IV  3/4/25 17:30


     





 


 Acetaminophen/


 Hydrocodone Bitart  1 tab  Q4HP  PRN


 PO  3/4/25 17:30


    3/9/25 14:49


1 TAB


 


 Acetaminophen  650 mg  Q6HP  PRN


 PO  3/4/25 17:30


    3/10/25 09:57


650 MG


 


 Nitroglycerin  0.4 mg  Q5MINP  PRN


 SL  3/4/25 17:30


     





 


 Hydroxychloroquine


 Sulfate  200 mg  BID


 PO  3/4/25 22:00


    3/13/25 09:41


200 MG


 


 Levothyroxine


 Sodium  100 mcg  DAILY


 PO  3/5/25 10:00


    3/13/25 09:41


100 MCG


 


 Atorvastatin


 Calcium  10 mg  HS


 PO  3/4/25 22:00


    3/12/25 22:06


10 MG


 


 Docusate Sodium  100 mg  BIDP  PRN


 PO  3/4/25 21:00


     





 


 Budesonide  0.5 mg  BID


 NEB  3/4/25 22:00


    3/13/25 06:42


0.5 MG


 


 Midodrine  10 mg  TID@0600,1200,1800


 PO  3/5/25 06:00


    3/13/25 11:44


10 MG


 


 Levalbuterol HCl  0.625 mg  Q6HR


 NEB  3/6/25 06:00


    3/13/25 06:43


0.625 MG


 


 Phenylephrine HCl


 80 mg/Sodium


 Chloride  250 ml @ 


 7.5 mls/hr  Q24H


 IV  3/6/25 15:00


    3/13/25 05:50


30.938 MLS/HR


 


 Vancomycin HCl  0 ml @ 0


 mls/hr  UD


 IV  3/8/25 09:00


     





 


 Meropenem  50 ml @ 17


 mls/hr  DAILY


 IV  3/10/25 10:00


    3/13/25 09:41


17 MLS/HR


 


 Norepinephrine


 Bitartrate 32 mg/


 Sodium Chloride  250 ml @ 


 0.938 mls/


 hr  Q24H


 IV  3/9/25 13:45


    3/11/25 13:41


2.813 MLS/HR


 


 Epoetin Kiran-epbx  10,000 unit  MWF@2100


 SC  3/12/25 21:00


   Hold  





 


 Docusate Sodium  100 mg  DAILY


 PO  3/11/25 10:00


    3/13/25 09:41


100 MG


 


 Amino Acids  0 ml @ 0


 mls/hr  PER  PHARMACY


 IV  3/10/25 15:00


     





 


 Diagnostic Test


  (Pha)  1 strip  Q6HR


 VI  3/10/25 18:00


    3/13/25 11:44


1 STRIP


 


 Insulin Human


 Regular  FOLLOW


 SLIDING


 SCALE  Q6HR


 SC  3/10/25 18:00


     





 


 Dextrose  50 ml  UD


 IV  3/10/25 15:30


     





 


 Amino Acids  1,000 ml @ 


 41 mls/hr  DAILY@2200


 IV  3/10/25 22:00


    3/12/25 22:06


41 MLS/HR


 


 Metoclopramide HCl  5 mg  Q8HPRN  PRN


 IV  3/10/25 23:15


    3/11/25 09:39


5 MG


 


 Polyethylene


 Glycol  17 gm  BID


 PO  3/11/25 14:45


    3/12/25 22:06


17 GM


 


 Pantoprazole


 Sodium  40 mg  BID


 IV  3/11/25 22:00


    3/13/25 09:41


40 MG


 


 Epoetin Kiran-epbx  10,000 unit  ONCE@2100


 SC  3/12/25 21:00


    3/12/25 21:21


10,000 UNIT


 


 Bisacodyl  10 mg  Q12HR


 FL  3/12/25 22:00


    3/12/25 22:05


10 MG


 


 Lactulose  30 ml  Q4HR


 PO  3/12/25 14:00


    3/13/25 05:31


30 ML


 


 Amiodarone HCl  200 mg  Q12HR


 PO  3/12/25 22:00


    3/13/25 09:41


200 MG


 


 Guaifenesin/


 Dextromethorphan  10 ml  Q4HP  PRN


 PO  3/12/25 17:30


     





 


 Sevelamer HCl  800 mg  TIDWM


 PO  3/13/25 11:15


   UNV  











Examination


GENERAL:  Not in acute distress.  


HEENT: EOMI,  Moist mucous membranes.  No scleral icterus.  No cervical 

lymphadenopathy.


LUNGS: Clear to auscultation bilaterally.  No accessory muscle use.


CARDIOVASCULAR: Regular rate and rhythm.  No murmur.  No JVD.


ABDOMEN: Mild-to-moderate periumbilical tenderness to palpation, +BS, ABD is 

distended


EXTREMITIES: No edema.  Nontender.


SKIN: No rashes or lesions.  Warm.


NEUROLOGIC:  Alert and oriented X3


laboratory and microbiology


                                Laboratory Tests


3/13/25 03:00

















Test


 3/13/25


03:00 Range/Units


 


 


Serum Glucose 81    mg/dL








                                  Microbiology








 Date/Time


Source Procedure


Growth Status





 


 3/8/25 16:30


Blood Blood Culture - Preliminary


NO GROWTH AFTER 72 HOURS OF INCUBATION. Resulted





 3/6/25 05:30


Peritoneal Fluid Gram Stain - Final


 Complete


 


 3/6/25 05:30


Peritoneal Fluid Body Fluid Culture - Final


 Complete


 


 3/5/25 23:00


Nose MRSA Screen - Final


 Complete











Problem List/Assessment/Plan


Problem List/Assessment/Plan


# Intractable nausea vomiting


# Abdominal pain


# GI bleed


# Anemia


# ESRD on peritoneal dialysis


# AFib on blood thinners


# Lactic acidosis





- SOBT pending


- patient got one unit PRBC, hemoglobin is stable now.  today hemoglobin is 9.1


- Monitor H&H, transfuse if hemoglobin less than 7.0


- Colace b.i.d., consider use of lactulose if no bowel movements


- Plan for EGD when  patient is stable


- Continue Protonix 40 mg  b.i.d.


- Cont. MiraLax


- bedside fecal disimpaction done


- surgery is on board to rule out obstruction


Thank you so much for the opportunity to consult on your patient. GI team will 

follow the patient.  In case of any questions or concerns please feel free to 

reach out.





Case discussed with Dr. JERONIMO Leblanc.  The patient and caregiver team agreed to the

plan.


Plan discussed with:  Patient





Dietary Evaluation Review


Comments:  


Encourage high protein diet. consider Nepro 240ml 19, 432 kcal. PO  


supplements BID


Expected Outcomes/Goals:  


maintain protein levels SIMONA JOHNSON RESIDENT        Mar 13, 2025 12:19

## 2025-03-13 NOTE — DVHPN2
Progress Note - Dictate


Date Seen:  Mar 13, 2025


Medical Necessity Reason


Pt with a Central, PICC or Fol:  Yes


Subjective


Patient is seen and evaluated in the ICU discussed with the nurse at bedside.  

CT of the abdomen and pelvis done yesterday does not show any acute pathology 

except for ileus.  However patient did have two bowel movements today per nurse.

 Still on pressor support.


vital signs





                                   Vital Sign








  Date Time  Temp Pulse Resp B/P (MAP) Pulse Ox O2 Delivery O2 Flow Rate FiO2


 


3/13/25 16:00 97.9 56 18 109/48 (68) 100   





 97.9       


 


3/13/25 16:00      Room Air* 0 21














                           Total Intake and Output   


 


 3/12/25 3/12/25 3/13/25





 15:00 23:00 07:00


 


Intake Total 681.912 ml 1185.892 ml 641.500 ml


 


Output Total  0 ml 0 ml


 


Balance 681.912 ml 1185.892 ml 641.500 ml








medications





                               Current Medications








 Medications  Dose


 Ordered  Sig/Anthony


 Route  Start Time


 Stop Time Status Last Admin


Dose Admin


 


 Ondansetron HCl  4 mg  Q4HP  PRN


 IV  3/4/25 17:30


    3/11/25 05:46


4 MG


 


 Morphine Sulfate  2 mg  Q4HPRN  PRN


 IV  3/4/25 17:30


    3/12/25 23:43


2 MG


 


 Morphine Sulfate  2 mg  Q30M  PRN


 IV  3/4/25 17:30


     





 


 Acetaminophen/


 Hydrocodone Bitart  1 tab  Q4HP  PRN


 PO  3/4/25 17:30


    3/9/25 14:49


1 TAB


 


 Acetaminophen  650 mg  Q6HP  PRN


 PO  3/4/25 17:30


    3/10/25 09:57


650 MG


 


 Nitroglycerin  0.4 mg  Q5MINP  PRN


 SL  3/4/25 17:30


     





 


 Hydroxychloroquine


 Sulfate  200 mg  BID


 PO  3/4/25 22:00


    3/13/25 09:41


200 MG


 


 Levothyroxine


 Sodium  100 mcg  DAILY


 PO  3/5/25 10:00


    3/13/25 09:41


100 MCG


 


 Atorvastatin


 Calcium  10 mg  HS


 PO  3/4/25 22:00


    3/12/25 22:06


10 MG


 


 Docusate Sodium  100 mg  BIDP  PRN


 PO  3/4/25 21:00


     





 


 Budesonide  0.5 mg  BID


 NEB  3/4/25 22:00


    3/13/25 06:42


0.5 MG


 


 Midodrine  10 mg  TID@0600,1200,1800


 PO  3/5/25 06:00


    3/13/25 11:44


10 MG


 


 Levalbuterol HCl  0.625 mg  Q6HR


 NEB  3/6/25 06:00


    3/13/25 13:38


0.625 MG


 


 Phenylephrine HCl


 80 mg/Sodium


 Chloride  250 ml @ 


 7.5 mls/hr  Q24H


 IV  3/6/25 15:00


    3/13/25 16:29


26.25 MLS/HR


 


 Vancomycin HCl  0 ml @ 0


 mls/hr  UD


 IV  3/8/25 09:00


     





 


 Meropenem  50 ml @ 17


 mls/hr  DAILY


 IV  3/10/25 10:00


    3/13/25 09:41


17 MLS/HR


 


 Norepinephrine


 Bitartrate 32 mg/


 Sodium Chloride  250 ml @ 


 0.938 mls/


 hr  Q24H


 IV  3/9/25 13:45


    3/11/25 13:41


2.813 MLS/HR


 


 Epoetin Kiran-epbx  10,000 unit  MWF@2100


 SC  3/12/25 21:00


   Hold  





 


 Docusate Sodium  100 mg  DAILY


 PO  3/11/25 10:00


    3/13/25 09:41


100 MG


 


 Amino Acids  0 ml @ 0


 mls/hr  PER  PHARMACY


 IV  3/10/25 15:00


     





 


 Diagnostic Test


  (Pha)  1 strip  Q6HR


 VI  3/10/25 18:00


    3/13/25 11:44


1 STRIP


 


 Insulin Human


 Regular  FOLLOW


 SLIDING


 SCALE  Q6HR


 SC  3/10/25 18:00


     





 


 Dextrose  50 ml  UD


 IV  3/10/25 15:30


     





 


 Amino Acids  1,000 ml @ 


 41 mls/hr  DAILY@2200


 IV  3/10/25 22:00


    3/12/25 22:06


41 MLS/HR


 


 Metoclopramide HCl  5 mg  Q8HPRN  PRN


 IV  3/10/25 23:15


    3/11/25 09:39


5 MG


 


 Polyethylene


 Glycol  17 gm  BID


 PO  3/11/25 14:45


    3/12/25 22:06


17 GM


 


 Pantoprazole


 Sodium  40 mg  BID


 IV  3/11/25 22:00


    3/13/25 09:41


40 MG


 


 Epoetin Kiran-epbx  10,000 unit  ONCE@2100


 SC  3/12/25 21:00


    3/12/25 21:21


10,000 UNIT


 


 Bisacodyl  10 mg  Q12HR


 IL  3/12/25 22:00


    3/12/25 22:05


10 MG


 


 Lactulose  30 ml  Q4HR


 PO  3/12/25 14:00


    3/13/25 05:31


30 ML


 


 Amiodarone HCl  200 mg  Q12HR


 PO  3/12/25 22:00


    3/13/25 09:41


200 MG


 


 Guaifenesin/


 Dextromethorphan  10 ml  Q4HP  PRN


 PO  3/12/25 17:30


     





 


 Sevelamer HCl  800 mg  TIDWM


 PO  3/13/25 11:15


     





 


 Peritoneal


 Dialysis Solution  2,000 ml  DAILY


 IP  3/14/25 10:00


     











objective


Elderly female in bed without distress.  Heart tachycardia with a S1 plus S2.  

Lungs fair air movement without any audible rales.  Abdomen soft obese positive 

bowel sounds.  Extremities trace edema around the ankles.


laboratory and microbiology


                                Laboratory Tests


3/13/25 03:00

















Test


 3/13/25


03:00 Range/Units


 


 


Serum Glucose 81    mg/dL








Assessment/Plan


We will discontinue repeat CT of the abdomen and pelvis with contrast given 

patient already had bowel movements and had a CT scan yesterday without 

contrast.  Start her on pureed diet.  Continue to titrate the pressors as she 

tolerates.  Continue current antibiotics and rest of supportive care and 

treatment as she is on.  Peritoneal dialysis per Nephrology recommendations.  

Further clinical management per clinical course.  Discussed with the nurse at 

bedside regarding care plan.


Problems(with codes):  


(1) Chronic kidney disease


(2) History of peritoneal dialysis


(3) Hypothyroidism


(4) Abdominal pain


(5) ESRD (end stage renal disease) on dialysis


(6) Acute abdominal pain


(7) Hypotension





Dietary Evaluation Review


Comments:  


Encourage high protein diet. consider Nepro 240ml 19, 432 kcal. PO  


supplements BID


Expected Outcomes/Goals:  


maintain protein levels WNL


Plan discussed with:  Patient, Other











BEATRIS CARY MD      Mar 13, 2025 16:43

## 2025-03-14 NOTE — DVHPN2
Progress Note - Dictate


Date Seen:  Mar 14, 2025


Medical Necessity Reason


Pt with a Central, PICC or Fol:  Yes


Subjective


Patient seen and examined at bedside.


Breathing comfortably on room air


Overnight events reviewed.


vital signs





                                   Vital Sign








  Date Time  Temp Pulse Resp B/P (MAP) Pulse Ox O2 Delivery O2 Flow Rate FiO2


 


3/14/25 22:16     95 Room Air 0.0 


 


3/14/25 22:16        21


 


3/14/25 19:15  92 13 113/49 (70)    


 


3/14/25 16:00 97.0       





 97.0       














                           Total Intake and Output   


 


 3/13/25 3/13/25 3/14/25





 15:00 23:00 07:00


 


Intake Total 527.063 ml 4558.066 ml 2420.369 ml


 


Output Total   1 ml


 


Balance 527.063 ml 4558.066 ml 2419.369 ml








medications





                               Current Medications








 Medications  Dose


 Ordered  Sig/Anthony


 Route  Start Time


 Stop Time Status Last Admin


Dose Admin


 


 Ondansetron HCl  4 mg  Q4HP  PRN


 IV  3/4/25 17:30


    3/14/25 14:58


4 MG


 


 Morphine Sulfate  2 mg  Q4HPRN  PRN


 IV  3/4/25 17:30


    3/12/25 23:43


2 MG


 


 Morphine Sulfate  2 mg  Q30M  PRN


 IV  3/4/25 17:30


     





 


 Acetaminophen/


 Hydrocodone Bitart  1 tab  Q4HP  PRN


 PO  3/4/25 17:30


    3/13/25 22:23


1 TAB


 


 Acetaminophen  650 mg  Q6HP  PRN


 PO  3/4/25 17:30


    3/10/25 09:57


650 MG


 


 Nitroglycerin  0.4 mg  Q5MINP  PRN


 SL  3/4/25 17:30


     





 


 Hydroxychloroquine


 Sulfate  200 mg  BID


 PO  3/4/25 22:00


    3/14/25 22:05


200 MG


 


 Levothyroxine


 Sodium  100 mcg  DAILY


 PO  3/5/25 10:00


    3/14/25 11:56


100 MCG


 


 Atorvastatin


 Calcium  10 mg  HS


 PO  3/4/25 22:00


    3/14/25 22:05


10 MG


 


 Docusate Sodium  100 mg  BIDP  PRN


 PO  3/4/25 21:00


     





 


 Budesonide  0.5 mg  BID


 NEB  3/4/25 22:00


    3/14/25 18:37


0.5 MG


 


 Midodrine  10 mg  TID@0600,1200,1800


 PO  3/5/25 06:00


    3/14/25 18:02


10 MG


 


 Levalbuterol HCl  0.625 mg  Q6HR


 NEB  3/6/25 06:00


    3/14/25 18:38


0.625 MG


 


 Phenylephrine HCl


 80 mg/Sodium


 Chloride  250 ml @ 


 7.5 mls/hr  Q24H


 IV  3/6/25 15:00


    3/14/25 17:29


33.75 MLS/HR


 


 Vancomycin HCl  0 ml @ 0


 mls/hr  UD


 IV  3/8/25 09:00


     





 


 Meropenem  50 ml @ 17


 mls/hr  DAILY


 IV  3/10/25 10:00


    3/14/25 11:56


17 MLS/HR


 


 Norepinephrine


 Bitartrate 32 mg/


 Sodium Chloride  250 ml @ 


 0.938 mls/


 hr  Q24H


 IV  3/9/25 13:45


    3/14/25 09:27


0.938 MLS/HR


 


 Epoetin Kiran-epbx  10,000 unit  MWF@2100


 SC  3/12/25 21:00


    3/14/25 22:05


10,000 UNIT


 


 Amino Acids  0 ml @ 0


 mls/hr  PER  PHARMACY


 IV  3/10/25 15:00


     





 


 Diagnostic Test


  (Pha)  1 strip  Q6HR


 VI  3/10/25 18:00


    3/14/25 17:57


1 STRIP


 


 Insulin Human


 Regular  FOLLOW


 SLIDING


 SCALE  Q6HR


 SC  3/10/25 18:00


    3/14/25 12:08


2 UNITS


 


 Dextrose  50 ml  UD


 IV  3/10/25 15:30


     





 


 Polyethylene


 Glycol  17 gm  BID


 PO  3/11/25 14:45


    3/14/25 22:06


17 GM


 


 Pantoprazole


 Sodium  40 mg  BID


 IV  3/11/25 22:00


    3/14/25 22:07


40 MG


 


 Epoetin Kiran-epbx  10,000 unit  ONCE@2100


 SC  3/12/25 21:00


    3/13/25 22:22


10,000 UNIT


 


 Amiodarone HCl  200 mg  Q12HR


 PO  3/12/25 22:00


    3/14/25 22:05


200 MG


 


 Guaifenesin/


 Dextromethorphan  10 ml  Q4HP  PRN


 PO  3/12/25 17:30


    3/14/25 02:29


10 ML


 


 Peritoneal


 Dialysis Solution  2,000 ml  0600,1000,1400,1800,2200


 IP  3/13/25 18:00


    3/14/25 21:26


2,000 ML


 


 Lactulose  30 ml  Q12H


 PO  3/14/25 02:00


     





 


 Amino Acids/


 Electrolytes/


 Dextrose  2,000 ml @ 


 41 mls/hr  DAILY@2200


 IV  3/13/25 22:00


    3/13/25 22:21


41 MLS/HR


 


 Amino Acids/


 Electrolytes/


 Dextrose  2,000 ml @ 


 41 mls/hr  DAILY@2200


 IV  3/14/25 22:00


    3/14/25 22:08


41 MLS/HR








objective


Gen.: Patient lying in bed in no apparent distress. On room air


Head: Normocephalic, atraumatic.


Eyes: EOMI/PERRLA.


Ears: Normal hearing. Normal anatomy.


Neck/trachea: Trachea midline, supple.


Nose: Normal external anatomy.


Mouth: Moist mucous membranes.


Chest: Decreased air entry bilaterally. No wheezing or rhonchi.


Cardiovascular: Positive S1, positive S2. Regular rate and rhythm.


Abdomen: Positive bowel sounds in all 4 quadrants. Soft, non-tender, non-

distended.


: Deferred.


Rectal: Deferred.


Skin: Warm, dry. Intact.


Extremities: 2+ radial pulses bilaterally. No lower extremity edema.


Neuro: Awake, alert, oriented x3. No gross motor or sensory deficits. Cranial 

nerves II through XII intact. Gait not assessed.


laboratory and microbiology


                                Laboratory Tests


3/14/25 03:30

















Test


 3/14/25


03:30 Range/Units


 


 


Serum Glucose 108 H   mg/dL








Assessment/Plan


Impression:


Acute hypoxic respiratory failure


Shock


Atrial fibrillation w/ RVR


Lactic acidosis


End-stage renal disease, on peritoneal dialysis


Obesity





Events:


Remains on room air.


Supplemental oxygen PRN


Improved O2 requirements





Bowel regimen for constipation





Peritoneal dialysis today





Remains on pressors for hemodynamic support


On Levophed 4 mcg/min, Iam-Synephrine 180 mcg/min


Titrate to keep mean arterial pressure greater than 65 mmHg.





AFib, rate controlled.





Continue antibiotics


Antitussive for cough


Blood cultures show no growth after 5 days





Hemoglobin stable at 9.6 g/dL





Continue Protonix BID


Clinimix for nutritional support





Monitor hemoglobin


Transfuse if less than 7.0 g/dL.





Monitor renal function


Monitor electrolytes. Supplement as necessary.


Hyponatremia - sodium trending down to 123


Nephrology recs appreciated





Wound care.


NPO





Chest x-ray demonstrates left basilar atelectasis or pneumonia.





Plan for CT abdomen and pelvis to rule out colonic ileus.


 


Labs and imaging reviewed.


Rest of plan as noted below.





Plan:


Supplemental oxygen PRN


Titrate to keep O2 sats above 92%.





Incentive spirometry





Continue antibiotics - meropenem


Follow up cultures





Amiodarone PO


Cardiology recs appreciated.





On pressors for hemodynamic support


Titrate to keep mean arterial pressure greater than 65 mmHg.





Monitor renal function.


PD per Nephrology


Monitor electrolytes. Supplement as necessary.


Monitor ins and outs.


Nephrology recommendations appreciated





Diet and lifestyle modifications for weight reduction


Obesity - complicates all care





DVT prophylaxis.





Prognosis: Poor given patient's multiple co-morbidities.


Condition: Critical





Rest of plan per hospitalist and other consultants.





A total of 35 minutes of critical care time was spent reviewing the patient 

record, examining the patient, making a diagnostic and therapeutic plan, 

discussing this plan with the medical personnel, following up on diagnostic 

studies and following the patient for clinical stability excluding any and all 

procedures.  At least 50% of this time was spent in direct, face-to-face 

contact.





Thank you, Dr. Platt, for allowing me to participate in this patient's care.


Further recommendations will depend on the patient's clinical course.


Please do not hesitate to contact me if you have any questions or concerns.





This medical document was created using an electronic medical record system with

Dragon computerized dictation system. Although these documentations are being 

carefully reviewed, there may still be some phonetic and typographical changes. 

The errors are purely typographical, due to imperfection on the software 

program, and do not reflect any compromise in the patient's medical care.





Dietary Evaluation Review


Comments:  


Encourage high protein diet. consider Nepro 240ml 19, 432 kcal. PO  


supplements BID


Expected Outcomes/Goals:  


maintain protein levels WNL


Plan discussed with:  Other (BALDO Brink)


Critical Care Time(min):  35











JOSEPH SUAREZ MD             Mar 14, 2025 23:00

## 2025-03-14 NOTE — DVHPN2
Progress Note


Date Seen:  Mar 13, 2025


Medical Necessity Reason


Pt with a Central, PICC or Fol:  Yes





Objective


vital signs





                                   Vital Sign








  Date Time  Temp Pulse Resp B/P (MAP) Pulse Ox O2 Delivery O2 Flow Rate FiO2


 


3/14/25 09:45  81 11 91/49 (63) 100   


 


3/14/25 08:01 98.7       





 98.7       


 


3/14/25 06:26      Room Air* 0 21














                           Total Intake and Output   


 


 3/13/25 3/13/25 3/14/25





 15:00 23:00 07:00


 


Intake Total 527.063 ml 4558.066 ml 2379.369 ml


 


Output Total   1 ml


 


Balance 527.063 ml 4558.066 ml 2378.369 ml








medications





                               Current Medications








 Medications  Dose


 Ordered  Sig/Anthony


 Route  Start Time


 Stop Time Status Last Admin


Dose Admin


 


 Ondansetron HCl  4 mg  Q4HP  PRN


 IV  3/4/25 17:30


    3/11/25 05:46


4 MG


 


 Morphine Sulfate  2 mg  Q4HPRN  PRN


 IV  3/4/25 17:30


    3/12/25 23:43


2 MG


 


 Morphine Sulfate  2 mg  Q30M  PRN


 IV  3/4/25 17:30


     





 


 Acetaminophen/


 Hydrocodone Bitart  1 tab  Q4HP  PRN


 PO  3/4/25 17:30


    3/13/25 22:23


1 TAB


 


 Acetaminophen  650 mg  Q6HP  PRN


 PO  3/4/25 17:30


    3/10/25 09:57


650 MG


 


 Nitroglycerin  0.4 mg  Q5MINP  PRN


 SL  3/4/25 17:30


     





 


 Hydroxychloroquine


 Sulfate  200 mg  BID


 PO  3/4/25 22:00


    3/14/25 11:57


200 MG


 


 Levothyroxine


 Sodium  100 mcg  DAILY


 PO  3/5/25 10:00


    3/14/25 11:56


100 MCG


 


 Atorvastatin


 Calcium  10 mg  HS


 PO  3/4/25 22:00


    3/13/25 22:19


10 MG


 


 Docusate Sodium  100 mg  BIDP  PRN


 PO  3/4/25 21:00


     





 


 Budesonide  0.5 mg  BID


 NEB  3/4/25 22:00


    3/14/25 06:26


0.5 MG


 


 Midodrine  10 mg  TID@0600,1200,1800


 PO  3/5/25 06:00


    3/14/25 12:03


10 MG


 


 Levalbuterol HCl  0.625 mg  Q6HR


 NEB  3/6/25 06:00


    3/14/25 06:26


0.625 MG


 


 Phenylephrine HCl


 80 mg/Sodium


 Chloride  250 ml @ 


 7.5 mls/hr  Q24H


 IV  3/6/25 15:00


    3/14/25 10:25


33.75 MLS/HR


 


 Vancomycin HCl  0 ml @ 0


 mls/hr  UD


 IV  3/8/25 09:00


     





 


 Meropenem  50 ml @ 17


 mls/hr  DAILY


 IV  3/10/25 10:00


    3/14/25 11:56


17 MLS/HR


 


 Norepinephrine


 Bitartrate 32 mg/


 Sodium Chloride  250 ml @ 


 0.938 mls/


 hr  Q24H


 IV  3/9/25 13:45


    3/14/25 09:27


0.938 MLS/HR


 


 Epoetin Kiran-epbx  10,000 unit  MWF@2100


 SC  3/12/25 21:00


   Hold  





 


 Amino Acids  0 ml @ 0


 mls/hr  PER  PHARMACY


 IV  3/10/25 15:00


     





 


 Diagnostic Test


  (Pha)  1 strip  Q6HR


 VI  3/10/25 18:00


    3/14/25 12:10


1 STRIP


 


 Insulin Human


 Regular  FOLLOW


 SLIDING


 SCALE  Q6HR


 SC  3/10/25 18:00


    3/14/25 12:08


2 UNITS


 


 Dextrose  50 ml  UD


 IV  3/10/25 15:30


     





 


 Polyethylene


 Glycol  17 gm  BID


 PO  3/11/25 14:45


    3/14/25 11:56


17 GM


 


 Pantoprazole


 Sodium  40 mg  BID


 IV  3/11/25 22:00


    3/14/25 11:56


40 MG


 


 Epoetin Kiran-epbx  10,000 unit  ONCE@2100


 SC  3/12/25 21:00


    3/13/25 22:22


10,000 UNIT


 


 Amiodarone HCl  200 mg  Q12HR


 PO  3/12/25 22:00


    3/14/25 11:56


200 MG


 


 Guaifenesin/


 Dextromethorphan  10 ml  Q4HP  PRN


 PO  3/12/25 17:30


    3/14/25 02:29


10 ML


 


 Peritoneal


 Dialysis Solution  2,000 ml  0600,1000,1400,1800,2200


 IP  3/13/25 18:00


    3/14/25 11:41


2,000 ML


 


 Lactulose  30 ml  Q12H


 PO  3/14/25 02:00


     





 


 Amino Acids/


 Electrolytes/


 Dextrose  2,000 ml @ 


 41 mls/hr  DAILY@2200


 IV  3/13/25 22:00


    3/13/25 22:21


41 MLS/HR


 


 Magnesium Sulfate/


 Dextrose  100 ml @ 


 100 mls/hr  Q1HR


 IV  3/14/25 13:00


 3/14/25 14:59   











laboratory and microbiology


                                Laboratory Tests


3/14/25 03:30

















Test


 3/14/25


03:30 Range/Units


 


 


Serum Glucose 108 H   mg/dL








                                  Microbiology








 Date/Time


Source Procedure


Growth Status





 


 3/8/25 16:30


Blood Blood Culture - Final


NO GROWTH AFTER 5 DAYS OF INCUBATION. Complete





 3/6/25 05:30


Peritoneal Fluid Gram Stain - Final


 Complete


 


 3/6/25 05:30


Peritoneal Fluid Body Fluid Culture - Final


 Complete


 


 3/5/25 23:00


Nose MRSA Screen - Final


 Complete











Problem List/Assessment/Plan


Problem List/Assessment/Plan


AFEBRILE


VSS


ABD SOFT


BM +


CONTINUE CLOSE OBSERVATION


Plan discussed with:  Other





Dietary Evaluation Review


Comments:  


Encourage high protein diet. consider Nepro 240ml 19, 432 kcal. PO  


supplements BID


Expected Outcomes/Goals:  


maintain protein levels WNL











ANA CARLSON MD             Mar 14, 2025 12:56

## 2025-03-14 NOTE — DVHPN2
Subjective


Overnight events noted.  Patient's remains on IV vasopressor.


Reviewed:  Care Plan


Changes from previous H/P or p:  No Changes


Eyes:  No Pain, No Vision change, No Conjunctivae inflammation, No Eyelid 

inflammation, No Other, No Redness


ENT:  No Ear pain, No Ear discharge, No Nose pain, No Nose discharge, No Nose 

congestion, No Mouth pain, No Mouth swelling, No Throat pain, No Throat 

swelling, No Other


Cardiovascular:  Chest Pain; No Palpitations, No Orthopnea, No Paroxysmal Noc. 

Dyspnea, No Edema, No Lt Headedness, No Other


Respiratory:  No Cough, No Dry; Shortness of breath; No SOB with excertion, No 

Wheezing, No Hemoptysis, No Pleuritic Pain, No Sputum, No Other


Gastrointestinal:  No Nausea, No Vomiting; Abdominal Pain, Diarrhea; No 

Constipation, No Melena, No Hematochezia, No Other


Genitourinary:  No Dysuria, No Frequency, No Incontinence, No Hematuria, No 

Retention, No Other


Musculoskeletal:  No other, No neck pain, No shoulder pain, No arm pain, No back

pain, No hand pain, No leg pain, No foot pain


Skin:  No Rash, No Lesions, No Jaundice, No Bruising, No Other





Objective


Vitals





Vital Signs








  Date Time  Temp Pulse Resp B/P (MAP) Pulse Ox O2 Delivery O2 Flow Rate FiO2


 


3/14/25 16:16  75 14 96/28 (50) 98   


 


3/14/25 16:00 97.0       





 97.0       


 


3/14/25 16:00      Room Air* 0 21








Intake/Output











                               Intake and Output 


 


 3/14/25





 07:00


 


Intake Total 7464.498 ml


 


Output Total 1 ml


 


Balance 7463.498 ml


 


 


 


Intake Oral 260 ml


 


IV Total 1104.498 ml


 


Intraperitoneal 6100 ml


 


Stool Total 1 ml


 


# Bowel Movements 2








Exam


HEENT pupils are reactive 


Neck is supple 


CV is S1-S2 regular rate and rhythm 


Respiratory diminished breath sounds bases 


GI positive bowel sound


Extremity no edema 


CNS no motor deficits, physical deconditioned


Medications





                               Current Medications








 Medications  Dose


 Ordered  Sig/Anthony


 Route  Start Time


 Stop Time Status Last Admin


Dose Admin


 


 Ondansetron HCl  4 mg  Q4HP  PRN


 IV  3/4/25 17:30


    3/14/25 14:58


4 MG


 


 Morphine Sulfate  2 mg  Q4HPRN  PRN


 IV  3/4/25 17:30


    3/12/25 23:43


2 MG


 


 Morphine Sulfate  2 mg  Q30M  PRN


 IV  3/4/25 17:30


     





 


 Acetaminophen/


 Hydrocodone Bitart  1 tab  Q4HP  PRN


 PO  3/4/25 17:30


    3/13/25 22:23


1 TAB


 


 Acetaminophen  650 mg  Q6HP  PRN


 PO  3/4/25 17:30


    3/10/25 09:57


650 MG


 


 Nitroglycerin  0.4 mg  Q5MINP  PRN


 SL  3/4/25 17:30


     





 


 Hydroxychloroquine


 Sulfate  200 mg  BID


 PO  3/4/25 22:00


    3/14/25 11:57


200 MG


 


 Levothyroxine


 Sodium  100 mcg  DAILY


 PO  3/5/25 10:00


    3/14/25 11:56


100 MCG


 


 Atorvastatin


 Calcium  10 mg  HS


 PO  3/4/25 22:00


    3/13/25 22:19


10 MG


 


 Docusate Sodium  100 mg  BIDP  PRN


 PO  3/4/25 21:00


     





 


 Budesonide  0.5 mg  BID


 NEB  3/4/25 22:00


    3/14/25 06:26


0.5 MG


 


 Midodrine  10 mg  TID@0600,1200,1800


 PO  3/5/25 06:00


    3/14/25 12:03


10 MG


 


 Levalbuterol HCl  0.625 mg  Q6HR


 NEB  3/6/25 06:00


    3/14/25 12:56


0.625 MG


 


 Phenylephrine HCl


 80 mg/Sodium


 Chloride  250 ml @ 


 7.5 mls/hr  Q24H


 IV  3/6/25 15:00


    3/14/25 10:25


33.75 MLS/HR


 


 Vancomycin HCl  0 ml @ 0


 mls/hr  UD


 IV  3/8/25 09:00


     





 


 Meropenem  50 ml @ 17


 mls/hr  DAILY


 IV  3/10/25 10:00


    3/14/25 11:56


17 MLS/HR


 


 Norepinephrine


 Bitartrate 32 mg/


 Sodium Chloride  250 ml @ 


 0.938 mls/


 hr  Q24H


 IV  3/9/25 13:45


    3/14/25 09:27


0.938 MLS/HR


 


 Epoetin Kiran-epbx  10,000 unit  MWF@2100


 SC  3/12/25 21:00


   Hold  





 


 Amino Acids  0 ml @ 0


 mls/hr  PER  PHARMACY


 IV  3/10/25 15:00


     





 


 Diagnostic Test


  (Pha)  1 strip  Q6HR


 VI  3/10/25 18:00


    3/14/25 12:10


1 STRIP


 


 Insulin Human


 Regular  FOLLOW


 SLIDING


 SCALE  Q6HR


 SC  3/10/25 18:00


    3/14/25 12:08


2 UNITS


 


 Dextrose  50 ml  UD


 IV  3/10/25 15:30


     





 


 Polyethylene


 Glycol  17 gm  BID


 PO  3/11/25 14:45


    3/14/25 11:56


17 GM


 


 Pantoprazole


 Sodium  40 mg  BID


 IV  3/11/25 22:00


    3/14/25 11:56


40 MG


 


 Epoetin Kiran-epbx  10,000 unit  ONCE@2100


 SC  3/12/25 21:00


    3/13/25 22:22


10,000 UNIT


 


 Amiodarone HCl  200 mg  Q12HR


 PO  3/12/25 22:00


    3/14/25 11:56


200 MG


 


 Guaifenesin/


 Dextromethorphan  10 ml  Q4HP  PRN


 PO  3/12/25 17:30


    3/14/25 02:29


10 ML


 


 Peritoneal


 Dialysis Solution  2,000 ml  0600,1000,1400,1800,2200


 IP  3/13/25 18:00


    3/14/25 11:41


2,000 ML


 


 Lactulose  30 ml  Q12H


 PO  3/14/25 02:00


     





 


 Amino Acids/


 Electrolytes/


 Dextrose  2,000 ml @ 


 41 mls/hr  DAILY@2200


 IV  3/13/25 22:00


    3/13/25 22:21


41 MLS/HR


 


 Amino Acids/


 Electrolytes/


 Dextrose  2,000 ml @ 


 41 mls/hr  DAILY@2200


 IV  3/14/25 22:00


     














Laboratory Results


Laboratory Tests


3/14/25 03:30











Chemistry








Test


 3/14/25


03:30


 


Calcium Level


 8.7 mg/dL


(8.7-10.4)


 


Magnesium Level


 1.7 mg/dL


(1.6-2.6)


 


Phosphorus Level


 3.3 mg/dL


(2.4-5.1)








Urinalysis








Test


 3/4/25


16:30


 


Urine Color


 Colorless


(Yellow)


 


Urine Clarity Clear (Clear)  


 


Urine pH 7.5 (5.0-9.0)  


 


Urine Specific Gravity


 1.007


(1.001-1.035)


 


Urine Protein


 1+ (Negative)


H


 


Urine Ketones


 Negative


(Negative)


 


Urine Blood


 Negative /uL


(Negative)


 


Urine Nitrite


 Negative


(Negative)


 


Urine Bilirubin


 Negative


(Negative)


 


Urine Urobilinogen


 Normal mg/dL


(Negative)


 


Urine Leukocyte Esterase


 Negative /uL


(Negative)


 


Urine RBC


 6 /hpf (0 - 4)





 


Urine Microscopic WBC 3 /HPF (0-5)  


 


Urine Squamous Epithelial


Cells None seen /hpf


(<5)


 


Urine Bacteria


 None seen /hpf


(None Seen)


 


Urine Mucus


 Few (None


Seen)


 


Urine Glucose


 4+ mg/dL


(Normal)  H








Microbiology





                                  Microbiology








 Date/Time


Source Procedure


Growth Status





 


 3/8/25 16:30


Blood Blood Culture - Final


NO GROWTH AFTER 5 DAYS OF INCUBATION. Complete





 3/6/25 05:30


Peritoneal Fluid Gram Stain - Final


 Complete


 


 3/6/25 05:30


Peritoneal Fluid Body Fluid Culture - Final


 Complete


 


 3/5/25 23:00


Nose MRSA Screen - Final


 Complete











Assessment/Plan


Assessment/Plan


82-year-old female with past medical history of ESRD on PD, AFib diagnosed 

November 21, 2024, chronic abdominal pain, and diarrhea after taking lactulose 

presents after being sent in from urgent care for an abnormal EKG.  Patient 

initially went to urgent care with complaints of chest pain, recent diagnosis of

pneumonia and shortness of breath.  During the emergency department evaluation 

potassium levels found to be 5.7/5.9.  Patient also found to be AFib 130s.  

Patient was recently discharged from [facility] on November 21, 2024 on Eliquis 2.5 and amiodarone 


1. Chest pain MI ruled out


2. Hyperkalemia, improved


3. End-stage renal disease on peritoneal dialysis


4. Hypotension with a relative history of hypertension, currently on two IV 

vasopressor


5. Chronic abdominal pain


6. Chronic AFib 


7. Hypothyroidism


8. Asthma/COPD 


-continue current meds.


-titrate vasopressor, swallow evaluation and.  Right if tolerated


Nephrology consultation appreciated, cardiology follow up.


Plan discussed with:  Other (Patient's bedside BALDO Brink)





Date of Service:  Mar 14, 2025


Billing Provider:  MARÍA OCONNELL MD


Common Visit Codes:  NOT BILLABLE











MARÍA OCONNELL MD             Mar 14, 2025 16:56

## 2025-03-14 NOTE — DVH
EXAM:  XR Chest, 1 View



CLINICAL INDICATION:   PRODUCTIVE COUGH, CRACKLE LUNG SOUNDS



TECHNIQUE:  Frontal view of the chest.



COMPARISON:   XY CHEST PORTABLE on DOS: 3/10/25, XY CHEST XRAY 1 VIEW on DOS: 3/6/25, XY CHEST PORTAB
LE on DOS: 3/4/25, CHEST PORTABLE on DOS: 12/27/22, CXRP on DOS: 12/27/22



FINDINGS:



 LUNGS AND PLEURAL SPACES:  Left basilar atelectasis or pneumonia.  No pneumothorax.



 HEART:  Unremarkable.  No cardiomegaly.



 MEDIASTINUM:  Unremarkable.  Normal mediastinal contour.



 BONES/JOINTS:  Unremarkable.  No acute fracture.



 TUBES, LINES AND DEVICES:  Right internal jugular central venous catheter tip in the superior vena c
juan pablo.



 OTHER FINDINGS:  .  .



IMPRESSION:     



 Left basilar atelectasis or pneumonia.



Electronically Signed by: Armando Sahni at 03/14/2025 05:50:16 AM

## 2025-03-14 NOTE — DVHPN2
Progress Note - Dictate


Date Seen:  Mar 14, 2025


Medical Necessity Reason


Pt with a Central, PICC or Fol:  Yes


Subjective


Hypotensive, PD fluid clear


vital signs





                                   Vital Sign








  Date Time  Temp Pulse Resp B/P (MAP) Pulse Ox O2 Delivery O2 Flow Rate FiO2


 


3/14/25 09:45  81 11 91/49 (63) 100   


 


3/14/25 08:01 98.7       





 98.7       


 


3/14/25 06:26      Room Air* 0 21














                           Total Intake and Output   


 


 3/13/25 3/13/25 3/14/25





 15:00 23:00 07:00


 


Intake Total 527.063 ml 4558.066 ml 2379.369 ml


 


Output Total   1 ml


 


Balance 527.063 ml 4558.066 ml 2378.369 ml








medications





                               Current Medications








 Medications  Dose


 Ordered  Sig/Anthony


 Route  Start Time


 Stop Time Status Last Admin


Dose Admin


 


 Ondansetron HCl  4 mg  Q4HP  PRN


 IV  3/4/25 17:30


    3/11/25 05:46


4 MG


 


 Morphine Sulfate  2 mg  Q4HPRN  PRN


 IV  3/4/25 17:30


    3/12/25 23:43


2 MG


 


 Morphine Sulfate  2 mg  Q30M  PRN


 IV  3/4/25 17:30


     





 


 Acetaminophen/


 Hydrocodone Bitart  1 tab  Q4HP  PRN


 PO  3/4/25 17:30


    3/13/25 22:23


1 TAB


 


 Acetaminophen  650 mg  Q6HP  PRN


 PO  3/4/25 17:30


    3/10/25 09:57


650 MG


 


 Nitroglycerin  0.4 mg  Q5MINP  PRN


 SL  3/4/25 17:30


     





 


 Hydroxychloroquine


 Sulfate  200 mg  BID


 PO  3/4/25 22:00


    3/14/25 11:57


200 MG


 


 Levothyroxine


 Sodium  100 mcg  DAILY


 PO  3/5/25 10:00


    3/14/25 11:56


100 MCG


 


 Atorvastatin


 Calcium  10 mg  HS


 PO  3/4/25 22:00


    3/13/25 22:19


10 MG


 


 Docusate Sodium  100 mg  BIDP  PRN


 PO  3/4/25 21:00


     





 


 Budesonide  0.5 mg  BID


 NEB  3/4/25 22:00


    3/14/25 06:26


0.5 MG


 


 Midodrine  10 mg  TID@0600,1200,1800


 PO  3/5/25 06:00


    3/14/25 12:03


10 MG


 


 Levalbuterol HCl  0.625 mg  Q6HR


 NEB  3/6/25 06:00


    3/14/25 06:26


0.625 MG


 


 Phenylephrine HCl


 80 mg/Sodium


 Chloride  250 ml @ 


 7.5 mls/hr  Q24H


 IV  3/6/25 15:00


    3/14/25 10:25


33.75 MLS/HR


 


 Vancomycin HCl  0 ml @ 0


 mls/hr  UD


 IV  3/8/25 09:00


     





 


 Meropenem  50 ml @ 17


 mls/hr  DAILY


 IV  3/10/25 10:00


    3/14/25 11:56


17 MLS/HR


 


 Norepinephrine


 Bitartrate 32 mg/


 Sodium Chloride  250 ml @ 


 0.938 mls/


 hr  Q24H


 IV  3/9/25 13:45


    3/14/25 09:27


0.938 MLS/HR


 


 Epoetin Kiran-epbx  10,000 unit  MWF@2100


 SC  3/12/25 21:00


   Hold  





 


 Amino Acids  0 ml @ 0


 mls/hr  PER  PHARMACY


 IV  3/10/25 15:00


     





 


 Diagnostic Test


  (Pha)  1 strip  Q6HR


 VI  3/10/25 18:00


    3/14/25 12:10


1 STRIP


 


 Insulin Human


 Regular  FOLLOW


 SLIDING


 SCALE  Q6HR


 SC  3/10/25 18:00


    3/14/25 12:08


2 UNITS


 


 Dextrose  50 ml  UD


 IV  3/10/25 15:30


     





 


 Polyethylene


 Glycol  17 gm  BID


 PO  3/11/25 14:45


    3/14/25 11:56


17 GM


 


 Pantoprazole


 Sodium  40 mg  BID


 IV  3/11/25 22:00


    3/14/25 11:56


40 MG


 


 Epoetin Kiran-epbx  10,000 unit  ONCE@2100


 SC  3/12/25 21:00


    3/13/25 22:22


10,000 UNIT


 


 Amiodarone HCl  200 mg  Q12HR


 PO  3/12/25 22:00


    3/14/25 11:56


200 MG


 


 Guaifenesin/


 Dextromethorphan  10 ml  Q4HP  PRN


 PO  3/12/25 17:30


    3/14/25 02:29


10 ML


 


 Sevelamer HCl  800 mg  TIDWM


 PO  3/13/25 11:15


    3/14/25 12:10


800 MG


 


 Peritoneal


 Dialysis Solution  2,000 ml  0600,1000,1400,1800,2200


 IP  3/13/25 18:00


    3/14/25 11:41


2,000 ML


 


 Lactulose  30 ml  Q12H


 PO  3/14/25 02:00


     





 


 Amino Acids/


 Electrolytes/


 Dextrose  2,000 ml @ 


 41 mls/hr  DAILY@2200


 IV  3/13/25 22:00


    3/13/25 22:21


41 MLS/HR








objective


Gen: nad


heent: nc/at, mmm


lungs: cta anteriorly


cvs: no rub


abd: soft, bowel sounds audible


ext: no edema


laboratory and microbiology


                                Laboratory Tests


3/14/25 03:30

















Test


 3/14/25


03:30 Range/Units


 


 


Serum Glucose 108 H   mg/dL








Assessment/Plan


Problem List/Assessment/Plan


ESRD on peritoneal dialysis


Stool impaction


Abdominal pain


septic Shock


AFib with RVR


Hyperkalemia, resolved


Lactic acidosis


Dropping hemoglobin


GI bleeding





Recommendations


We will continue with 2.5% PD exchanges


We will replete electrolytes





Dietary Evaluation Review


Comments:  


Encourage high protein diet. consider Nepro 240ml 19, 432 kcal. PO  


supplements BID


Expected Outcomes/Goals:  


maintain protein levels WNL


Plan discussed with:  Other











PRAVEENA PEREZ MD           Mar 14, 2025 12:31

## 2025-03-14 NOTE — DVHPN2
Progress Note


Date Seen:  Mar 14, 2025


Resident Creating Document:  SIMONA OCASIO RESIDENT


Medical Necessity Reason


Pt with a Central, PICC or Fol:  Yes





Subjective


Review of Systems


82-year-old Georgian-speaking female, ER staff translating, with PMH of ESRD, HLD

and HTN presented to ER with chest pain. Patient complains of nausea and 

vomiting for one day, mostly having brown-colored emesis per RN.  Patient has 

abdominal pain, periumbilical area. Last BM five days ago, usually patient has 

bowel movements every 2-3 days.  No melena or red blood in stool. Patient has 

had multiple colonoscopy in the past, last colonoscopy more than five years ago,

unsure of results. Patient has history of GERD.  No history of PUD.  No EGD in 

past.


Patient's abdomen is distended, patient is on peritoneal dialysis, patient got 1

unit of PRBC, today hemoglobin 9.1.  Patient is feeling nauseated but did not 

vomit, patient is getting Reglan.


Currently breathing on room air.





Objective


vital signs





                                   Vital Sign








  Date Time  Temp Pulse Resp B/P (MAP) Pulse Ox O2 Delivery O2 Flow Rate FiO2


 


3/14/25 09:45  81 11 91/49 (63) 100   


 


3/14/25 08:01 98.7       





 98.7       


 


3/14/25 06:26      Room Air* 0 21














                           Total Intake and Output   


 


 3/13/25 3/13/25 3/14/25





 15:00 23:00 07:00


 


Intake Total 527.063 ml 4558.066 ml 2379.369 ml


 


Output Total   1 ml


 


Balance 527.063 ml 4558.066 ml 2378.369 ml








medications





                               Current Medications








 Medications  Dose


 Ordered  Sig/Anthony


 Route  Start Time


 Stop Time Status Last Admin


Dose Admin


 


 Ondansetron HCl  4 mg  Q4HP  PRN


 IV  3/4/25 17:30


    3/11/25 05:46


4 MG


 


 Morphine Sulfate  2 mg  Q4HPRN  PRN


 IV  3/4/25 17:30


    3/12/25 23:43


2 MG


 


 Morphine Sulfate  2 mg  Q30M  PRN


 IV  3/4/25 17:30


     





 


 Acetaminophen/


 Hydrocodone Bitart  1 tab  Q4HP  PRN


 PO  3/4/25 17:30


    3/13/25 22:23


1 TAB


 


 Acetaminophen  650 mg  Q6HP  PRN


 PO  3/4/25 17:30


    3/10/25 09:57


650 MG


 


 Nitroglycerin  0.4 mg  Q5MINP  PRN


 SL  3/4/25 17:30


     





 


 Hydroxychloroquine


 Sulfate  200 mg  BID


 PO  3/4/25 22:00


    3/14/25 11:57


200 MG


 


 Levothyroxine


 Sodium  100 mcg  DAILY


 PO  3/5/25 10:00


    3/14/25 11:56


100 MCG


 


 Atorvastatin


 Calcium  10 mg  HS


 PO  3/4/25 22:00


    3/13/25 22:19


10 MG


 


 Docusate Sodium  100 mg  BIDP  PRN


 PO  3/4/25 21:00


     





 


 Budesonide  0.5 mg  BID


 NEB  3/4/25 22:00


    3/14/25 06:26


0.5 MG


 


 Midodrine  10 mg  TID@0600,1200,1800


 PO  3/5/25 06:00


    3/14/25 12:03


10 MG


 


 Levalbuterol HCl  0.625 mg  Q6HR


 NEB  3/6/25 06:00


    3/14/25 06:26


0.625 MG


 


 Phenylephrine HCl


 80 mg/Sodium


 Chloride  250 ml @ 


 7.5 mls/hr  Q24H


 IV  3/6/25 15:00


    3/14/25 10:25


33.75 MLS/HR


 


 Vancomycin HCl  0 ml @ 0


 mls/hr  UD


 IV  3/8/25 09:00


     





 


 Meropenem  50 ml @ 17


 mls/hr  DAILY


 IV  3/10/25 10:00


    3/14/25 11:56


17 MLS/HR


 


 Norepinephrine


 Bitartrate 32 mg/


 Sodium Chloride  250 ml @ 


 0.938 mls/


 hr  Q24H


 IV  3/9/25 13:45


    3/14/25 09:27


0.938 MLS/HR


 


 Epoetin Kiran-epbx  10,000 unit  MWF@2100


 SC  3/12/25 21:00


   Hold  





 


 Amino Acids  0 ml @ 0


 mls/hr  PER  PHARMACY


 IV  3/10/25 15:00


     





 


 Diagnostic Test


  (Pha)  1 strip  Q6HR


 VI  3/10/25 18:00


    3/14/25 12:10


1 STRIP


 


 Insulin Human


 Regular  FOLLOW


 SLIDING


 SCALE  Q6HR


 SC  3/10/25 18:00


    3/14/25 12:08


2 UNITS


 


 Dextrose  50 ml  UD


 IV  3/10/25 15:30


     





 


 Polyethylene


 Glycol  17 gm  BID


 PO  3/11/25 14:45


    3/14/25 11:56


17 GM


 


 Pantoprazole


 Sodium  40 mg  BID


 IV  3/11/25 22:00


    3/14/25 11:56


40 MG


 


 Epoetin Kiran-epbx  10,000 unit  ONCE@2100


 SC  3/12/25 21:00


    3/13/25 22:22


10,000 UNIT


 


 Amiodarone HCl  200 mg  Q12HR


 PO  3/12/25 22:00


    3/14/25 11:56


200 MG


 


 Guaifenesin/


 Dextromethorphan  10 ml  Q4HP  PRN


 PO  3/12/25 17:30


    3/14/25 02:29


10 ML


 


 Sevelamer HCl  800 mg  TIDWM


 PO  3/13/25 11:15


    3/14/25 12:10


800 MG


 


 Peritoneal


 Dialysis Solution  2,000 ml  0600,1000,1400,1800,2200


 IP  3/13/25 18:00


    3/14/25 11:41


2,000 ML


 


 Lactulose  30 ml  Q12H


 PO  3/14/25 02:00


     





 


 Amino Acids/


 Electrolytes/


 Dextrose  2,000 ml @ 


 41 mls/hr  DAILY@2200


 IV  3/13/25 22:00


    3/13/25 22:21


41 MLS/HR








Examination


GENERAL:  Not in acute distress.  


HEENT: EOMI,  Moist mucous membranes.  No scleral icterus.  No cervical 

lymphadenopathy.


LUNGS: Clear to auscultation bilaterally.  No accessory muscle use.


CARDIOVASCULAR: Regular rate and rhythm.  No murmur.  No JVD.


ABDOMEN: Mild-to-moderate periumbilical tenderness to palpation, +BS, ABD is 

distended


EXTREMITIES: No edema.  Nontender.


SKIN: No rashes or lesions.  Warm.


NEUROLOGIC:  Alert and oriented X3.


laboratory and microbiology


                                Laboratory Tests


3/14/25 03:30

















Test


 3/14/25


03:30 Range/Units


 


 


Serum Glucose 108 H   mg/dL








                                  Microbiology








 Date/Time


Source Procedure


Growth Status





 


 3/8/25 16:30


Blood Blood Culture - Final


NO GROWTH AFTER 5 DAYS OF INCUBATION. Complete





 3/6/25 05:30


Peritoneal Fluid Gram Stain - Final


 Complete


 


 3/6/25 05:30


Peritoneal Fluid Body Fluid Culture - Final


 Complete


 


 3/5/25 23:00


Nose MRSA Screen - Final


 Complete











Problem List/Assessment/Plan


Problem List/Assessment/Plan


# Intractable nausea vomiting


# Abdominal pain


# GI bleed


# Anemia


# ESRD on peritoneal dialysis


# AFib on blood thinners


# Lactic acidosis





- SOBT negative


- patient got one unit PRBC, hemoglobin is stable now.  today hemoglobin is 9.6


- Monitor H&H, transfuse if hemoglobin less than 7.0


- Colace b.i.d., consider use of lactulose if no bowel movements


- Plan for EGD when  patient is stable


- Continue Protonix 40 mg  b.i.d.


- Cont. MiraLax


- bedside fecal disimpaction done


- surgery is on board to rule out obstruction





Patient is stable on GI point of view, GI will follow as needed


Thank you so much for the opportunity to consult on your patient. In case of any

questions or concerns please feel free to reach out.





Case discussed with Dr. JERONIMO Leblanc.  The patient and caregiver team agreed to the

plan.


Plan discussed with:  Patient





Dietary Evaluation Review


Comments:  


Encourage high protein diet. consider Nepro 240ml 19, 432 kcal. PO  


supplements BID


Expected Outcomes/Goals:  


maintain protein levels WNL











SIMONA OCASIO RESIDENT        Mar 14, 2025 12:24

## 2025-03-15 NOTE — DVHPN2
Progress Note - Dictate


Date Seen:  Mar 15, 2025


Medical Necessity Reason


Pt with a Central, PICC or Fol:  Yes


Subjective


Patient seen and examined at bedside.


Overnight events reviewed.


vital signs





                                   Vital Sign








  Date Time  Temp Pulse Resp B/P (MAP) Pulse Ox O2 Delivery O2 Flow Rate FiO2


 


3/15/25 12:00 97.9 66 10 125/60 (81) 100   





 97.9       


 


3/15/25 11:46      Room Air 0.0 


 


3/15/25 11:46        21














                           Total Intake and Output   


 


 3/14/25 3/14/25 3/15/25





 15:00 23:00 07:00


 


Intake Total 701.047 ml 3068.125 ml 617.6 ml


 


Output Total 2400 ml 2800 ml 900 ml


 


Balance -1698.953 ml 268.125 ml -282.4 ml








medications





                               Current Medications








 Medications  Dose


 Ordered  Sig/Anthony


 Route  Start Time


 Stop Time Status Last Admin


Dose Admin


 


 Ondansetron HCl  4 mg  Q4HP  PRN


 IV  3/4/25 17:30


    3/14/25 14:58


4 MG


 


 Morphine Sulfate  2 mg  Q4HPRN  PRN


 IV  3/4/25 17:30


    3/12/25 23:43


2 MG


 


 Morphine Sulfate  2 mg  Q30M  PRN


 IV  3/4/25 17:30


     





 


 Acetaminophen/


 Hydrocodone Bitart  1 tab  Q4HP  PRN


 PO  3/4/25 17:30


    3/15/25 11:00


1 TAB


 


 Acetaminophen  650 mg  Q6HP  PRN


 PO  3/4/25 17:30


    3/15/25 09:30


650 MG


 


 Nitroglycerin  0.4 mg  Q5MINP  PRN


 SL  3/4/25 17:30


     





 


 Hydroxychloroquine


 Sulfate  200 mg  BID


 PO  3/4/25 22:00


    3/15/25 10:19


200 MG


 


 Levothyroxine


 Sodium  100 mcg  DAILY


 PO  3/5/25 10:00


    3/15/25 10:19


100 MCG


 


 Atorvastatin


 Calcium  10 mg  HS


 PO  3/4/25 22:00


    3/14/25 22:05


10 MG


 


 Docusate Sodium  100 mg  BIDP  PRN


 PO  3/4/25 21:00


     





 


 Budesonide  0.5 mg  BID


 NEB  3/4/25 22:00


    3/15/25 07:22


0.5 MG


 


 Midodrine  10 mg  TID@0600,1200,1800


 PO  3/5/25 06:00


    3/15/25 12:09


10 MG


 


 Levalbuterol HCl  0.625 mg  Q6HR


 NEB  3/6/25 06:00


    3/15/25 11:46


0.625 MG


 


 Phenylephrine HCl


 80 mg/Sodium


 Chloride  250 ml @ 


 7.5 mls/hr  Q24H


 IV  3/6/25 15:00


    3/15/25 09:31


33.75 MLS/HR


 


 Vancomycin HCl  0 ml @ 0


 mls/hr  UD


 IV  3/8/25 09:00


     





 


 Meropenem  50 ml @ 17


 mls/hr  DAILY


 IV  3/10/25 10:00


    3/15/25 10:19


17 MLS/HR


 


 Norepinephrine


 Bitartrate 32 mg/


 Sodium Chloride  250 ml @ 


 0.938 mls/


 hr  Q24H


 IV  3/9/25 13:45


    3/14/25 09:27


0.938 MLS/HR


 


 Epoetin Kiran-epbx  10,000 unit  MWF@2100


 SC  3/12/25 21:00


    3/14/25 22:05


10,000 UNIT


 


 Amino Acids  0 ml @ 0


 mls/hr  PER  PHARMACY


 IV  3/10/25 15:00


     





 


 Diagnostic Test


  (Pha)  1 strip  Q6HR


 VI  3/10/25 18:00


    3/15/25 12:09


1 STRIP


 


 Insulin Human


 Regular  FOLLOW


 SLIDING


 SCALE  Q6HR


 SC  3/10/25 18:00


    3/14/25 12:08


2 UNITS


 


 Dextrose  50 ml  UD


 IV  3/10/25 15:30


     





 


 Polyethylene


 Glycol  17 gm  BID


 PO  3/11/25 14:45


    3/15/25 10:19


17 GM


 


 Pantoprazole


 Sodium  40 mg  BID


 IV  3/11/25 22:00


    3/15/25 10:19


40 MG


 


 Amiodarone HCl  200 mg  Q12HR


 PO  3/12/25 22:00


    3/15/25 10:19


200 MG


 


 Guaifenesin/


 Dextromethorphan  10 ml  Q4HP  PRN


 PO  3/12/25 17:30


    3/14/25 02:29


10 ML


 


 Peritoneal


 Dialysis Solution  2,000 ml  0600,1000,1400,1800,2200


 IP  3/13/25 18:00


    3/15/25 11:50


2,000 ML


 


 Lactulose  30 ml  Q12H


 PO  3/14/25 02:00


    3/15/25 02:25


30 ML


 


 Amino Acids/


 Electrolytes/


 Dextrose  2,000 ml @ 


 41 mls/hr  DAILY@2200


 IV  3/14/25 22:00


    3/14/25 22:08


41 MLS/HR








laboratory and microbiology


                                Laboratory Tests


3/15/25 03:17

















Test


 3/15/25


03:17 Range/Units


 


 


Serum Glucose 141 H   mg/dL








Assessment/Plan


Impression


Acute hypoxemic respiratory failure


Lactic acidosis 


Septic shock 


Pneumonia





Patient seen and examined in ICU





Events


Low oxygen requirements


On room air 


On Levophed and Iam-synephrine drip 


 


Labs and imaging reviewed  


Chest x-ray shows left basilar atelectasis 





Management


Supplemental oxygen as needed


Titrate to maintain sats 90% or above  


 


Incentive spirometry





Continue antibiotics


F/u cultures 





Bronchodilators





Stress dose steroids 


Hydrocortisone 50mg IV q6H





Monitor renal function


F/u nephrology, management deferred 





Monitor electrolytes


Supplement as needed  





Pressors as needed for hemodynamic support


To maintain a mean arterial pressure of 65mmHg 





DVT prophylaxis





Critical care time 35 minutes





Dietary Evaluation Review


Comments:  


Encourage high protein diet. consider Nepro 240ml 19, 432 kcal. PO  


supplements BID


Expected Outcomes/Goals:  


maintain protein levels WNL


Plan discussed with:  Patient











CLAIRE CHRISTY MD            Mar 15, 2025 16:04

## 2025-03-15 NOTE — DVHPN2
Progress Note - Dictate


Date Seen:  Mar 15, 2025


Medical Necessity Reason


Pt with a Central, PICC or Fol:  Yes


Subjective


Awake this afternoon, currently with PD fluid indwelling.


vital signs





                                   Vital Sign








  Date Time  Temp Pulse Resp B/P (MAP) Pulse Ox O2 Delivery O2 Flow Rate FiO2


 


3/15/25 12:00 97.9 66 10 125/60 (81) 100   





 97.9       


 


3/15/25 11:46      Room Air 0.0 


 


3/15/25 11:46        21














                           Total Intake and Output   


 


 3/14/25 3/14/25 3/15/25





 15:00 23:00 07:00


 


Intake Total 701.047 ml 3068.125 ml 617.6 ml


 


Output Total 2400 ml 2800 ml 900 ml


 


Balance -1698.953 ml 268.125 ml -282.4 ml








medications





                               Current Medications








 Medications  Dose


 Ordered  Sig/Anthony


 Route  Start Time


 Stop Time Status Last Admin


Dose Admin


 


 Ondansetron HCl  4 mg  Q4HP  PRN


 IV  3/4/25 17:30


    3/14/25 14:58


4 MG


 


 Morphine Sulfate  2 mg  Q4HPRN  PRN


 IV  3/4/25 17:30


    3/12/25 23:43


2 MG


 


 Morphine Sulfate  2 mg  Q30M  PRN


 IV  3/4/25 17:30


     





 


 Acetaminophen/


 Hydrocodone Bitart  1 tab  Q4HP  PRN


 PO  3/4/25 17:30


    3/15/25 11:00


1 TAB


 


 Acetaminophen  650 mg  Q6HP  PRN


 PO  3/4/25 17:30


    3/15/25 09:30


650 MG


 


 Nitroglycerin  0.4 mg  Q5MINP  PRN


 SL  3/4/25 17:30


     





 


 Hydroxychloroquine


 Sulfate  200 mg  BID


 PO  3/4/25 22:00


    3/15/25 10:19


200 MG


 


 Levothyroxine


 Sodium  100 mcg  DAILY


 PO  3/5/25 10:00


    3/15/25 10:19


100 MCG


 


 Atorvastatin


 Calcium  10 mg  HS


 PO  3/4/25 22:00


    3/14/25 22:05


10 MG


 


 Docusate Sodium  100 mg  BIDP  PRN


 PO  3/4/25 21:00


     





 


 Budesonide  0.5 mg  BID


 NEB  3/4/25 22:00


    3/15/25 07:22


0.5 MG


 


 Midodrine  10 mg  TID@0600,1200,1800


 PO  3/5/25 06:00


    3/15/25 12:09


10 MG


 


 Levalbuterol HCl  0.625 mg  Q6HR


 NEB  3/6/25 06:00


    3/15/25 11:46


0.625 MG


 


 Phenylephrine HCl


 80 mg/Sodium


 Chloride  250 ml @ 


 7.5 mls/hr  Q24H


 IV  3/6/25 15:00


    3/15/25 09:31


33.75 MLS/HR


 


 Vancomycin HCl  0 ml @ 0


 mls/hr  UD


 IV  3/8/25 09:00


     





 


 Meropenem  50 ml @ 17


 mls/hr  DAILY


 IV  3/10/25 10:00


    3/15/25 10:19


17 MLS/HR


 


 Norepinephrine


 Bitartrate 32 mg/


 Sodium Chloride  250 ml @ 


 0.938 mls/


 hr  Q24H


 IV  3/9/25 13:45


    3/14/25 09:27


0.938 MLS/HR


 


 Epoetin Kiran-epbx  10,000 unit  MWF@2100


 SC  3/12/25 21:00


    3/14/25 22:05


10,000 UNIT


 


 Amino Acids  0 ml @ 0


 mls/hr  PER  PHARMACY


 IV  3/10/25 15:00


     





 


 Diagnostic Test


  (Pha)  1 strip  Q6HR


 VI  3/10/25 18:00


    3/15/25 12:09


1 STRIP


 


 Insulin Human


 Regular  FOLLOW


 SLIDING


 SCALE  Q6HR


 SC  3/10/25 18:00


    3/14/25 12:08


2 UNITS


 


 Dextrose  50 ml  UD


 IV  3/10/25 15:30


     





 


 Polyethylene


 Glycol  17 gm  BID


 PO  3/11/25 14:45


    3/15/25 10:19


17 GM


 


 Pantoprazole


 Sodium  40 mg  BID


 IV  3/11/25 22:00


    3/15/25 10:19


40 MG


 


 Amiodarone HCl  200 mg  Q12HR


 PO  3/12/25 22:00


    3/15/25 10:19


200 MG


 


 Guaifenesin/


 Dextromethorphan  10 ml  Q4HP  PRN


 PO  3/12/25 17:30


    3/14/25 02:29


10 ML


 


 Peritoneal


 Dialysis Solution  2,000 ml  0600,1000,1400,1800,2200


 IP  3/13/25 18:00


    3/15/25 11:50


2,000 ML


 


 Lactulose  30 ml  Q12H


 PO  3/14/25 02:00


    3/15/25 02:25


30 ML


 


 Amino Acids/


 Electrolytes/


 Dextrose  2,000 ml @ 


 41 mls/hr  DAILY@2200


 IV  3/14/25 22:00


    3/14/25 22:08


41 MLS/HR








objective


Gen: nad


heent: nc/at, mmm


lungs: cta anteriorly


cvs: no rub


abd: soft, bowel sounds audible


ext: no edema


laboratory and microbiology


                                Laboratory Tests


3/15/25 03:17

















Test


 3/15/25


03:17 Range/Units


 


 


Serum Glucose 141 H   mg/dL








Assessment/Plan


Problem List/Assessment/Plan


ESRD on peritoneal dialysis


Stool impaction


Abdominal pain


septic Shock


AFib with RVR


Hyperkalemia, resolved


Lactic acidosis


Dropping hemoglobin


GI bleeding





Recommendations


Continue supplemental potassium


2.5 L Dianeal exchanges.


We will Continue to follow closely.





Dietary Evaluation Review


Comments:  


Encourage high protein diet. consider Nepro 240ml 19, 432 kcal. PO  


supplements BID


Expected Outcomes/Goals:  


maintain protein levels WNL


Plan discussed with:  Patient











PRAVEENA PEREZ MD           Mar 15, 2025 15:57

## 2025-03-15 NOTE — DVHPN2
Subjective


Overnight events noted.  Patient was currently off of Levophed.  Patient's 

remains on IV vasopressor.


Reviewed:  Care Plan


Changes from previous H/P or p:  No Changes


Eyes:  No Pain, No Vision change, No Conjunctivae inflammation, No Eyelid 

inflammation, No Other, No Redness


ENT:  No Ear pain, No Ear discharge, No Nose pain, No Nose discharge, No Nose 

congestion, No Mouth pain, No Mouth swelling, No Throat pain, No Throat 

swelling, No Other


Cardiovascular:  Chest Pain; No Palpitations, No Orthopnea, No Paroxysmal Noc. 

Dyspnea, No Edema, No Lt Headedness, No Other


Respiratory:  No Cough, No Dry; Shortness of breath; No SOB with excertion, No 

Wheezing, No Hemoptysis, No Pleuritic Pain, No Sputum, No Other


Gastrointestinal:  No Nausea, No Vomiting; Abdominal Pain, Diarrhea; No 

Constipation, No Melena, No Hematochezia, No Other


Genitourinary:  No Dysuria, No Frequency, No Incontinence, No Hematuria, No 

Retention, No Other


Musculoskeletal:  No other, No neck pain, No shoulder pain, No arm pain, No back

pain, No hand pain, No leg pain, No foot pain


Skin:  No Rash, No Lesions, No Jaundice, No Bruising, No Other





Objective


Vitals





Vital Signs








  Date Time  Temp Pulse Resp B/P (MAP) Pulse Ox O2 Delivery O2 Flow Rate FiO2


 


3/15/25 16:10  73 18 108/43    


 


3/15/25 12:00 97.9    100   





 97.9       


 


3/15/25 11:46      Room Air 0.0 


 


3/15/25 11:46        21








Intake/Output











                               Intake and Output 


 


 3/15/25





 07:00


 


Intake Total 4386.772 ml


 


Output Total 6100 ml


 


Balance -1713.228 ml


 


 


 


Intake Oral 100 ml


 


IV Total 2136.772 ml


 


Tube Feeding 50 ml


 


Intraperitoneal 2100 ml


 


Output Urine Total 0 ml


 


Drainage Total 6100 ml








Exam


HEENT pupils are reactive 


Neck is supple 


CV is S1-S2 regular rate and rhythm 


Respiratory diminished breath sounds bases 


GI positive bowel sound


Extremity no edema 


CNS no motor deficits, physical deconditioned


Medications





                               Current Medications








 Medications  Dose


 Ordered  Sig/Anthony


 Route  Start Time


 Stop Time Status Last Admin


Dose Admin


 


 Ondansetron HCl  4 mg  Q4HP  PRN


 IV  3/4/25 17:30


    3/14/25 14:58


4 MG


 


 Morphine Sulfate  2 mg  Q4HPRN  PRN


 IV  3/4/25 17:30


    3/15/25 16:10


2 MG


 


 Morphine Sulfate  2 mg  Q30M  PRN


 IV  3/4/25 17:30


     





 


 Acetaminophen/


 Hydrocodone Bitart  1 tab  Q4HP  PRN


 PO  3/4/25 17:30


    3/15/25 11:00


1 TAB


 


 Acetaminophen  650 mg  Q6HP  PRN


 PO  3/4/25 17:30


    3/15/25 09:30


650 MG


 


 Nitroglycerin  0.4 mg  Q5MINP  PRN


 SL  3/4/25 17:30


     





 


 Hydroxychloroquine


 Sulfate  200 mg  BID


 PO  3/4/25 22:00


    3/15/25 10:19


200 MG


 


 Levothyroxine


 Sodium  100 mcg  DAILY


 PO  3/5/25 10:00


    3/15/25 10:19


100 MCG


 


 Atorvastatin


 Calcium  10 mg  HS


 PO  3/4/25 22:00


    3/14/25 22:05


10 MG


 


 Docusate Sodium  100 mg  BIDP  PRN


 PO  3/4/25 21:00


     





 


 Budesonide  0.5 mg  BID


 NEB  3/4/25 22:00


    3/15/25 07:22


0.5 MG


 


 Midodrine  10 mg  TID@0600,1200,1800


 PO  3/5/25 06:00


    3/15/25 12:09


10 MG


 


 Levalbuterol HCl  0.625 mg  Q6HR


 NEB  3/6/25 06:00


    3/15/25 11:46


0.625 MG


 


 Phenylephrine HCl


 80 mg/Sodium


 Chloride  250 ml @ 


 7.5 mls/hr  Q24H


 IV  3/6/25 15:00


    3/15/25 09:31


33.75 MLS/HR


 


 Vancomycin HCl  0 ml @ 0


 mls/hr  UD


 IV  3/8/25 09:00


     





 


 Meropenem  50 ml @ 17


 mls/hr  DAILY


 IV  3/10/25 10:00


    3/15/25 10:19


17 MLS/HR


 


 Norepinephrine


 Bitartrate 32 mg/


 Sodium Chloride  250 ml @ 


 0.938 mls/


 hr  Q24H


 IV  3/9/25 13:45


    3/14/25 09:27


0.938 MLS/HR


 


 Epoetin Kiran-epbx  10,000 unit  MWF@2100


 SC  3/12/25 21:00


    3/14/25 22:05


10,000 UNIT


 


 Amino Acids  0 ml @ 0


 mls/hr  PER  PHARMACY


 IV  3/10/25 15:00


     





 


 Diagnostic Test


  (Pha)  1 strip  Q6HR


 VI  3/10/25 18:00


    3/15/25 12:09


1 STRIP


 


 Insulin Human


 Regular  FOLLOW


 SLIDING


 SCALE  Q6HR


 SC  3/10/25 18:00


    3/14/25 12:08


2 UNITS


 


 Dextrose  50 ml  UD


 IV  3/10/25 15:30


     





 


 Polyethylene


 Glycol  17 gm  BID


 PO  3/11/25 14:45


    3/15/25 10:19


17 GM


 


 Pantoprazole


 Sodium  40 mg  BID


 IV  3/11/25 22:00


    3/15/25 10:19


40 MG


 


 Amiodarone HCl  200 mg  Q12HR


 PO  3/12/25 22:00


    3/15/25 10:19


200 MG


 


 Guaifenesin/


 Dextromethorphan  10 ml  Q4HP  PRN


 PO  3/12/25 17:30


    3/14/25 02:29


10 ML


 


 Peritoneal


 Dialysis Solution  2,000 ml  0600,1000,1400,1800,2200


 IP  3/13/25 18:00


    3/15/25 11:50


2,000 ML


 


 Lactulose  30 ml  Q12H


 PO  3/14/25 02:00


    3/15/25 02:25


30 ML


 


 Amino Acids/


 Electrolytes/


 Dextrose  2,000 ml @ 


 41 mls/hr  DAILY@2200


 IV  3/14/25 22:00


    3/14/25 22:08


41 MLS/HR











Laboratory Results


Laboratory Tests


3/15/25 03:17











Chemistry








Test


 3/15/25


03:17


 


Albumin


 2.5 g/dL


(3.2-4.8)  L


 


Calcium Level


 7.6 mg/dL


(8.7-10.4)  L


 


Magnesium Level


 1.8 mg/dL


(1.6-2.6)


 


Phosphorus Level


 1.6 mg/dL


(2.4-5.1)  L


 


Total Protein


 4.3 g/dL


(5.7-8.2)  L








LFT








Test


 3/15/25


03:17


 


Alanine Aminotransferase (ALT)


 < 9 U/L (7-40)





 


Alkaline Phosphatase


 102 U/L


()


 


Aspartate Amino Transferase


(AST) 15 U/L (13-40)





 


Total Bilirubin


 < 0.2 mg/dL


(0.2-1.0)  L








Urinalysis








Test


 3/4/25


16:30


 


Urine Color


 Colorless


(Yellow)


 


Urine Clarity Clear (Clear)  


 


Urine pH 7.5 (5.0-9.0)  


 


Urine Specific Gravity


 1.007


(1.001-1.035)


 


Urine Protein


 1+ (Negative)


H


 


Urine Ketones


 Negative


(Negative)


 


Urine Blood


 Negative /uL


(Negative)


 


Urine Nitrite


 Negative


(Negative)


 


Urine Bilirubin


 Negative


(Negative)


 


Urine Urobilinogen


 Normal mg/dL


(Negative)


 


Urine Leukocyte Esterase


 Negative /uL


(Negative)


 


Urine RBC


 6 /hpf (0 - 4)





 


Urine Microscopic WBC 3 /HPF (0-5)  


 


Urine Squamous Epithelial


Cells None seen /hpf


(<5)


 


Urine Bacteria


 None seen /hpf


(None Seen)


 


Urine Mucus


 Few (None


Seen)


 


Urine Glucose


 4+ mg/dL


(Normal)  H








Microbiology





                                  Microbiology








 Date/Time


Source Procedure


Growth Status





 


 3/8/25 16:30


Blood Blood Culture - Final


NO GROWTH AFTER 5 DAYS OF INCUBATION. Complete





 3/6/25 05:30


Peritoneal Fluid Gram Stain - Final


 Complete


 


 3/6/25 05:30


Peritoneal Fluid Body Fluid Culture - Final


 Complete


 


 3/5/25 23:00


Nose MRSA Screen - Final


 Complete











Assessment/Plan


Assessment/Plan


82-year-old female with past medical history of ESRD on PD, AFib diagnosed 

November 21, 2024, chronic abdominal pain, and diarrhea after taking lactulose 

presents after being sent in from urgent care for an abnormal EKG.  Patient 

initially went to urgent care with complaints of chest pain, recent diagnosis of

pneumonia and shortness of breath.  During the emergency department evaluation 

potassium levels found to be 5.7/5.9.  Patient also found to be AFib 130s.  

Patient was recently discharged from [facility] on November 21, 2024 on Eliquis 2.5 and amiodarone 


1. Chest pain MI ruled out


2. Hyperkalemia, improved


3. End-stage renal disease on peritoneal dialysis


4. Hypotension with a relative history of hypertension, today off of vasopressor


5. Chronic abdominal pain


6. Chronic AFib 


7. Hypothyroidism


8. Asthma/COPD 


-continue current meds.


-titrate vasopressor, swallow evaluation and.  Advance diet if tolerated


Nephrology consultation appreciated, cardiology follow up.


Plan discussed with:  Patient, Other





Date of Service:  Mar 15, 2025


Billing Provider:  MARÍA OCONNELL MD


Common Visit Codes:  NOT BILLABLE











MARÍA OCONNELL MD             Mar 15, 2025 16:37

## 2025-03-15 NOTE — DVHPN2
Progress Note - Dictate


Date Seen:  Mar 15, 2025


Medical Necessity Reason


Pt with a Central, PICC or Fol:  Yes


Subjective


No new acute complaints noted at this time. 


Patient is currently on 2 pressors. Levophed is decreasing slightly. 











vital signs





                                   Vital Sign








  Date Time  Temp Pulse Resp B/P (MAP) Pulse Ox O2 Delivery O2 Flow Rate FiO2


 


3/15/25 07:21     100 Room Air* 0 21


 


3/15/25 07:21  82 14     


 


3/15/25 04:45    114/49 (70)    


 


3/15/25 04:00 98.7       





 98.7       














                           Total Intake and Output   


 


 3/14/25 3/14/25 3/15/25





 15:00 23:00 07:00


 


Intake Total 701.047 ml 2684.625 ml 50 ml


 


Output Total 2400 ml 2800 ml 900 ml


 


Balance -1698.953 ml -115.375 ml -850 ml








medications





                               Current Medications








 Medications  Dose


 Ordered  Sig/Anthony


 Route  Start Time


 Stop Time Status Last Admin


Dose Admin


 


 Ondansetron HCl  4 mg  Q4HP  PRN


 IV  3/4/25 17:30


    3/14/25 14:58


4 MG


 


 Morphine Sulfate  2 mg  Q4HPRN  PRN


 IV  3/4/25 17:30


    3/12/25 23:43


2 MG


 


 Morphine Sulfate  2 mg  Q30M  PRN


 IV  3/4/25 17:30


     





 


 Acetaminophen/


 Hydrocodone Bitart  1 tab  Q4HP  PRN


 PO  3/4/25 17:30


    3/13/25 22:23


1 TAB


 


 Acetaminophen  650 mg  Q6HP  PRN


 PO  3/4/25 17:30


    3/10/25 09:57


650 MG


 


 Nitroglycerin  0.4 mg  Q5MINP  PRN


 SL  3/4/25 17:30


     





 


 Hydroxychloroquine


 Sulfate  200 mg  BID


 PO  3/4/25 22:00


    3/14/25 22:05


200 MG


 


 Levothyroxine


 Sodium  100 mcg  DAILY


 PO  3/5/25 10:00


    3/14/25 11:56


100 MCG


 


 Atorvastatin


 Calcium  10 mg  HS


 PO  3/4/25 22:00


    3/14/25 22:05


10 MG


 


 Docusate Sodium  100 mg  BIDP  PRN


 PO  3/4/25 21:00


     





 


 Budesonide  0.5 mg  BID


 NEB  3/4/25 22:00


    3/15/25 07:22


0.5 MG


 


 Midodrine  10 mg  TID@0600,1200,1800


 PO  3/5/25 06:00


    3/15/25 06:00


10 MG


 


 Levalbuterol HCl  0.625 mg  Q6HR


 NEB  3/6/25 06:00


    3/15/25 07:21


0.625 MG


 


 Phenylephrine HCl


 80 mg/Sodium


 Chloride  250 ml @ 


 7.5 mls/hr  Q24H


 IV  3/6/25 15:00


    3/15/25 01:51


33.75 MLS/HR


 


 Vancomycin HCl  0 ml @ 0


 mls/hr  UD


 IV  3/8/25 09:00


     





 


 Meropenem  50 ml @ 17


 mls/hr  DAILY


 IV  3/10/25 10:00


    3/14/25 11:56


17 MLS/HR


 


 Norepinephrine


 Bitartrate 32 mg/


 Sodium Chloride  250 ml @ 


 0.938 mls/


 hr  Q24H


 IV  3/9/25 13:45


    3/14/25 09:27


0.938 MLS/HR


 


 Epoetin Kiran-epbx  10,000 unit  MWF@2100


 SC  3/12/25 21:00


    3/14/25 22:05


10,000 UNIT


 


 Amino Acids  0 ml @ 0


 mls/hr  PER  PHARMACY


 IV  3/10/25 15:00


     





 


 Diagnostic Test


  (Pha)  1 strip  Q6HR


 VI  3/10/25 18:00


    3/15/25 06:02


1 STRIP


 


 Insulin Human


 Regular  FOLLOW


 SLIDING


 SCALE  Q6HR


 SC  3/10/25 18:00


    3/14/25 12:08


2 UNITS


 


 Dextrose  50 ml  UD


 IV  3/10/25 15:30


     





 


 Polyethylene


 Glycol  17 gm  BID


 PO  3/11/25 14:45


    3/14/25 22:06


17 GM


 


 Pantoprazole


 Sodium  40 mg  BID


 IV  3/11/25 22:00


    3/14/25 22:07


40 MG


 


 Epoetin Kiran-epbx  10,000 unit  ONCE@2100


 SC  3/12/25 21:00


    3/13/25 22:22


10,000 UNIT


 


 Amiodarone HCl  200 mg  Q12HR


 PO  3/12/25 22:00


    3/14/25 22:05


200 MG


 


 Guaifenesin/


 Dextromethorphan  10 ml  Q4HP  PRN


 PO  3/12/25 17:30


    3/14/25 02:29


10 ML


 


 Peritoneal


 Dialysis Solution  2,000 ml  0600,1000,1400,1800,2200


 IP  3/13/25 18:00


    3/15/25 06:03


2,000 ML


 


 Lactulose  30 ml  Q12H


 PO  3/14/25 02:00


    3/15/25 02:25


30 ML


 


 Amino Acids/


 Electrolytes/


 Dextrose  2,000 ml @ 


 41 mls/hr  DAILY@2200


 IV  3/13/25 22:00


    3/13/25 22:21


41 MLS/HR


 


 Amino Acids/


 Electrolytes/


 Dextrose  2,000 ml @ 


 41 mls/hr  DAILY@2200


 IV  3/14/25 22:00


    3/14/25 22:08


41 MLS/HR








objective


General Appearance:  Alert, , mild distress


HEENT:  Atraumatic, PERRLA, EOMI


Respiratory:  Clear to auscultation, Normal air movement


Cardiovascular:  Normal S1, Normal S2, Other (afib)


Abdominal:  non tender. 


Extremities:  No clubbing, No cyanosis, Normal pulses ,edema +


Skin:  No rashes, No breakdown


Neuro:  grossly intact


laboratory and microbiology


                                Laboratory Tests


3/15/25 03:17

















Test


 3/15/25


03:17 Range/Units


 


 


Serum Glucose 141 H   mg/dL








Assessment/Plan


Patient is a 82-year-old female presents to the hospital with:





Hypotension/Shock


Lactic Acidosis 


Afib uncontrolled


Chest pain


Hyperkalemia


ESRD on PD


Chronic abdominal pain


Diarrhea 





Recommendations:


Patient is still on 2 pressors, however slightly decreasing Levophed use.








Continue Vancomycin/ Meropenem empirically


Pulm/ crit care on board


follow blood culture: no growth


no clear source of infection





hypotension is likely multifactorial cardiac due to Afib with rvr vs medication 

induced/ acidosis





s/p  Peritoneal fluid analysis: cell count not qualifying for peritonitis. 

cultures are also negative 








Antibiotic status:


Meropenem IV [Restarted on 03/10]


Vancomycin IV [Started on 03/05 - 03/08]





03/07, Vancomycin Random is 20.2. 





03/04, Abdomen Pelvis CT showed No acute intra-abdominal finding. Isodense 

lesion of the right mid kidney measuring 1.2 cm, attention on follow-up is 

recommended.  


Compression fracture of L2 and possibly superior endplate of L1 with mild 

posterior extension resulting in mild spinal canal stenosis, age-indeterminate  





03/04, Blood culture showed no growth





03/06, Fluid culture preliminary showed no growth





03/05, MRSA screening negative





critical care time 35 minutes which includes assessment, coordinating plan with 

nurse and consultants. 





prognosis poor


plan discussed with RN





Thank you for consult.





Dietary Evaluation Review


Comments:  


Encourage high protein diet. consider Nepro 240ml 19, 432 kcal. PO  


supplements BID


Expected Outcomes/Goals:  


maintain protein levels WNL











GELA ELLISON MD            Mar 15, 2025 07:35

## 2025-03-16 NOTE — DVHPN2
Progress Note - Dictate


Date Seen:  Mar 16, 2025


Medical Necessity Reason


Pt with a Central, PICC or Fol:  Yes


Subjective


Patient seen and examined at bedside.


Overnight events reviewed.


vital signs





                                   Vital Sign








  Date Time  Temp Pulse Resp B/P (MAP) Pulse Ox O2 Delivery O2 Flow Rate FiO2


 


3/16/25 07:00  69 9 99/48 (65) 100   


 


3/16/25 06:02      Room Air* 0 21


 


3/16/25 04:00 97.5       





 97.5       














                           Total Intake and Output   


 


 3/15/25 3/15/25 3/16/25





 15:00 23:00 07:00


 


Intake Total 278.063 ml 4561.064 ml 575.504 ml


 


Output Total   200 ml


 


Balance 278.063 ml 4561.064 ml 375.504 ml








medications





                               Current Medications








 Medications  Dose


 Ordered  Sig/Anthony


 Route  Start Time


 Stop Time Status Last Admin


Dose Admin


 


 Ondansetron HCl  4 mg  Q4HP  PRN


 IV  3/4/25 17:30


    3/14/25 14:58


4 MG


 


 Morphine Sulfate  2 mg  Q4HPRN  PRN


 IV  3/4/25 17:30


    3/16/25 04:09


2 MG


 


 Morphine Sulfate  2 mg  Q30M  PRN


 IV  3/4/25 17:30


     





 


 Acetaminophen/


 Hydrocodone Bitart  1 tab  Q4HP  PRN


 PO  3/4/25 17:30


    3/15/25 11:00


1 TAB


 


 Acetaminophen  650 mg  Q6HP  PRN


 PO  3/4/25 17:30


    3/15/25 09:30


650 MG


 


 Nitroglycerin  0.4 mg  Q5MINP  PRN


 SL  3/4/25 17:30


     





 


 Hydroxychloroquine


 Sulfate  200 mg  BID


 PO  3/4/25 22:00


    3/16/25 09:31


200 MG


 


 Levothyroxine


 Sodium  100 mcg  DAILY


 PO  3/5/25 10:00


    3/16/25 09:31


100 MCG


 


 Atorvastatin


 Calcium  10 mg  HS


 PO  3/4/25 22:00


    3/15/25 22:40


10 MG


 


 Docusate Sodium  100 mg  BIDP  PRN


 PO  3/4/25 21:00


     





 


 Budesonide  0.5 mg  BID


 NEB  3/4/25 22:00


    3/16/25 06:02


0.5 MG


 


 Midodrine  10 mg  TID@0600,1200,1800


 PO  3/5/25 06:00


    3/16/25 06:10


10 MG


 


 Levalbuterol HCl  0.625 mg  Q6HR


 NEB  3/6/25 06:00


    3/16/25 06:02


0.625 MG


 


 Phenylephrine HCl


 80 mg/Sodium


 Chloride  250 ml @ 


 7.5 mls/hr  Q24H


 IV  3/6/25 15:00


    3/15/25 20:52


15 MLS/HR


 


 Vancomycin HCl  0 ml @ 0


 mls/hr  UD


 IV  3/8/25 09:00


     





 


 Meropenem  50 ml @ 17


 mls/hr  DAILY


 IV  3/10/25 10:00


    3/16/25 09:31


17 MLS/HR


 


 Norepinephrine


 Bitartrate 32 mg/


 Sodium Chloride  250 ml @ 


 0.938 mls/


 hr  Q24H


 IV  3/9/25 13:45


    3/14/25 09:27


0.938 MLS/HR


 


 Epoetin Kiran-epbx  10,000 unit  MWF@2100


 SC  3/12/25 21:00


    3/14/25 22:05


10,000 UNIT


 


 Amino Acids  0 ml @ 0


 mls/hr  PER  PHARMACY


 IV  3/10/25 15:00


     





 


 Diagnostic Test


  (Pha)  1 strip  Q6HR


 VI  3/10/25 18:00


    3/16/25 06:07


1 STRIP


 


 Insulin Human


 Regular  FOLLOW


 SLIDING


 SCALE  Q6HR


 SC  3/10/25 18:00


    3/14/25 12:08


2 UNITS


 


 Dextrose  50 ml  UD


 IV  3/10/25 15:30


     





 


 Polyethylene


 Glycol  17 gm  BID


 PO  3/11/25 14:45


    3/15/25 22:39


17 GM


 


 Pantoprazole


 Sodium  40 mg  BID


 IV  3/11/25 22:00


    3/16/25 09:31


40 MG


 


 Amiodarone HCl  200 mg  Q12HR


 PO  3/12/25 22:00


    3/16/25 09:31


200 MG


 


 Guaifenesin/


 Dextromethorphan  10 ml  Q4HP  PRN


 PO  3/12/25 17:30


    3/14/25 02:29


10 ML


 


 Peritoneal


 Dialysis Solution  2,000 ml  0600,1000,1400,1800,2200


 IP  3/13/25 18:00


    3/16/25 06:06


2,000 ML


 


 Lactulose  30 ml  Q12H


 PO  3/14/25 02:00


    3/16/25 02:23


30 ML


 


 Amino Acids/


 Electrolytes/


 Dextrose  2,000 ml @ 


 41 mls/hr  DAILY@2200


 IV  3/14/25 22:00


    3/15/25 22:41


41 MLS/HR


 


 Hydrocortisone


 Sodium Succinate  50 mg  Q6HR


 IV  3/16/25 00:00


    3/16/25 06:10


50 MG


 


 Sodium Chloride  1 gm  TID


 PO  3/16/25 14:00


   UNV  











laboratory and microbiology


                                Laboratory Tests


3/16/25 03:05

















Test


 3/16/25


03:05 Range/Units


 


 


Serum Glucose 130 H   mg/dL








Assessment/Plan


Impression


Acute hypoxemic respiratory failure


Lactic acidosis 


Septic shock 


Pneumonia





Patient seen and examined in ICU





Events


Low oxygen requirements


On room air 


remains on Iam-synephrine drip 


 started on stress dose steroids


PD in progress











Labs and imaging reviewed  


Chest x-ray shows left basilar atelectasis 





Management


Supplemental oxygen as needed


Titrate to maintain sats 90% or above  


 


Incentive spirometry





Continue antibiotics


F/u cultures 





Bronchodilators





Stress dose steroids 


Hydrocortisone 50mg IV q6H





Monitor renal function


F/u nephrology, management deferred 





Monitor electrolytes


Supplement as needed  





Pressors as needed for hemodynamic support


To maintain a mean arterial pressure of 65mmHg 





DVT prophylaxis





Critical care time 35 minutes





Dietary Evaluation Review


Comments:  


Encourage high protein diet. consider Nepro 240ml 19, 432 kcal. PO  


supplements BID


Expected Outcomes/Goals:  


maintain protein levels WNL


Plan discussed with:  Other (rn)











CLAIRE CHRISTY MD            Mar 16, 2025 10:51

## 2025-03-16 NOTE — DVHPN2
Progress Note - Dictate


Date Seen:  Mar 16, 2025


Medical Necessity Reason


Pt with a Central, PICC or Fol:  Yes


Subjective


Resting comfortably this morning


vital signs





                                   Vital Sign








  Date Time  Temp Pulse Resp B/P (MAP) Pulse Ox O2 Delivery O2 Flow Rate FiO2


 


3/16/25 07:00  69 9 99/48 (65) 100   


 


3/16/25 06:02      Room Air* 0 21


 


3/16/25 04:00 97.5       





 97.5       














                           Total Intake and Output   


 


 3/15/25 3/15/25 3/16/25





 15:00 23:00 07:00


 


Intake Total 278.063 ml 4561.064 ml 575.504 ml


 


Output Total   200 ml


 


Balance 278.063 ml 4561.064 ml 375.504 ml








medications





                               Current Medications








 Medications  Dose


 Ordered  Sig/Anthony


 Route  Start Time


 Stop Time Status Last Admin


Dose Admin


 


 Ondansetron HCl  4 mg  Q4HP  PRN


 IV  3/4/25 17:30


    3/14/25 14:58


4 MG


 


 Morphine Sulfate  2 mg  Q4HPRN  PRN


 IV  3/4/25 17:30


    3/16/25 04:09


2 MG


 


 Morphine Sulfate  2 mg  Q30M  PRN


 IV  3/4/25 17:30


     





 


 Acetaminophen/


 Hydrocodone Bitart  1 tab  Q4HP  PRN


 PO  3/4/25 17:30


    3/15/25 11:00


1 TAB


 


 Acetaminophen  650 mg  Q6HP  PRN


 PO  3/4/25 17:30


    3/15/25 09:30


650 MG


 


 Nitroglycerin  0.4 mg  Q5MINP  PRN


 SL  3/4/25 17:30


     





 


 Hydroxychloroquine


 Sulfate  200 mg  BID


 PO  3/4/25 22:00


    3/16/25 09:31


200 MG


 


 Levothyroxine


 Sodium  100 mcg  DAILY


 PO  3/5/25 10:00


    3/16/25 09:31


100 MCG


 


 Atorvastatin


 Calcium  10 mg  HS


 PO  3/4/25 22:00


    3/15/25 22:40


10 MG


 


 Docusate Sodium  100 mg  BIDP  PRN


 PO  3/4/25 21:00


     





 


 Budesonide  0.5 mg  BID


 NEB  3/4/25 22:00


    3/16/25 06:02


0.5 MG


 


 Midodrine  10 mg  TID@0600,1200,1800


 PO  3/5/25 06:00


    3/16/25 06:10


10 MG


 


 Levalbuterol HCl  0.625 mg  Q6HR


 NEB  3/6/25 06:00


    3/16/25 06:02


0.625 MG


 


 Phenylephrine HCl


 80 mg/Sodium


 Chloride  250 ml @ 


 7.5 mls/hr  Q24H


 IV  3/6/25 15:00


    3/15/25 20:52


15 MLS/HR


 


 Vancomycin HCl  0 ml @ 0


 mls/hr  UD


 IV  3/8/25 09:00


     





 


 Meropenem  50 ml @ 17


 mls/hr  DAILY


 IV  3/10/25 10:00


    3/16/25 09:31


17 MLS/HR


 


 Norepinephrine


 Bitartrate 32 mg/


 Sodium Chloride  250 ml @ 


 0.938 mls/


 hr  Q24H


 IV  3/9/25 13:45


    3/14/25 09:27


0.938 MLS/HR


 


 Epoetin Kiran-epbx  10,000 unit  MWF@2100


 SC  3/12/25 21:00


    3/14/25 22:05


10,000 UNIT


 


 Amino Acids  0 ml @ 0


 mls/hr  PER  PHARMACY


 IV  3/10/25 15:00


     





 


 Diagnostic Test


  (Pha)  1 strip  Q6HR


 VI  3/10/25 18:00


    3/16/25 06:07


1 STRIP


 


 Insulin Human


 Regular  FOLLOW


 SLIDING


 SCALE  Q6HR


 SC  3/10/25 18:00


    3/14/25 12:08


2 UNITS


 


 Dextrose  50 ml  UD


 IV  3/10/25 15:30


     





 


 Polyethylene


 Glycol  17 gm  BID


 PO  3/11/25 14:45


    3/15/25 22:39


17 GM


 


 Pantoprazole


 Sodium  40 mg  BID


 IV  3/11/25 22:00


    3/16/25 09:31


40 MG


 


 Amiodarone HCl  200 mg  Q12HR


 PO  3/12/25 22:00


    3/16/25 09:31


200 MG


 


 Guaifenesin/


 Dextromethorphan  10 ml  Q4HP  PRN


 PO  3/12/25 17:30


    3/14/25 02:29


10 ML


 


 Peritoneal


 Dialysis Solution  2,000 ml  0600,1000,1400,1800,2200


 IP  3/13/25 18:00


    3/16/25 06:06


2,000 ML


 


 Lactulose  30 ml  Q12H


 PO  3/14/25 02:00


    3/16/25 02:23


30 ML


 


 Amino Acids/


 Electrolytes/


 Dextrose  2,000 ml @ 


 41 mls/hr  DAILY@2200


 IV  3/14/25 22:00


    3/15/25 22:41


41 MLS/HR


 


 Hydrocortisone


 Sodium Succinate  50 mg  Q6HR


 IV  3/16/25 00:00


    3/16/25 06:10


50 MG








objective


Gen: nad


heent: nc/at, mmm


lungs: cta anteriorly


cvs: no rub


abd: soft, bowel sounds audible


ext: no edema


laboratory and microbiology


                                Laboratory Tests


3/16/25 03:05

















Test


 3/16/25


03:05 Range/Units


 


 


Serum Glucose 130 H   mg/dL








Assessment/Plan


Problem List/Assessment/Plan


ESRD on peritoneal dialysis


Stool impaction


Abdominal pain


septic Shock


AFib with RVR


Hyperkalemia, resolved


Lactic acidosis


Dropping hemoglobin


GI bleeding





Recommendations


- supplemental potassium as needed


- we will continue with 2.5% Dineal exchanges





Dietary Evaluation Review


Comments:  


Encourage high protein diet. consider Nepro 240ml 19, 432 kcal. PO  


supplements BID


Expected Outcomes/Goals:  


maintain protein levels WNL


Plan discussed with:  Other











PRAVEENA PEERZ MD           Mar 16, 2025 10:22

## 2025-03-16 NOTE — DVH
Bilateral lower extremity venous duplex



Clinical History: swelling/tenderness to lower extremities



Comparison: None



Technique: 



Duplex Doppler evaluation of the deep venous systems of both lower extremities from the common femora
l veins to the popliteal veins including color Doppler and spectral/pulsed waveform analysis was perf
ormed.



Findings: 



RIGHT SIDE: 



The common femoral vein demonstrates appropriate compressibility and waveform variability.



There is compressibility/patency of the great saphenous vein at the proximal thigh.



The femoral vein demonstrates appropriate compressibility and waveform variability.



The deep femoral vein demonstrates appropriate compressibility and waveform variability.



The popliteal vein demonstrates appropriate compressibility and waveform variability.



There is normal compressibility at the tibioperoneal trunk.



LEFT SIDE: 



The common femoral vein demonstrates appropriate compressibility and waveform variability.



There is compressibility/patency of the great saphenous vein at the proximal thigh.



The femoral vein demonstrates appropriate compressibility and waveform variability.



The deep femoral vein demonstrates appropriate compressibility and waveform variability.



The popliteal vein demonstrates appropriate compressibility and waveform variability.



There is normal compressibility at the tibioperoneal trunk.



Impression: 



No right or left femoropopliteal venous thrombosis.



Electronically Signed by: Abdirahman Freeman at 03/16/2025 17:05:15 PM

## 2025-03-16 NOTE — DVHPN2
Subjective


Overnight events noted.  Patient was remains on Iam-Synephrine at 65 mics


Reviewed:  Care Plan


Changes from previous H/P or p:  No Changes


Eyes:  No Pain, No Vision change, No Conjunctivae inflammation, No Eyelid 

inflammation, No Other, No Redness


ENT:  No Ear pain, No Ear discharge, No Nose pain, No Nose discharge, No Nose 

congestion, No Mouth pain, No Mouth swelling, No Throat pain, No Throat 

swelling, No Other


Cardiovascular:  Chest Pain; No Palpitations, No Orthopnea, No Paroxysmal Noc. 

Dyspnea, No Edema, No Lt Headedness, No Other


Respiratory:  No Cough, No Dry; Shortness of breath; No SOB with excertion, No 

Wheezing, No Hemoptysis, No Pleuritic Pain, No Sputum, No Other


Gastrointestinal:  No Nausea, No Vomiting; Abdominal Pain, Diarrhea; No 

Constipation, No Melena, No Hematochezia, No Other


Genitourinary:  No Dysuria, No Frequency, No Incontinence, No Hematuria, No 

Retention, No Other


Musculoskeletal:  No other, No neck pain, No shoulder pain, No arm pain, No back

pain, No hand pain, No leg pain, No foot pain


Skin:  No Rash, No Lesions, No Jaundice, No Bruising, No Other





Objective


Vitals





Vital Signs








  Date Time  Temp Pulse Resp B/P (MAP) Pulse Ox O2 Delivery O2 Flow Rate FiO2


 


3/16/25 16:00  73      


 


3/16/25 15:45   16 110/47 (68) 98   


 


3/16/25 12:09      Room Air* 0 21


 


3/16/25 12:00 97.7       





 97.7       








Intake/Output











                               Intake and Output 


 


 3/16/25





 07:00


 


Intake Total 5414.631 ml


 


Output Total 200 ml


 


Balance 5214.631 ml


 


 


 


Intake Oral 490 ml


 


IV Total 924.631 ml


 


Intraperitoneal 4000 ml


 


Output Urine Total 0 ml


 


Other 200 ml


 


# Bowel Movements 1








Exam


HEENT pupils are reactive 


Neck is supple 


CV is S1-S2 regular rate and rhythm 


Respiratory diminished breath sounds bases 


GI positive bowel sound


Extremity bilateral pitting edema right more than


CNS no motor deficits, physical deconditioned


Medications





                               Current Medications








 Medications  Dose


 Ordered  Sig/Anthony


 Route  Start Time


 Stop Time Status Last Admin


Dose Admin


 


 Ondansetron HCl  4 mg  Q4HP  PRN


 IV  3/4/25 17:30


    3/14/25 14:58


4 MG


 


 Morphine Sulfate  2 mg  Q4HPRN  PRN


 IV  3/4/25 17:30


    3/16/25 04:09


2 MG


 


 Morphine Sulfate  2 mg  Q30M  PRN


 IV  3/4/25 17:30


     





 


 Acetaminophen/


 Hydrocodone Bitart  1 tab  Q4HP  PRN


 PO  3/4/25 17:30


    3/15/25 11:00


1 TAB


 


 Acetaminophen  650 mg  Q6HP  PRN


 PO  3/4/25 17:30


    3/15/25 09:30


650 MG


 


 Nitroglycerin  0.4 mg  Q5MINP  PRN


 SL  3/4/25 17:30


     





 


 Hydroxychloroquine


 Sulfate  200 mg  BID


 PO  3/4/25 22:00


    3/16/25 09:31


200 MG


 


 Levothyroxine


 Sodium  100 mcg  DAILY


 PO  3/5/25 10:00


    3/16/25 09:31


100 MCG


 


 Atorvastatin


 Calcium  10 mg  HS


 PO  3/4/25 22:00


    3/15/25 22:40


10 MG


 


 Docusate Sodium  100 mg  BIDP  PRN


 PO  3/4/25 21:00


     





 


 Budesonide  0.5 mg  BID


 NEB  3/4/25 22:00


    3/16/25 06:02


0.5 MG


 


 Midodrine  10 mg  TID@0600,1200,1800


 PO  3/5/25 06:00


    3/16/25 12:39


10 MG


 


 Levalbuterol HCl  0.625 mg  Q6HR


 NEB  3/6/25 06:00


    3/16/25 12:09


0.625 MG


 


 Phenylephrine HCl


 80 mg/Sodium


 Chloride  250 ml @ 


 7.5 mls/hr  Q24H


 IV  3/6/25 15:00


    3/16/25 16:01


15 MLS/HR


 


 Vancomycin HCl  0 ml @ 0


 mls/hr  UD


 IV  3/8/25 09:00


     





 


 Meropenem  50 ml @ 17


 mls/hr  DAILY


 IV  3/10/25 10:00


    3/16/25 09:31


17 MLS/HR


 


 Norepinephrine


 Bitartrate 32 mg/


 Sodium Chloride  250 ml @ 


 0.938 mls/


 hr  Q24H


 IV  3/9/25 13:45


    3/14/25 09:27


0.938 MLS/HR


 


 Epoetin Kiran-epbx  10,000 unit  MWF@2100


 SC  3/12/25 21:00


    3/14/25 22:05


10,000 UNIT


 


 Amino Acids  0 ml @ 0


 mls/hr  PER  PHARMACY


 IV  3/10/25 15:00


     





 


 Diagnostic Test


  (Pha)  1 strip  Q6HR


 VI  3/10/25 18:00


    3/16/25 12:00


1 STRIP


 


 Insulin Human


 Regular  FOLLOW


 SLIDING


 SCALE  Q6HR


 SC  3/10/25 18:00


    3/14/25 12:08


2 UNITS


 


 Dextrose  50 ml  UD


 IV  3/10/25 15:30


     





 


 Polyethylene


 Glycol  17 gm  BID


 PO  3/11/25 14:45


    3/15/25 22:39


17 GM


 


 Pantoprazole


 Sodium  40 mg  BID


 IV  3/11/25 22:00


    3/16/25 09:31


40 MG


 


 Amiodarone HCl  200 mg  Q12HR


 PO  3/12/25 22:00


    3/16/25 09:31


200 MG


 


 Guaifenesin/


 Dextromethorphan  10 ml  Q4HP  PRN


 PO  3/12/25 17:30


    3/14/25 02:29


10 ML


 


 Peritoneal


 Dialysis Solution  2,000 ml  0600,1000,1400,1800,2200


 IP  3/13/25 18:00


    3/16/25 13:05


2,000 ML


 


 Lactulose  30 ml  Q12H


 PO  3/14/25 02:00


    3/16/25 02:23


30 ML


 


 Hydrocortisone


 Sodium Succinate  50 mg  Q6HR


 IV  3/16/25 00:00


    3/16/25 12:39


50 MG


 


 Sodium Chloride  1 gm  TID


 PO  3/16/25 14:00


    3/16/25 16:01


1 GM


 


 Amino Acids/


 Electrolytes/


 Dextrose  1,000 ml @ 


 41.667 mls/


 hr  DAILY@2200


 IV  3/16/25 22:00


     














Laboratory Results


Laboratory Tests


3/16/25 03:05











Chemistry








Test


 3/16/25


03:05


 


Albumin


 2.6 g/dL


(3.2-4.8)  L


 


Calcium Level


 8.0 mg/dL


(8.7-10.4)  L


 


Magnesium Level


 1.6 mg/dL


(1.6-2.6)


 


Phosphorus Level


 2.0 mg/dL


(2.4-5.1)  L


 


Total Protein


 4.3 g/dL


(5.7-8.2)  L








LFT








Test


 3/16/25


03:05


 


Alanine Aminotransferase (ALT) 9 U/L (7-40)  


 


Alkaline Phosphatase


 124 U/L


()  H


 


Aspartate Amino Transferase


(AST) 20 U/L (13-40)





 


Total Bilirubin


 < 0.2 mg/dL


(0.2-1.0)  L








Urinalysis








Test


 3/4/25


16:30


 


Urine Color


 Colorless


(Yellow)


 


Urine Clarity Clear (Clear)  


 


Urine pH 7.5 (5.0-9.0)  


 


Urine Specific Gravity


 1.007


(1.001-1.035)


 


Urine Protein


 1+ (Negative)


H


 


Urine Ketones


 Negative


(Negative)


 


Urine Blood


 Negative /uL


(Negative)


 


Urine Nitrite


 Negative


(Negative)


 


Urine Bilirubin


 Negative


(Negative)


 


Urine Urobilinogen


 Normal mg/dL


(Negative)


 


Urine Leukocyte Esterase


 Negative /uL


(Negative)


 


Urine RBC


 6 /hpf (0 - 4)





 


Urine Microscopic WBC 3 /HPF (0-5)  


 


Urine Squamous Epithelial


Cells None seen /hpf


(<5)


 


Urine Bacteria


 None seen /hpf


(None Seen)


 


Urine Mucus


 Few (None


Seen)


 


Urine Glucose


 4+ mg/dL


(Normal)  H








Microbiology





                                  Microbiology








 Date/Time


Source Procedure


Growth Status





 


 3/8/25 16:30


Blood Blood Culture - Final


NO GROWTH AFTER 5 DAYS OF INCUBATION. Complete





 3/6/25 05:30


Peritoneal Fluid Gram Stain - Final


 Complete


 


 3/6/25 05:30


Peritoneal Fluid Body Fluid Culture - Final


 Complete


 


 3/5/25 23:00


Nose MRSA Screen - Final


 Complete











Assessment/Plan


Assessment/Plan


82-year-old female with past medical history of ESRD on PD, AFib diagnosed 

November 21, 2024, chronic abdominal pain, and diarrhea after taking lactulose 

presents after being sent in from urgent care for an abnormal EKG.  Patient 

initially went to urgent care with complaints of chest pain, recent diagnosis of

pneumonia and shortness of breath.  During the emergency department evaluation 

potassium levels found to be 5.7/5.9.  Patient also found to be AFib 130s.  

Patient was recently discharged from [facility] on November 21, 2024 on Eliquis 2.5 and amiodarone 


1. Chest pain MI ruled out


2. Hyperkalemia, improved


3. End-stage renal disease on peritoneal dialysis


4. Hypotension with a relative history of hypertension, today off of vasopressor


5. Chronic abdominal pain


6. Chronic AFib 


7. Hypothyroidism


8. Asthma/COPD 


-continue current meds.  Diet as tolerated


-titrate vasopressor, swallow evaluation and.  Advance diet if tolerated


Nephrology consultation appreciated, cardiology follow up.


Plan discussed with:  Patient, Daughter


My Orders





                           Orders - MARÍA OCONNELL MD








Procedure Category Date Status





  Time 


 


Bilat Lower Dvt US 3/16/25 Resulted





  14:37 











Date of Service:  Mar 16, 2025


Billing Provider:  MARÍA OCONNELL MD


Common Visit Codes:  NOT BILLABLE











MARÍA OCONNELL MD             Mar 16, 2025 17:32

## 2025-03-17 NOTE — DVHPN2
Progress Note - Dictate


Date Seen:  Mar 17, 2025


Medical Necessity Reason


Pt with a Central, PICC or Fol:  Yes


Subjective


Patient seen and examined at bedside.


Breathing comfortably on room air


Overnight events reviewed.


vital signs





                                   Vital Sign








  Date Time  Temp Pulse Resp B/P (MAP) Pulse Ox O2 Delivery O2 Flow Rate FiO2


 


3/17/25 19:14  74 16  100   


 


3/17/25 19:04      Room Air 0.0 


 


3/17/25 19:04        21


 


3/17/25 18:45    104/42 (62)    


 


3/17/25 16:00 97.6       





 97.6       














                           Total Intake and Output   


 


 3/16/25 3/16/25 3/17/25





 15:00 23:00 07:00


 


Intake Total 2402.816 ml 2877.5 ml 608.325 ml


 


Output Total 300 ml 400 ml 600 ml


 


Balance 2102.816 ml 2477.5 ml 8.325 ml








medications





                               Current Medications








 Medications  Dose


 Ordered  Sig/Anthony


 Route  Start Time


 Stop Time Status Last Admin


Dose Admin


 


 Ondansetron HCl  4 mg  Q4HP  PRN


 IV  3/4/25 17:30


    3/14/25 14:58


4 MG


 


 Morphine Sulfate  2 mg  Q4HPRN  PRN


 IV  3/4/25 17:30


    3/17/25 03:02


2 MG


 


 Morphine Sulfate  2 mg  Q30M  PRN


 IV  3/4/25 17:30


     





 


 Acetaminophen/


 Hydrocodone Bitart  1 tab  Q4HP  PRN


 PO  3/4/25 17:30


    3/15/25 11:00


1 TAB


 


 Acetaminophen  650 mg  Q6HP  PRN


 PO  3/4/25 17:30


    3/15/25 09:30


650 MG


 


 Nitroglycerin  0.4 mg  Q5MINP  PRN


 SL  3/4/25 17:30


     





 


 Hydroxychloroquine


 Sulfate  200 mg  BID


 PO  3/4/25 22:00


    3/17/25 10:51


200 MG


 


 Levothyroxine


 Sodium  100 mcg  DAILY


 PO  3/5/25 10:00


    3/17/25 10:52


100 MCG


 


 Atorvastatin


 Calcium  10 mg  HS


 PO  3/4/25 22:00


    3/16/25 22:17


10 MG


 


 Docusate Sodium  100 mg  BIDP  PRN


 PO  3/4/25 21:00


     





 


 Budesonide  0.5 mg  BID


 NEB  3/4/25 22:00


    3/17/25 19:04


0.5 MG


 


 Midodrine  10 mg  TID@0600,1200,1800


 PO  3/5/25 06:00


    3/17/25 17:56


10 MG


 


 Levalbuterol HCl  0.625 mg  Q6HR


 NEB  3/6/25 06:00


    3/17/25 19:04


0.625 MG


 


 Phenylephrine HCl


 80 mg/Sodium


 Chloride  250 ml @ 


 7.5 mls/hr  Q24H


 IV  3/6/25 15:00


    3/16/25 16:01


15 MLS/HR


 


 Vancomycin HCl  0 ml @ 0


 mls/hr  UD


 IV  3/8/25 09:00


     





 


 Meropenem  50 ml @ 17


 mls/hr  DAILY


 IV  3/10/25 10:00


    3/17/25 10:52


17 MLS/HR


 


 Norepinephrine


 Bitartrate 32 mg/


 Sodium Chloride  250 ml @ 


 0.938 mls/


 hr  Q24H


 IV  3/9/25 13:45


    3/14/25 09:27


0.938 MLS/HR


 


 Epoetin Kiran-epbx  10,000 unit  MWF@2100


 SC  3/12/25 21:00


    3/14/25 22:05


10,000 UNIT


 


 Amino Acids  0 ml @ 0


 mls/hr  PER  PHARMACY


 IV  3/10/25 15:00


     





 


 Diagnostic Test


  (Pha)  1 strip  Q6HR


 VI  3/10/25 18:00


    3/17/25 17:57


1 STRIP


 


 Insulin Human


 Regular  FOLLOW


 SLIDING


 SCALE  Q6HR


 SC  3/10/25 18:00


    3/17/25 06:21


2 UNITS


 


 Dextrose  50 ml  UD


 IV  3/10/25 15:30


     





 


 Polyethylene


 Glycol  17 gm  BID


 PO  3/11/25 14:45


    3/15/25 22:39


17 GM


 


 Pantoprazole


 Sodium  40 mg  BID


 IV  3/11/25 22:00


    3/17/25 11:47


40 MG


 


 Amiodarone HCl  200 mg  Q12HR


 PO  3/12/25 22:00


    3/17/25 10:51


200 MG


 


 Guaifenesin/


 Dextromethorphan  10 ml  Q4HP  PRN


 PO  3/12/25 17:30


    3/14/25 02:29


10 ML


 


 Lactulose  30 ml  Q12H


 PO  3/14/25 02:00


    3/16/25 02:23


30 ML


 


 Hydrocortisone


 Sodium Succinate  50 mg  Q6HR


 IV  3/16/25 00:00


    3/17/25 17:56


50 MG


 


 Sodium Chloride  1 gm  TID


 PO  3/16/25 14:00


    3/17/25 17:56


1 GM


 


 Amino Acids/


 Electrolytes/


 Dextrose  1,000 ml @ 


 41 mls/hr  DAILY@2200


 IV  3/16/25 22:00


 3/17/25 21:59  3/16/25 22:17


41 MLS/HR


 


 Amino Acids/


 Electrolytes/


 Dextrose  2,000 ml @ 


 41 mls/hr  DAILY@2200


 IV  3/17/25 22:00


     











objective


Gen.: Patient lying in bed in no apparent distress. On room air


Head: Normocephalic, atraumatic.


Eyes: EOMI/PERRLA.


Ears: Normal hearing. Normal anatomy.


Neck/trachea: Trachea midline, supple.


Nose: Normal external anatomy.


Mouth: Moist mucous membranes.


Chest: Decreased air entry bilaterally. No wheezing or rhonchi.


Cardiovascular: Positive S1, positive S2. Regular rate and rhythm.


Abdomen: Positive bowel sounds in all 4 quadrants. Soft, non-tender, non-

distended.


: Deferred.


Rectal: Deferred.


Skin: Warm, dry. Intact.


Extremities: 2+ radial pulses bilaterally. No lower extremity edema.


Neuro: Awake, alert, oriented x3. No gross motor or sensory deficits. Cranial 

nerves II through XII intact. Gait not assessed.


laboratory and microbiology


                                Laboratory Tests


3/17/25 08:05








3/17/25 03:00

















Test


 3/17/25


08:05 Range/Units


 


 


Serum Glucose 140 H   mg/dL








Assessment/Plan


Impression:


Acute hypoxic respiratory failure


Shock


Atrial fibrillation, rate controlled


Lactic acidosis


End-stage renal disease, on peritoneal dialysis


Obesity





Events:


Remains on room air.


Supplemental oxygen PRN


Improved O2 requirements





Pt underwent peritoneal dialysis yesterday





Iam-Synephrine drip was tapered off, hemodynamically stable


Monitor blood pressure.





Continue antibiotics


Stress dose steroids - Hydrocortisone 50 mg IV q.6 hours.





Hemoglobin trended down to 8.4 g/dL





Continue Protonix BID


Clinimix for nutritional support





Monitor hemoglobin


Transfuse if less than 7.0 g/dL.





Monitor renal function


Monitor electrolytes. Supplement as necessary.


Monitor sodium d/t hyponatremia - currently 125


K, mag supplementation


Nephrology recs appreciated





Wound care.





Labs and imaging reviewed.


Rest of plan as noted below.





Plan:


Supplemental oxygen PRN


Titrate to keep O2 sats above 92%.





Incentive spirometry





Continue antibiotics - meropenem


Follow up cultures





Amiodarone PO


Cardiology recs appreciated.





Pressors as necessary for hemodynamic support


Titrate to keep mean arterial pressure greater than 65 mmHg.


Monitor blood pressure





Monitor renal function.


PD per Nephrology


Monitor electrolytes. Supplement as necessary.


Monitor ins and outs.


Nephrology recommendations appreciated





Diet and lifestyle modifications for weight reduction


Obesity - complicates all care





DVT prophylaxis.





Prognosis: Poor given patient's multiple co-morbidities.


Condition: Critical





Rest of plan per hospitalist and other consultants.





A total of 35 minutes of critical care time was spent reviewing the patient 

record, examining the patient, making a diagnostic and therapeutic plan, 

discussing this plan with the medical personnel, following up on diagnostic 

studies and following the patient for clinical stability excluding any and all 

procedures.  At least 50% of this time was spent in direct, face-to-face 

contact.





Thank you, Dr. Platt, for allowing me to participate in this patient's care.


Further recommendations will depend on the patient's clinical course.


Please do not hesitate to contact me if you have any questions or concerns.





This medical document was created using an electronic medical record system with

Dragon computerized dictation system. Although these documentations are being 

carefully reviewed, there may still be some phonetic and typographical changes. 

The errors are purely typographical, due to imperfection on the software 

program, and do not reflect any compromise in the patient's medical care.





Dietary Evaluation Review


Comments:  


Encourage high protein diet. consider Nepro 240ml 19, 432 kcal. PO  


supplements BID


Expected Outcomes/Goals:  


maintain protein levels WNL


Plan discussed with:  Other (RN Bianca)


Critical Care Time(min):  35











JOSEPH SUAREZ MD             Mar 17, 2025 21:48

## 2025-03-17 NOTE — DVHPN2
Progress Note


Date Seen:  Mar 17, 2025


Medical Necessity Reason


Pt with a Central, PICC or Fol:  Yes





Subjective


Patient reports:  Other (abd pain)


Review of Systems:  Deferred





Objective


vital signs





                                   Vital Sign








  Date Time  Temp Pulse Resp B/P (MAP) Pulse Ox O2 Delivery O2 Flow Rate FiO2


 


3/17/25 15:15  69 16 91/38 (55) 98   


 


3/17/25 12:58      Room Air* 0 21


 


3/17/25 08:00 97.8       





 97.8       














                           Total Intake and Output   


 


 3/16/25 3/16/25 3/17/25





 15:00 23:00 07:00


 


Intake Total 2402.816 ml 2877.5 ml 608.325 ml


 


Output Total 300 ml 400 ml 600 ml


 


Balance 2102.816 ml 2477.5 ml 8.325 ml








medications





                               Current Medications








 Medications  Dose


 Ordered  Sig/Anthony


 Route  Start Time


 Stop Time Status Last Admin


Dose Admin


 


 Ondansetron HCl  4 mg  Q4HP  PRN


 IV  3/4/25 17:30


    3/14/25 14:58


4 MG


 


 Morphine Sulfate  2 mg  Q4HPRN  PRN


 IV  3/4/25 17:30


    3/17/25 03:02


2 MG


 


 Morphine Sulfate  2 mg  Q30M  PRN


 IV  3/4/25 17:30


     





 


 Acetaminophen/


 Hydrocodone Bitart  1 tab  Q4HP  PRN


 PO  3/4/25 17:30


    3/15/25 11:00


1 TAB


 


 Acetaminophen  650 mg  Q6HP  PRN


 PO  3/4/25 17:30


    3/15/25 09:30


650 MG


 


 Nitroglycerin  0.4 mg  Q5MINP  PRN


 SL  3/4/25 17:30


     





 


 Hydroxychloroquine


 Sulfate  200 mg  BID


 PO  3/4/25 22:00


    3/17/25 10:51


200 MG


 


 Levothyroxine


 Sodium  100 mcg  DAILY


 PO  3/5/25 10:00


    3/17/25 10:52


100 MCG


 


 Atorvastatin


 Calcium  10 mg  HS


 PO  3/4/25 22:00


    3/16/25 22:17


10 MG


 


 Docusate Sodium  100 mg  BIDP  PRN


 PO  3/4/25 21:00


     





 


 Budesonide  0.5 mg  BID


 NEB  3/4/25 22:00


    3/17/25 06:51


0.5 MG


 


 Midodrine  10 mg  TID@0600,1200,1800


 PO  3/5/25 06:00


    3/17/25 11:47


10 MG


 


 Levalbuterol HCl  0.625 mg  Q6HR


 NEB  3/6/25 06:00


    3/17/25 12:58


0.625 MG


 


 Phenylephrine HCl


 80 mg/Sodium


 Chloride  250 ml @ 


 7.5 mls/hr  Q24H


 IV  3/6/25 15:00


    3/16/25 16:01


15 MLS/HR


 


 Vancomycin HCl  0 ml @ 0


 mls/hr  UD


 IV  3/8/25 09:00


     





 


 Meropenem  50 ml @ 17


 mls/hr  DAILY


 IV  3/10/25 10:00


    3/17/25 10:52


17 MLS/HR


 


 Norepinephrine


 Bitartrate 32 mg/


 Sodium Chloride  250 ml @ 


 0.938 mls/


 hr  Q24H


 IV  3/9/25 13:45


    3/14/25 09:27


0.938 MLS/HR


 


 Epoetin Kiran-epbx  10,000 unit  MWF@2100


 SC  3/12/25 21:00


    3/14/25 22:05


10,000 UNIT


 


 Amino Acids  0 ml @ 0


 mls/hr  PER  PHARMACY


 IV  3/10/25 15:00


     





 


 Diagnostic Test


  (Pha)  1 strip  Q6HR


 VI  3/10/25 18:00


    3/17/25 11:47


1 STRIP


 


 Insulin Human


 Regular  FOLLOW


 SLIDING


 SCALE  Q6HR


 SC  3/10/25 18:00


    3/17/25 06:21


2 UNITS


 


 Dextrose  50 ml  UD


 IV  3/10/25 15:30


     





 


 Polyethylene


 Glycol  17 gm  BID


 PO  3/11/25 14:45


    3/15/25 22:39


17 GM


 


 Pantoprazole


 Sodium  40 mg  BID


 IV  3/11/25 22:00


    3/17/25 11:47


40 MG


 


 Amiodarone HCl  200 mg  Q12HR


 PO  3/12/25 22:00


    3/17/25 10:51


200 MG


 


 Guaifenesin/


 Dextromethorphan  10 ml  Q4HP  PRN


 PO  3/12/25 17:30


    3/14/25 02:29


10 ML


 


 Peritoneal


 Dialysis Solution  2,000 ml  0600,1000,1400,1800,2200


 IP  3/13/25 18:00


    3/17/25 15:30


2,000 ML


 


 Lactulose  30 ml  Q12H


 PO  3/14/25 02:00


    3/16/25 02:23


30 ML


 


 Hydrocortisone


 Sodium Succinate  50 mg  Q6HR


 IV  3/16/25 00:00


    3/17/25 14:08


50 MG


 


 Sodium Chloride  1 gm  TID


 PO  3/16/25 14:00


    3/17/25 06:26


1 GM


 


 Amino Acids/


 Electrolytes/


 Dextrose  1,000 ml @ 


 41 mls/hr  DAILY@2200


 IV  3/16/25 22:00


 3/17/25 21:59  3/16/25 22:17


41 MLS/HR


 


 Amino Acids/


 Electrolytes/


 Dextrose  2,000 ml @ 


 41 mls/hr  DAILY@2200


 IV  3/17/25 22:00


     











Examination:  GENERAL:Abnormal, LUNGS:Abnormal, ABDOMEN:Abnormal, MSK:Abnormal, 

NEURO:Normal


laboratory and microbiology


                                Laboratory Tests


3/17/25 08:05








3/17/25 03:00

















Test


 3/17/25


08:05 Range/Units


 


 


Serum Glucose 140 H   mg/dL








                                  Microbiology








 Date/Time


Source Procedure


Growth Status





 


 3/8/25 16:30


Blood Blood Culture - Final


NO GROWTH AFTER 5 DAYS OF INCUBATION. Complete





 3/6/25 05:30


Peritoneal Fluid Gram Stain - Final


 Complete


 


 3/6/25 05:30


Peritoneal Fluid Body Fluid Culture - Final


 Complete


 


 3/5/25 23:00


Nose MRSA Screen - Final


 Complete











Problem List/Assessment/Plan


Problem List/Assessment/Plan


ESRD on peritoneal dialysis


septic Shock


AFib


Hypokalemia


Lactic acidosis








recs


continue PD as ordered-on q4 hrs 2.5% dineal,,


--no evidence of peritonitis


abx per id 


We will follow closely


k replace prn


on vasopressors


Plan discussed with:  Patient, Other





My Orders


My Orders





                           Orders - AUSTEN DENNISON MD








Procedure Category Date Status





  Time 


 


Communication Order ORDERS 3/17/25 Transmitted





  11:34 











Dietary Evaluation Review


Comments:  


Encourage high protein diet. consider Nepro 240ml 19, 432 kcal. PO  


supplements BID


Expected Outcomes/Goals:  


maintain protein levels WNL


Critical Care Time (mins):  45











AUSTEN DENNISON MD             Mar 17, 2025 15:48

## 2025-03-17 NOTE — DVHPN2
Subjective


Overnight events noted.  Patient currently off of Iam-Synephrine.


Reviewed:  Care Plan


Changes from previous H/P or p:  No Changes


Eyes:  No Pain, No Vision change, No Conjunctivae inflammation, No Eyelid 

inflammation, No Other, No Redness


ENT:  No Ear pain, No Ear discharge, No Nose pain, No Nose discharge, No Nose 

congestion, No Mouth pain, No Mouth swelling, No Throat pain, No Throat 

swelling, No Other


Cardiovascular:  Chest Pain; No Palpitations, No Orthopnea, No Paroxysmal Noc. 

Dyspnea, No Edema, No Lt Headedness, No Other


Respiratory:  No Cough, No Dry; Shortness of breath; No SOB with excertion, No 

Wheezing, No Hemoptysis, No Pleuritic Pain, No Sputum, No Other


Gastrointestinal:  No Nausea, No Vomiting; Abdominal Pain, Diarrhea; No 

Constipation, No Melena, No Hematochezia, No Other


Genitourinary:  No Dysuria, No Frequency, No Incontinence, No Hematuria, No 

Retention, No Other


Musculoskeletal:  No other, No neck pain, No shoulder pain, No arm pain, No back

pain, No hand pain, No leg pain, No foot pain


Skin:  No Rash, No Lesions, No Jaundice, No Bruising, No Other





Objective


Vitals





Vital Signs








  Date Time  Temp Pulse Resp B/P (MAP) Pulse Ox O2 Delivery O2 Flow Rate FiO2


 


3/17/25 15:15  69 16 91/38 (55) 98   


 


3/17/25 12:58      Room Air* 0 21


 


3/17/25 08:00 97.8       





 97.8       








Intake/Output











                               Intake and Output 


 


 3/17/25





 07:00


 


Intake Total 5888.641 ml


 


Output Total 1300 ml


 


Balance 4588.641 ml


 


 


 


Intake Oral 530 ml


 


IV Total 958.641 ml


 


Intraperitoneal 4400 ml


 


Other 1300 ml


 


# Bowel Movements 3








Exam


HEENT pupils are reactive 


Neck is supple 


CV is S1-S2 regular rate and rhythm 


Respiratory diminished breath sounds bases 


GI positive bowel sound


Extremity bilateral pitting edema right more than


CNS no motor deficits, physical deconditioned


Medications





                               Current Medications








 Medications  Dose


 Ordered  Sig/Anthony


 Route  Start Time


 Stop Time Status Last Admin


Dose Admin


 


 Ondansetron HCl  4 mg  Q4HP  PRN


 IV  3/4/25 17:30


    3/14/25 14:58


4 MG


 


 Morphine Sulfate  2 mg  Q4HPRN  PRN


 IV  3/4/25 17:30


    3/17/25 03:02


2 MG


 


 Morphine Sulfate  2 mg  Q30M  PRN


 IV  3/4/25 17:30


     





 


 Acetaminophen/


 Hydrocodone Bitart  1 tab  Q4HP  PRN


 PO  3/4/25 17:30


    3/15/25 11:00


1 TAB


 


 Acetaminophen  650 mg  Q6HP  PRN


 PO  3/4/25 17:30


    3/15/25 09:30


650 MG


 


 Nitroglycerin  0.4 mg  Q5MINP  PRN


 SL  3/4/25 17:30


     





 


 Hydroxychloroquine


 Sulfate  200 mg  BID


 PO  3/4/25 22:00


    3/17/25 10:51


200 MG


 


 Levothyroxine


 Sodium  100 mcg  DAILY


 PO  3/5/25 10:00


    3/17/25 10:52


100 MCG


 


 Atorvastatin


 Calcium  10 mg  HS


 PO  3/4/25 22:00


    3/16/25 22:17


10 MG


 


 Docusate Sodium  100 mg  BIDP  PRN


 PO  3/4/25 21:00


     





 


 Budesonide  0.5 mg  BID


 NEB  3/4/25 22:00


    3/17/25 06:51


0.5 MG


 


 Midodrine  10 mg  TID@0600,1200,1800


 PO  3/5/25 06:00


    3/17/25 11:47


10 MG


 


 Levalbuterol HCl  0.625 mg  Q6HR


 NEB  3/6/25 06:00


    3/17/25 12:58


0.625 MG


 


 Phenylephrine HCl


 80 mg/Sodium


 Chloride  250 ml @ 


 7.5 mls/hr  Q24H


 IV  3/6/25 15:00


    3/16/25 16:01


15 MLS/HR


 


 Vancomycin HCl  0 ml @ 0


 mls/hr  UD


 IV  3/8/25 09:00


     





 


 Meropenem  50 ml @ 17


 mls/hr  DAILY


 IV  3/10/25 10:00


    3/17/25 10:52


17 MLS/HR


 


 Norepinephrine


 Bitartrate 32 mg/


 Sodium Chloride  250 ml @ 


 0.938 mls/


 hr  Q24H


 IV  3/9/25 13:45


    3/14/25 09:27


0.938 MLS/HR


 


 Epoetin Kiran-epbx  10,000 unit  MWF@2100


 SC  3/12/25 21:00


    3/14/25 22:05


10,000 UNIT


 


 Amino Acids  0 ml @ 0


 mls/hr  PER  PHARMACY


 IV  3/10/25 15:00


     





 


 Diagnostic Test


  (Pha)  1 strip  Q6HR


 VI  3/10/25 18:00


    3/17/25 11:47


1 STRIP


 


 Insulin Human


 Regular  FOLLOW


 SLIDING


 SCALE  Q6HR


 SC  3/10/25 18:00


    3/17/25 06:21


2 UNITS


 


 Dextrose  50 ml  UD


 IV  3/10/25 15:30


     





 


 Polyethylene


 Glycol  17 gm  BID


 PO  3/11/25 14:45


    3/15/25 22:39


17 GM


 


 Pantoprazole


 Sodium  40 mg  BID


 IV  3/11/25 22:00


    3/17/25 11:47


40 MG


 


 Amiodarone HCl  200 mg  Q12HR


 PO  3/12/25 22:00


    3/17/25 10:51


200 MG


 


 Guaifenesin/


 Dextromethorphan  10 ml  Q4HP  PRN


 PO  3/12/25 17:30


    3/14/25 02:29


10 ML


 


 Peritoneal


 Dialysis Solution  2,000 ml  0600,1000,1400,1800,2200


 IP  3/13/25 18:00


    3/17/25 15:30


2,000 ML


 


 Lactulose  30 ml  Q12H


 PO  3/14/25 02:00


    3/16/25 02:23


30 ML


 


 Hydrocortisone


 Sodium Succinate  50 mg  Q6HR


 IV  3/16/25 00:00


    3/17/25 14:08


50 MG


 


 Sodium Chloride  1 gm  TID


 PO  3/16/25 14:00


    3/17/25 06:26


1 GM


 


 Amino Acids/


 Electrolytes/


 Dextrose  1,000 ml @ 


 41 mls/hr  DAILY@2200


 IV  3/16/25 22:00


 3/17/25 21:59  3/16/25 22:17


41 MLS/HR


 


 Amino Acids/


 Electrolytes/


 Dextrose  2,000 ml @ 


 41 mls/hr  DAILY@2200


 IV  3/17/25 22:00


     














Laboratory Results


Laboratory Tests


3/17/25 03:00








3/17/25 08:05











Chemistry








Test


 3/17/25


03:00 3/17/25


08:05


 


Albumin


 2.5 g/dL


(3.2-4.8)  L 2.6 g/dL


(3.2-4.8)  L


 


Calcium Level


 8.1 mg/dL


(8.7-10.4)  L 8.1 mg/dL


(8.7-10.4)  L


 


Magnesium Level


 1.5 mg/dL


(1.6-2.6)  L 





 


Phosphorus Level


 3.0 mg/dL


(2.4-5.1) 





 


Total Protein


 4.1 g/dL


(5.7-8.2)  L 4.3 g/dL


(5.7-8.2)  L








LFT








Test


 3/17/25


03:00 3/17/25


08:05


 


Alanine Aminotransferase (ALT) 11 U/L (7-40)   12 U/L (7-40)  


 


Alkaline Phosphatase


 132 U/L


()  H 145 U/L


()  H


 


Aspartate Amino Transferase


(AST) 20 U/L (13-40)


 20 U/L (13-40)





 


Total Bilirubin


 < 0.2 mg/dL


(0.2-1.0)  L 0.2 mg/dL


(0.2-1.0)








Urinalysis








Test


 3/4/25


16:30


 


Urine Color


 Colorless


(Yellow)


 


Urine Clarity Clear (Clear)  


 


Urine pH 7.5 (5.0-9.0)  


 


Urine Specific Gravity


 1.007


(1.001-1.035)


 


Urine Protein


 1+ (Negative)


H


 


Urine Ketones


 Negative


(Negative)


 


Urine Blood


 Negative /uL


(Negative)


 


Urine Nitrite


 Negative


(Negative)


 


Urine Bilirubin


 Negative


(Negative)


 


Urine Urobilinogen


 Normal mg/dL


(Negative)


 


Urine Leukocyte Esterase


 Negative /uL


(Negative)


 


Urine RBC


 6 /hpf (0 - 4)





 


Urine Microscopic WBC 3 /HPF (0-5)  


 


Urine Squamous Epithelial


Cells None seen /hpf


(<5)


 


Urine Bacteria


 None seen /hpf


(None Seen)


 


Urine Mucus


 Few (None


Seen)


 


Urine Glucose


 4+ mg/dL


(Normal)  H








Microbiology





                                  Microbiology








 Date/Time


Source Procedure


Growth Status





 


 3/8/25 16:30


Blood Blood Culture - Final


NO GROWTH AFTER 5 DAYS OF INCUBATION. Complete





 3/6/25 05:30


Peritoneal Fluid Gram Stain - Final


 Complete


 


 3/6/25 05:30


Peritoneal Fluid Body Fluid Culture - Final


 Complete


 


 3/5/25 23:00


Nose MRSA Screen - Final


 Complete











Assessment/Plan


Assessment/Plan


82-year-old female with past medical history of ESRD on PD, AFib diagnosed 

November 21, 2024, chronic abdominal pain, and diarrhea after taking lactulose 

presents after being sent in from urgent care for an abnormal EKG.  Patient 

initially went to urgent care with complaints of chest pain, recent diagnosis of

pneumonia and shortness of breath.  During the emergency department evaluation 

potassium levels found to be 5.7/5.9.  Patient also found to be AFib 130s.  

Patient was recently discharged from [facility] on November 21, 2024 on Eliquis 2.5 and amiodarone 


1. Chest pain MI ruled out


2. Hyperkalemia, improved


3. End-stage renal disease on peritoneal dialysis


4. Hypotension with a relative history of hypertension, today off of vasopressor


5. Chronic abdominal pain


6. Chronic AFib 


7. Hypothyroidism


8. Asthma/COPD 


-continue current meds.  Diet as tolerated


-diet as tolerated


-follow up Nephrology, continue new peritoneal dialysis-


Plan of care discussed with the bedside RN, patient daughter was updated at 

bedside yesterday.


Plan discussed with:  Other





Date of Service:  Mar 17, 2025


Billing Provider:  MARÍA OCONNELL MD


Common Visit Codes:  NOT BILLABLE











MARÍA OCONNELL MD             Mar 17, 2025 16:36

## 2025-03-17 NOTE — DVHPN2
Progress Note


Date Seen:  Mar 17, 2025


Resident Creating Document:  SIMONA OCASIO RESIDENT


Medical Necessity Reason


Pt with a Central, PICC or Fol:  Yes





Subjective


Review of Systems


82-year-old Turkmen-speaking female, ER staff translating, with PMH of ESRD, HLD

and HTN presented to ER with chest pain. Patient complains of nausea and 

vomiting for one day, mostly having brown-colored emesis per RN.  Patient has 

abdominal pain, periumbilical area. Last BM five days ago, usually patient has 

bowel movements every 2-3 days.  No melena or red blood in stool. Patient has 

had multiple colonoscopy in the past, last colonoscopy more than five years ago,

unsure of results. Patient has history of GERD.  No history of PUD.  No EGD in 

past.


Patient's abdomen is distended, patient is on peritoneal dialysis, patient got 1

unit of PRBC, today hemoglobin 9.1.  Patient is feeling nauseated but did not 

vomit, patient is getting Reglan.


Currently breathing on room air.


Patient has no hematemesis or vomiting.





Objective


vital signs





                                   Vital Sign








  Date Time  Temp Pulse Resp B/P (MAP) Pulse Ox O2 Delivery O2 Flow Rate FiO2


 


3/17/25 10:00     98 Room Air 0.0 


 


3/17/25 10:00        21


 


3/17/25 10:00  78      


 


3/17/25 08:00   15     


 


3/17/25 07:45    96/42 (60)    


 


3/17/25 04:00 97.9       





 97.9       














                           Total Intake and Output   


 


 3/16/25 3/16/25 3/17/25





 15:00 23:00 07:00


 


Intake Total 2402.816 ml 2877.5 ml 608.325 ml


 


Output Total 300 ml 400 ml 600 ml


 


Balance 2102.816 ml 2477.5 ml 8.325 ml








medications





                               Current Medications








 Medications  Dose


 Ordered  Sig/Anthony


 Route  Start Time


 Stop Time Status Last Admin


Dose Admin


 


 Ondansetron HCl  4 mg  Q4HP  PRN


 IV  3/4/25 17:30


    3/14/25 14:58


4 MG


 


 Morphine Sulfate  2 mg  Q4HPRN  PRN


 IV  3/4/25 17:30


    3/17/25 03:02


2 MG


 


 Morphine Sulfate  2 mg  Q30M  PRN


 IV  3/4/25 17:30


     





 


 Acetaminophen/


 Hydrocodone Bitart  1 tab  Q4HP  PRN


 PO  3/4/25 17:30


    3/15/25 11:00


1 TAB


 


 Acetaminophen  650 mg  Q6HP  PRN


 PO  3/4/25 17:30


    3/15/25 09:30


650 MG


 


 Nitroglycerin  0.4 mg  Q5MINP  PRN


 SL  3/4/25 17:30


     





 


 Hydroxychloroquine


 Sulfate  200 mg  BID


 PO  3/4/25 22:00


    3/16/25 22:17


200 MG


 


 Levothyroxine


 Sodium  100 mcg  DAILY


 PO  3/5/25 10:00


    3/16/25 09:31


100 MCG


 


 Atorvastatin


 Calcium  10 mg  HS


 PO  3/4/25 22:00


    3/16/25 22:17


10 MG


 


 Docusate Sodium  100 mg  BIDP  PRN


 PO  3/4/25 21:00


     





 


 Budesonide  0.5 mg  BID


 NEB  3/4/25 22:00


    3/17/25 06:51


0.5 MG


 


 Midodrine  10 mg  TID@0600,1200,1800


 PO  3/5/25 06:00


    3/17/25 06:15


10 MG


 


 Levalbuterol HCl  0.625 mg  Q6HR


 NEB  3/6/25 06:00


    3/17/25 06:51


0.625 MG


 


 Phenylephrine HCl


 80 mg/Sodium


 Chloride  250 ml @ 


 7.5 mls/hr  Q24H


 IV  3/6/25 15:00


    3/16/25 16:01


15 MLS/HR


 


 Vancomycin HCl  0 ml @ 0


 mls/hr  UD


 IV  3/8/25 09:00


     





 


 Meropenem  50 ml @ 17


 mls/hr  DAILY


 IV  3/10/25 10:00


    3/16/25 09:31


17 MLS/HR


 


 Norepinephrine


 Bitartrate 32 mg/


 Sodium Chloride  250 ml @ 


 0.938 mls/


 hr  Q24H


 IV  3/9/25 13:45


    3/14/25 09:27


0.938 MLS/HR


 


 Epoetin Kiran-epbx  10,000 unit  MWF@2100


 SC  3/12/25 21:00


    3/14/25 22:05


10,000 UNIT


 


 Amino Acids  0 ml @ 0


 mls/hr  PER  PHARMACY


 IV  3/10/25 15:00


     





 


 Diagnostic Test


  (Pha)  1 strip  Q6HR


 VI  3/10/25 18:00


    3/17/25 06:15


1 STRIP


 


 Insulin Human


 Regular  FOLLOW


 SLIDING


 SCALE  Q6HR


 SC  3/10/25 18:00


    3/17/25 06:21


2 UNITS


 


 Dextrose  50 ml  UD


 IV  3/10/25 15:30


     





 


 Polyethylene


 Glycol  17 gm  BID


 PO  3/11/25 14:45


    3/15/25 22:39


17 GM


 


 Pantoprazole


 Sodium  40 mg  BID


 IV  3/11/25 22:00


    3/16/25 22:17


40 MG


 


 Amiodarone HCl  200 mg  Q12HR


 PO  3/12/25 22:00


    3/16/25 22:17


200 MG


 


 Guaifenesin/


 Dextromethorphan  10 ml  Q4HP  PRN


 PO  3/12/25 17:30


    3/14/25 02:29


10 ML


 


 Peritoneal


 Dialysis Solution  2,000 ml  0600,1000,1400,1800,2200


 IP  3/13/25 18:00


    3/17/25 10:01


2,000 ML


 


 Lactulose  30 ml  Q12H


 PO  3/14/25 02:00


    3/16/25 02:23


30 ML


 


 Hydrocortisone


 Sodium Succinate  50 mg  Q6HR


 IV  3/16/25 00:00


    3/17/25 06:20


50 MG


 


 Sodium Chloride  1 gm  TID


 PO  3/16/25 14:00


    3/17/25 06:26


1 GM


 


 Amino Acids/


 Electrolytes/


 Dextrose  1,000 ml @ 


 41 mls/hr  DAILY@2200


 IV  3/16/25 22:00


    3/16/25 22:17


41 MLS/HR








Examination


GENERAL:  Not in acute distress.  


HEENT: EOMI,  Moist mucous membranes.  No scleral icterus.  No cervical 

lymphadenopathy.


LUNGS: Clear to auscultation bilaterally.  No accessory muscle use.


CARDIOVASCULAR: Regular rate and rhythm.  No murmur.  No JVD.


ABDOMEN: Mild-to-moderate periumbilical tenderness to palpation, +BS, ABD is 

distended


EXTREMITIES: No edema.  Nontender.


SKIN: No rashes or lesions.  Warm.


NEUROLOGIC:  Alert and oriented X3.


laboratory and microbiology


                                Laboratory Tests


3/17/25 08:05








3/17/25 03:00

















Test


 3/17/25


08:05 Range/Units


 


 


Serum Glucose 140 H   mg/dL








                                  Microbiology








 Date/Time


Source Procedure


Growth Status





 


 3/8/25 16:30


Blood Blood Culture - Final


NO GROWTH AFTER 5 DAYS OF INCUBATION. Complete





 3/6/25 05:30


Peritoneal Fluid Gram Stain - Final


 Complete


 


 3/6/25 05:30


Peritoneal Fluid Body Fluid Culture - Final


 Complete


 


 3/5/25 23:00


Nose MRSA Screen - Final


 Complete











Problem List/Assessment/Plan


Problem List/Assessment/Plan


# Intractable nausea vomiting


# Abdominal pain


# GI bleed


# Anemia


# ESRD on peritoneal dialysis


# AFib on blood thinners


# Lactic acidosis





- SOBT negative


- patient got one unit PRBC, hemoglobin is stable now.  today hemoglobin is 9.6


- Monitor H&H, transfuse if hemoglobin less than 7.0


- Colace b.i.d., consider use of lactulose if no bowel movements


- Plan for EGD when  patient is stable


- Continue Protonix 40 mg  b.i.d.


- Cont. MiraLax


- surgery is on board to rule out obstruction





Patient is stable on GI point of view, GI will follow as needed


Thank you so much for the opportunity to consult on your patient. In case of any

questions or concerns please feel free to reach out.





Case discussed with Dr. JERONIMO Leblanc.  The patient and caregiver team agreed to the

plan.


Plan discussed with:  Patient





Dietary Evaluation Review


Comments:  


Encourage high protein diet. consider Nepro 240ml 19, 432 kcal. PO  


supplements BID


Expected Outcomes/Goals:  


maintain protein levels AVTARL











SIMONA OCASIO RESIDENT        Mar 17, 2025 10:30

## 2025-03-17 NOTE — DVHPN2
Progress Note - Dictate


Date Seen:  Mar 17, 2025


Medical Necessity Reason


Pt with a Central, PICC or Fol:  Yes


Subjective


Patient is on peritoneal dialysis, patient got 1 unit of PRBC


Hemoglobin is at 9.1.


vital signs





                                   Vital Sign








  Date Time  Temp Pulse Resp B/P (MAP) Pulse Ox O2 Delivery O2 Flow Rate FiO2


 


3/17/25 11:30  83 12 103/43 (63) 98   


 


3/17/25 10:00      Room Air 0.0 


 


3/17/25 10:00        21


 


3/17/25 08:00 97.8       





 97.8       














                           Total Intake and Output   


 


 3/16/25 3/16/25 3/17/25





 15:00 23:00 07:00


 


Intake Total 2402.816 ml 2877.5 ml 608.325 ml


 


Output Total 300 ml 400 ml 600 ml


 


Balance 2102.816 ml 2477.5 ml 8.325 ml








medications





                               Current Medications








 Medications  Dose


 Ordered  Sig/Anthony


 Route  Start Time


 Stop Time Status Last Admin


Dose Admin


 


 Ondansetron HCl  4 mg  Q4HP  PRN


 IV  3/4/25 17:30


    3/14/25 14:58


4 MG


 


 Morphine Sulfate  2 mg  Q4HPRN  PRN


 IV  3/4/25 17:30


    3/17/25 03:02


2 MG


 


 Morphine Sulfate  2 mg  Q30M  PRN


 IV  3/4/25 17:30


     





 


 Acetaminophen/


 Hydrocodone Bitart  1 tab  Q4HP  PRN


 PO  3/4/25 17:30


    3/15/25 11:00


1 TAB


 


 Acetaminophen  650 mg  Q6HP  PRN


 PO  3/4/25 17:30


    3/15/25 09:30


650 MG


 


 Nitroglycerin  0.4 mg  Q5MINP  PRN


 SL  3/4/25 17:30


     





 


 Hydroxychloroquine


 Sulfate  200 mg  BID


 PO  3/4/25 22:00


    3/17/25 10:51


200 MG


 


 Levothyroxine


 Sodium  100 mcg  DAILY


 PO  3/5/25 10:00


    3/17/25 10:52


100 MCG


 


 Atorvastatin


 Calcium  10 mg  HS


 PO  3/4/25 22:00


    3/16/25 22:17


10 MG


 


 Docusate Sodium  100 mg  BIDP  PRN


 PO  3/4/25 21:00


     





 


 Budesonide  0.5 mg  BID


 NEB  3/4/25 22:00


    3/17/25 06:51


0.5 MG


 


 Midodrine  10 mg  TID@0600,1200,1800


 PO  3/5/25 06:00


    3/17/25 11:47


10 MG


 


 Levalbuterol HCl  0.625 mg  Q6HR


 NEB  3/6/25 06:00


    3/17/25 06:51


0.625 MG


 


 Phenylephrine HCl


 80 mg/Sodium


 Chloride  250 ml @ 


 7.5 mls/hr  Q24H


 IV  3/6/25 15:00


    3/16/25 16:01


15 MLS/HR


 


 Vancomycin HCl  0 ml @ 0


 mls/hr  UD


 IV  3/8/25 09:00


     





 


 Meropenem  50 ml @ 17


 mls/hr  DAILY


 IV  3/10/25 10:00


    3/17/25 10:52


17 MLS/HR


 


 Norepinephrine


 Bitartrate 32 mg/


 Sodium Chloride  250 ml @ 


 0.938 mls/


 hr  Q24H


 IV  3/9/25 13:45


    3/14/25 09:27


0.938 MLS/HR


 


 Epoetin Kiran-epbx  10,000 unit  MWF@2100


 SC  3/12/25 21:00


    3/14/25 22:05


10,000 UNIT


 


 Amino Acids  0 ml @ 0


 mls/hr  PER  PHARMACY


 IV  3/10/25 15:00


     





 


 Diagnostic Test


  (Pha)  1 strip  Q6HR


 VI  3/10/25 18:00


    3/17/25 11:47


1 STRIP


 


 Insulin Human


 Regular  FOLLOW


 SLIDING


 SCALE  Q6HR


 SC  3/10/25 18:00


    3/17/25 06:21


2 UNITS


 


 Dextrose  50 ml  UD


 IV  3/10/25 15:30


     





 


 Polyethylene


 Glycol  17 gm  BID


 PO  3/11/25 14:45


    3/15/25 22:39


17 GM


 


 Pantoprazole


 Sodium  40 mg  BID


 IV  3/11/25 22:00


    3/17/25 11:47


40 MG


 


 Amiodarone HCl  200 mg  Q12HR


 PO  3/12/25 22:00


    3/17/25 10:51


200 MG


 


 Guaifenesin/


 Dextromethorphan  10 ml  Q4HP  PRN


 PO  3/12/25 17:30


    3/14/25 02:29


10 ML


 


 Peritoneal


 Dialysis Solution  2,000 ml  0600,1000,1400,1800,2200


 IP  3/13/25 18:00


    3/17/25 10:01


2,000 ML


 


 Lactulose  30 ml  Q12H


 PO  3/14/25 02:00


    3/16/25 02:23


30 ML


 


 Hydrocortisone


 Sodium Succinate  50 mg  Q6HR


 IV  3/16/25 00:00


    3/17/25 06:20


50 MG


 


 Sodium Chloride  1 gm  TID


 PO  3/16/25 14:00


    3/17/25 06:26


1 GM


 


 Amino Acids/


 Electrolytes/


 Dextrose  1,000 ml @ 


 41 mls/hr  DAILY@2200


 IV  3/16/25 22:00


 3/17/25 21:59  3/16/25 22:17


41 MLS/HR


 


 Amino Acids/


 Electrolytes/


 Dextrose  2,000 ml @ 


 41 mls/hr  DAILY@2200


 IV  3/17/25 22:00


     











objective


General Appearance:  Alert, , mild distress


HEENT:  Atraumatic, PERRLA, EOMI


Respiratory:  Clear to auscultation, Normal air movement


Cardiovascular:  Normal S1, Normal S2, Other (afib)


Abdominal:  non tender. 


Extremities:  No clubbing, No cyanosis, Normal pulses ,edema +


Skin:  No rashes, No breakdown


Neuro:  grossly intact


laboratory and microbiology


                                Laboratory Tests


3/17/25 08:05








3/17/25 03:00

















Test


 3/17/25


08:05 Range/Units


 


 


Serum Glucose 140 H   mg/dL








Assessment/Plan


Patient is a 82-year-old female presents to the hospital with:





Hypotension/Shock


Lactic Acidosis 


Afib uncontrolled


Chest pain


Hyperkalemia


ESRD on PD


Chronic abdominal pain


Diarrhea 





Recommendations:


Patient is still on 2 pressors, however slightly decreasing Levophed use.





Continue Vancomycin/ Meropenem empirically


Pulm/ crit care on board


follow blood culture: no growth


no clear source of infection





hypotension is likely multifactorial cardiac due to Afib with rvr vs medication 

induced/ acidosis





s/p  Peritoneal fluid analysis: cell count not qualifying for peritonitis. 

cultures are also negative 








Antibiotic status:


Meropenem IV [Restarted on 03/10]


Vancomycin IV [Started on 03/05 - 03/08]





03/16, Vancomycin Random is 20.4 





03/04, Abdomen Pelvis CT showed No acute intra-abdominal finding. Isodense 

lesion of the right mid kidney measuring 1.2 cm, attention on follow-up is 

recommended.  


Compression fracture of L2 and possibly superior endplate of L1 with mild 

posterior extension resulting in mild spinal canal stenosis, age-indeterminate  





03/04, Blood culture showed no growth





03/06, Fluid culture preliminary showed no growth





03/05, MRSA screening negative





critical care time 35 minutes which includes assessment, coordinating plan with 

nurse and consultants. 





prognosis poor


plan discussed with RN





Thank you for consult.





Dietary Evaluation Review


Comments:  


Encourage high protein diet. consider Nepro 240ml 19, 432 kcal. PO  


supplements BID


Expected Outcomes/Goals:  


maintain protein levels WNL











GELA ELLISON MD            Mar 17, 2025 11:52

## 2025-03-18 NOTE — DVHPN2
Progress Note - Dictate


Date Seen:  Mar 18, 2025


Medical Necessity Reason


Pt with a Central, PICC or Fol:  Yes


Subjective


Patient seen and examined at bedside.


Breathing comfortably on room air


Overnight events reviewed.


vital signs





                                   Vital Sign








  Date Time  Temp Pulse Resp B/P (MAP) Pulse Ox O2 Delivery O2 Flow Rate FiO2


 


3/18/25 21:30  82 16 95/36 (55) 99   


 


3/18/25 20:00 97.5       





 97.5       


 


3/18/25 18:35      Room Air 0.0 


 


3/18/25 18:35        21














                           Total Intake and Output   


 


 3/17/25 3/17/25 3/18/25





 15:00 23:00 07:00


 


Intake Total 4207.2 ml 240 ml 201 ml


 


Output Total 4700 ml 0 ml 


 


Balance -492.8 ml 240 ml 201 ml








medications





                               Current Medications








 Medications  Dose


 Ordered  Sig/Anthony


 Route  Start Time


 Stop Time Status Last Admin


Dose Admin


 


 Ondansetron HCl  4 mg  Q4HP  PRN


 IV  3/4/25 17:30


    3/18/25 20:50


4 MG


 


 Morphine Sulfate  2 mg  Q4HPRN  PRN


 IV  3/4/25 17:30


    3/17/25 03:02


2 MG


 


 Morphine Sulfate  2 mg  Q30M  PRN


 IV  3/4/25 17:30


     





 


 Acetaminophen/


 Hydrocodone Bitart  1 tab  Q4HP  PRN


 PO  3/4/25 17:30


    3/18/25 20:50


1 TAB


 


 Acetaminophen  650 mg  Q6HP  PRN


 PO  3/4/25 17:30


    3/18/25 10:54


650 MG


 


 Nitroglycerin  0.4 mg  Q5MINP  PRN


 SL  3/4/25 17:30


     





 


 Levothyroxine


 Sodium  100 mcg  DAILY


 PO  3/5/25 10:00


    3/18/25 10:54


100 MCG


 


 Atorvastatin


 Calcium  10 mg  HS


 PO  3/4/25 22:00


    3/18/25 23:10


10 MG


 


 Docusate Sodium  100 mg  BIDP  PRN


 PO  3/4/25 21:00


     





 


 Budesonide  0.5 mg  BID


 NEB  3/4/25 22:00


    3/18/25 18:35


0.5 MG


 


 Midodrine  10 mg  TID@0600,1200,1800


 PO  3/5/25 06:00


    3/18/25 18:00


10 MG


 


 Levalbuterol HCl  0.625 mg  Q6HR


 NEB  3/6/25 06:00


    3/18/25 18:35


0.625 MG


 


 Phenylephrine HCl


 80 mg/Sodium


 Chloride  250 ml @ 


 7.5 mls/hr  Q24H


 IV  3/6/25 15:00


    3/16/25 16:01


15 MLS/HR


 


 Vancomycin HCl  0 ml @ 0


 mls/hr  UD


 IV  3/8/25 09:00


     





 


 Meropenem  50 ml @ 17


 mls/hr  DAILY


 IV  3/10/25 10:00


    3/18/25 10:53


17 MLS/HR


 


 Norepinephrine


 Bitartrate 32 mg/


 Sodium Chloride  250 ml @ 


 0.938 mls/


 hr  Q24H


 IV  3/9/25 13:45


    3/14/25 09:27


0.938 MLS/HR


 


 Epoetin Kiran-epbx  10,000 unit  MWF@2100


 SC  3/12/25 21:00


    3/17/25 22:23


10,000 UNIT


 


 Amino Acids  0 ml @ 0


 mls/hr  PER  PHARMACY


 IV  3/10/25 15:00


     





 


 Diagnostic Test


  (Pha)  1 strip  Q6HR


 VI  3/10/25 18:00


    3/18/25 18:01


1 STRIP


 


 Insulin Human


 Regular  FOLLOW


 SLIDING


 SCALE  Q6HR


 SC  3/10/25 18:00


    3/18/25 05:43


4 UNITS


 


 Dextrose  50 ml  UD


 IV  3/10/25 15:30


     





 


 Polyethylene


 Glycol  17 gm  BID


 PO  3/11/25 14:45


    3/18/25 23:10


17 GM


 


 Pantoprazole


 Sodium  40 mg  BID


 IV  3/11/25 22:00


    3/18/25 23:10


40 MG


 


 Guaifenesin/


 Dextromethorphan  10 ml  Q4HP  PRN


 PO  3/12/25 17:30


    3/14/25 02:29


10 ML


 


 Lactulose  30 ml  Q12H


 PO  3/14/25 02:00


    3/16/25 02:23


30 ML


 


 Hydrocortisone


 Sodium Succinate  50 mg  Q6HR


 IV  3/16/25 00:00


    3/18/25 23:10


50 MG


 


 Mupirocin  1 applic  BID


 TOP  3/18/25 22:00


    3/18/25 23:10


1 APPLIC


 


 Amiodarone HCl  200 mg  DAILY


 PO  3/19/25 10:00


     





 


 Amino Acids  1,000 ml @ 


 41 mls/hr  DAILY@2200


 IV  3/18/25 22:00


    3/18/25 23:10


41 MLS/HR








objective


Gen.: Patient lying in bed in no apparent distress. On room air


Head: Normocephalic, atraumatic.


Eyes: EOMI/PERRLA.


Ears: Normal hearing. Normal anatomy.


Neck/trachea: Trachea midline, supple.


Nose: Normal external anatomy.


Mouth: Moist mucous membranes.


Chest: Decreased air entry bilaterally. No wheezing or rhonchi.


Cardiovascular: Positive S1, positive S2. Regular rate and rhythm.


Abdomen: Positive bowel sounds in all 4 quadrants. Soft, non-tender, non-

distended.


: Deferred.


Rectal: Deferred.


Skin: Warm, dry. Intact.


Extremities: 2+ radial pulses bilaterally. No lower extremity edema.


Neuro: Awake, alert, oriented x3. No gross motor or sensory deficits. Cranial 

nerves II through XII intact. Gait not assessed.


laboratory and microbiology


                                Laboratory Tests


3/18/25 20:12








3/18/25 03:26

















Test


 3/18/25


03:26 Range/Units


 


 


Serum Glucose 169 H   mg/dL








Assessment/Plan


Impression:


Acute hypoxic respiratory failure


Shock


Atrial fibrillation, rate controlled


Lactic acidosis


End-stage renal disease, on peritoneal dialysis


Obesity





Events:


Remains on room air.


Supplemental oxygen PRN





Nausea/vomiting - Zofran PRN.





Plan for peritoneal dialysis in the AM.





Head of bed elevation


Aspiration precautions





On pressors for hemodynamic support


Levophed 2 mcg/min


Titrate to keep mean arterial pressure greater than 65 mmHg. 





Monitor blood pressure.





Continue antibiotics


Stress dose steroids - Hydrocortisone 50 mg IV q.6 hours.





Continue Protonix BID


Clinimix for nutritional support





Monitor hemoglobin


Transfuse if less than 7.0 g/dL.





Monitor renal function


Monitor electrolytes. Supplement as necessary.


K, mag supplementation


Nephrology recs appreciated





Wound care.





Labs and imaging reviewed.


Rest of plan as noted below.





Plan:


Supplemental oxygen PRN


Titrate to keep O2 sats above 92%.





Incentive spirometry





Continue antibiotics - meropenem


Follow up cultures





Amiodarone PO


Cardiology recs appreciated.





Pressors for hemodynamic support


Titrate to keep mean arterial pressure greater than 65 mmHg.


Monitor blood pressure





Monitor renal function.


PD per Nephrology


Monitor electrolytes. Supplement as necessary.


Monitor ins and outs.


Nephrology recommendations appreciated





Diet and lifestyle modifications for weight reduction


Obesity - complicates all care





DVT prophylaxis.





Prognosis: Poor given patient's multiple co-morbidities.


Condition: Critical





Rest of plan per hospitalist and other consultants.





A total of 35 minutes of critical care time was spent reviewing the patient 

record, examining the patient, making a diagnostic and therapeutic plan, 

discussing this plan with the medical personnel, following up on diagnostic 

studies and following the patient for clinical stability excluding any and all 

procedures.  At least 50% of this time was spent in direct, face-to-face 

contact.





Thank you, Dr. Platt, for allowing me to participate in this patient's care.


Further recommendations will depend on the patient's clinical course.


Please do not hesitate to contact me if you have any questions or concerns.





This medical document was created using an electronic medical record system with

Dragon computerized dictation system. Although these documentations are being 

carefully reviewed, there may still be some phonetic and typographical changes. 

The errors are purely typographical, due to imperfection on the software 

program, and do not reflect any compromise in the patient's medical care.





Dietary Evaluation Review


Comments:  


Encourage high protein diet. consider Nepro 240ml 19, 432 kcal. PO  


supplements BID


Expected Outcomes/Goals:  


maintain protein levels WNL


Plan discussed with:  Other (RN Bianca)


Critical Care Time(min):  35











JOSEPH SUAREZ MD             Mar 18, 2025 23:33

## 2025-03-18 NOTE — DVHPN2
Progress Note - Dictate


Date Seen:  Mar 18, 2025


Medical Necessity Reason


Pt with a Central, PICC or Fol:  Yes


Subjective


Patient back on Iam-Synephrine, and low-dose of Levophed.


vital signs





                                   Vital Sign








  Date Time  Temp Pulse Resp B/P (MAP) Pulse Ox O2 Delivery O2 Flow Rate FiO2


 


3/18/25 08:45  75 13 92/29 (50) 99   


 


3/18/25 08:00      Room Air* 0 21


 


3/18/25 08:00 97.9       





 97.9       














                           Total Intake and Output   


 


 3/17/25 3/17/25 3/18/25





 15:00 23:00 07:00


 


Intake Total 4207.2 ml 240 ml 201 ml


 


Output Total 4700 ml 0 ml 


 


Balance -492.8 ml 240 ml 201 ml








medications





                               Current Medications








 Medications  Dose


 Ordered  Sig/Anthony


 Route  Start Time


 Stop Time Status Last Admin


Dose Admin


 


 Ondansetron HCl  4 mg  Q4HP  PRN


 IV  3/4/25 17:30


    3/14/25 14:58


4 MG


 


 Morphine Sulfate  2 mg  Q4HPRN  PRN


 IV  3/4/25 17:30


    3/17/25 03:02


2 MG


 


 Morphine Sulfate  2 mg  Q30M  PRN


 IV  3/4/25 17:30


     





 


 Acetaminophen/


 Hydrocodone Bitart  1 tab  Q4HP  PRN


 PO  3/4/25 17:30


    3/18/25 03:26


1 TAB


 


 Acetaminophen  650 mg  Q6HP  PRN


 PO  3/4/25 17:30


    3/15/25 09:30


650 MG


 


 Nitroglycerin  0.4 mg  Q5MINP  PRN


 SL  3/4/25 17:30


     





 


 Hydroxychloroquine


 Sulfate  200 mg  BID


 PO  3/4/25 22:00


    3/17/25 10:51


200 MG


 


 Levothyroxine


 Sodium  100 mcg  DAILY


 PO  3/5/25 10:00


    3/17/25 10:52


100 MCG


 


 Atorvastatin


 Calcium  10 mg  HS


 PO  3/4/25 22:00


    3/17/25 22:24


10 MG


 


 Docusate Sodium  100 mg  BIDP  PRN


 PO  3/4/25 21:00


     





 


 Budesonide  0.5 mg  BID


 NEB  3/4/25 22:00


    3/18/25 06:21


0.5 MG


 


 Midodrine  10 mg  TID@0600,1200,1800


 PO  3/5/25 06:00


    3/18/25 07:58


10 MG


 


 Levalbuterol HCl  0.625 mg  Q6HR


 NEB  3/6/25 06:00


    3/18/25 06:21


0.625 MG


 


 Phenylephrine HCl


 80 mg/Sodium


 Chloride  250 ml @ 


 7.5 mls/hr  Q24H


 IV  3/6/25 15:00


    3/16/25 16:01


15 MLS/HR


 


 Vancomycin HCl  0 ml @ 0


 mls/hr  UD


 IV  3/8/25 09:00


     





 


 Meropenem  50 ml @ 17


 mls/hr  DAILY


 IV  3/10/25 10:00


    3/17/25 10:52


17 MLS/HR


 


 Norepinephrine


 Bitartrate 32 mg/


 Sodium Chloride  250 ml @ 


 0.938 mls/


 hr  Q24H


 IV  3/9/25 13:45


    3/14/25 09:27


0.938 MLS/HR


 


 Epoetin Kiran-epbx  10,000 unit  MWF@2100


 SC  3/12/25 21:00


    3/17/25 22:23


10,000 UNIT


 


 Amino Acids  0 ml @ 0


 mls/hr  PER  PHARMACY


 IV  3/10/25 15:00


     





 


 Diagnostic Test


  (Pha)  1 strip  Q6HR


 VI  3/10/25 18:00


    3/18/25 05:03


1 STRIP


 


 Insulin Human


 Regular  FOLLOW


 SLIDING


 SCALE  Q6HR


 SC  3/10/25 18:00


    3/18/25 05:43


4 UNITS


 


 Dextrose  50 ml  UD


 IV  3/10/25 15:30


     





 


 Polyethylene


 Glycol  17 gm  BID


 PO  3/11/25 14:45


    3/15/25 22:39


17 GM


 


 Pantoprazole


 Sodium  40 mg  BID


 IV  3/11/25 22:00


    3/17/25 22:24


40 MG


 


 Amiodarone HCl  200 mg  Q12HR


 PO  3/12/25 22:00


    3/17/25 22:24


200 MG


 


 Guaifenesin/


 Dextromethorphan  10 ml  Q4HP  PRN


 PO  3/12/25 17:30


    3/14/25 02:29


10 ML


 


 Lactulose  30 ml  Q12H


 PO  3/14/25 02:00


    3/16/25 02:23


30 ML


 


 Hydrocortisone


 Sodium Succinate  50 mg  Q6HR


 IV  3/16/25 00:00


    3/18/25 05:39


50 MG


 


 Sodium Chloride  1 gm  TID


 PO  3/16/25 14:00


    3/17/25 17:56


1 GM


 


 Amino Acids/


 Electrolytes/


 Dextrose  2,000 ml @ 


 41 mls/hr  DAILY@2200


 IV  3/17/25 22:00


    3/17/25 22:25


41 MLS/HR








objective


General Appearance:  Alert, , mild distress


HEENT:  Atraumatic, PERRLA, EOMI


Respiratory:  Clear to auscultation, Normal air movement


Cardiovascular:  Normal S1, Normal S2, Other (afib)


Abdominal:  non tender. 


Extremities:  No clubbing, No cyanosis, Normal pulses ,edema +


Skin:  No rashes, No breakdown


Neuro:  grossly intact


laboratory and microbiology


                                Laboratory Tests


3/18/25 03:26

















Test


 3/18/25


03:26 Range/Units


 


 


Serum Glucose 169 H   mg/dL








Assessment/Plan


Patient is a 82-year-old female presents to the hospital with:





Hypotension/Shock


Lactic Acidosis 


Afib uncontrolled


Chest pain


Hyperkalemia


ESRD on PD


Chronic abdominal pain


Diarrhea 





Recommendations:


Patient is still on 2 pressors, however slightly decreasing Levophed use.





Continue Vancomycin/ Meropenem empirically


Pulm/ crit care on board


follow blood culture: no growth


no clear source of infection





hypotension is likely multifactorial cardiac due to Afib with rvr vs medication 

induced/ acidosis





s/p  Peritoneal fluid analysis: cell count not qualifying for peritonitis. 

cultures are also negative 








Antibiotic status:


Meropenem IV [Restarted on 03/10]


Vancomycin IV [Started on 03/05 - 03/08]





03/18, Vancomycin Random is 15.7 





03/04, Abdomen Pelvis CT showed No acute intra-abdominal finding. Isodense 

lesion of the right mid kidney measuring 1.2 cm, attention on follow-up is 

recommended.  


Compression fracture of L2 and possibly superior endplate of L1 with mild 

posterior extension resulting in mild spinal canal stenosis, age-indeterminate  





03/04, Blood culture showed no growth





03/06, Fluid culture preliminary showed no growth





03/05, MRSA screening negative





critical care time 35 minutes which includes assessment, coordinating plan with 

nurse and consultants. 





prognosis poor


plan discussed with RN





Thank you for consult.





Dietary Evaluation Review


Comments:  


Encourage high protein diet. consider Nepro 240ml 19, 432 kcal. PO  


supplements BID


Expected Outcomes/Goals:  


maintain protein levels WNL











GELA ELLISON MD            Mar 18, 2025 09:56

## 2025-03-18 NOTE — DVHPN2
Progress Note


Date Seen:  Mar 18, 2025


Medical Necessity Reason


Pt with a Central, PICC or Fol:  Yes





Subjective


Patient reports:  Other (Patient seen and examined with daughter bedside RN 

bedside)


Review of Systems:  GI:Abnormal, MSK:Abnormal





Objective


vital signs





                                   Vital Sign








  Date Time  Temp Pulse Resp B/P (MAP) Pulse Ox O2 Delivery O2 Flow Rate FiO2


 


3/18/25 12:30  65 13 98/39 (58) 99   


 


3/18/25 12:00 97.9       





 97.9       


 


3/18/25 10:00      Room Air* 0 21














                           Total Intake and Output   


 


 3/17/25 3/17/25 3/18/25





 15:00 23:00 07:00


 


Intake Total 4207.2 ml 240 ml 201 ml


 


Output Total 4700 ml 0 ml 


 


Balance -492.8 ml 240 ml 201 ml








medications





                               Current Medications








 Medications  Dose


 Ordered  Sig/Anthony


 Route  Start Time


 Stop Time Status Last Admin


Dose Admin


 


 Ondansetron HCl  4 mg  Q4HP  PRN


 IV  3/4/25 17:30


    3/18/25 13:29


4 MG


 


 Morphine Sulfate  2 mg  Q4HPRN  PRN


 IV  3/4/25 17:30


    3/17/25 03:02


2 MG


 


 Morphine Sulfate  2 mg  Q30M  PRN


 IV  3/4/25 17:30


     





 


 Acetaminophen/


 Hydrocodone Bitart  1 tab  Q4HP  PRN


 PO  3/4/25 17:30


    3/18/25 03:26


1 TAB


 


 Acetaminophen  650 mg  Q6HP  PRN


 PO  3/4/25 17:30


    3/18/25 10:54


650 MG


 


 Nitroglycerin  0.4 mg  Q5MINP  PRN


 SL  3/4/25 17:30


     





 


 Levothyroxine


 Sodium  100 mcg  DAILY


 PO  3/5/25 10:00


    3/18/25 10:54


100 MCG


 


 Atorvastatin


 Calcium  10 mg  HS


 PO  3/4/25 22:00


    3/17/25 22:24


10 MG


 


 Docusate Sodium  100 mg  BIDP  PRN


 PO  3/4/25 21:00


     





 


 Budesonide  0.5 mg  BID


 NEB  3/4/25 22:00


    3/18/25 06:21


0.5 MG


 


 Midodrine  10 mg  TID@0600,1200,1800


 PO  3/5/25 06:00


    3/18/25 12:59


10 MG


 


 Levalbuterol HCl  0.625 mg  Q6HR


 NEB  3/6/25 06:00


    3/18/25 11:40


0.625 MG


 


 Phenylephrine HCl


 80 mg/Sodium


 Chloride  250 ml @ 


 7.5 mls/hr  Q24H


 IV  3/6/25 15:00


    3/16/25 16:01


15 MLS/HR


 


 Vancomycin HCl  0 ml @ 0


 mls/hr  UD


 IV  3/8/25 09:00


     





 


 Meropenem  50 ml @ 17


 mls/hr  DAILY


 IV  3/10/25 10:00


    3/18/25 10:53


17 MLS/HR


 


 Norepinephrine


 Bitartrate 32 mg/


 Sodium Chloride  250 ml @ 


 0.938 mls/


 hr  Q24H


 IV  3/9/25 13:45


    3/14/25 09:27


0.938 MLS/HR


 


 Epoetin Kiran-epbx  10,000 unit  MWF@2100


 SC  3/12/25 21:00


    3/17/25 22:23


10,000 UNIT


 


 Amino Acids  0 ml @ 0


 mls/hr  PER  PHARMACY


 IV  3/10/25 15:00


     





 


 Diagnostic Test


  (Pha)  1 strip  Q6HR


 VI  3/10/25 18:00


    3/18/25 12:59


1 STRIP


 


 Insulin Human


 Regular  FOLLOW


 SLIDING


 SCALE  Q6HR


 SC  3/10/25 18:00


    3/18/25 05:43


4 UNITS


 


 Dextrose  50 ml  UD


 IV  3/10/25 15:30


     





 


 Polyethylene


 Glycol  17 gm  BID


 PO  3/11/25 14:45


    3/15/25 22:39


17 GM


 


 Pantoprazole


 Sodium  40 mg  BID


 IV  3/11/25 22:00


    3/18/25 10:53


40 MG


 


 Amiodarone HCl  200 mg  Q12HR


 PO  3/12/25 22:00


    3/18/25 10:54


200 MG


 


 Guaifenesin/


 Dextromethorphan  10 ml  Q4HP  PRN


 PO  3/12/25 17:30


    3/14/25 02:29


10 ML


 


 Lactulose  30 ml  Q12H


 PO  3/14/25 02:00


    3/16/25 02:23


30 ML


 


 Hydrocortisone


 Sodium Succinate  50 mg  Q6HR


 IV  3/16/25 00:00


    3/18/25 12:59


50 MG


 


 Amino Acids/


 Electrolytes/


 Dextrose  2,000 ml @ 


 41 mls/hr  DAILY@2200


 IV  3/17/25 22:00


    3/17/25 22:25


41 MLS/HR


 


 Mupirocin  1 applic  BID


 TOP  3/18/25 22:00


     











Examination:  GENERAL:Normal, LUNGS:Abnormal, ABDOMEN:Abnormal (Appears 

distended), MSK:Abnormal (Positive edema), SKIN:Normal, NEURO:Normal


laboratory and microbiology


                                Laboratory Tests


3/18/25 03:26

















Test


 3/18/25


03:26 Range/Units


 


 


Serum Glucose 169 H   mg/dL








                                  Microbiology








 Date/Time


Source Procedure


Growth Status





 


 3/8/25 16:30


Blood Blood Culture - Final


NO GROWTH AFTER 5 DAYS OF INCUBATION. Complete





 3/6/25 05:30


Peritoneal Fluid Gram Stain - Final


 Complete


 


 3/6/25 05:30


Peritoneal Fluid Body Fluid Culture - Final


 Complete


 


 3/5/25 23:00


Nose MRSA Screen - Final


 Complete











Problem List/Assessment/Plan


Problem List/Assessment/Plan


ESRD on peritoneal dialysis


septic Shock


AFib


Hypokalemia


Hypokalemia likely secondary to diarrhea


Hyponatremia likely secondary to Clinimix mixed in D10


Lactic acidosis








recs


Patient is switched  to CAPD using 7.5 percent Ico dextrin and and 2.5 percent 

Dianeal


--no evidence of peritonitis on cultures /cell count


abx per id 


Pharmacy is not able to mix in a different solution other than D10 for 

Clinimix---D10 is worsening sodium


Hypokalemia secondary to excessive bowel movements---replace potassium daily


k replace prn


on vasopressors


Consider reduction in amiodarone dosing


Needs strict Is&Os charting


Plan discussed with:  Daughter, Other (Discussed with hospitalist Dr. Feliciano)





My Orders


My Orders





                           Orders - AUSTEN DENNISON MD








Procedure Category Date Status





  Time 


 


Communication Order ORDERS 3/17/25 Transmitted





  19:11 


 


Communication Order ORDERS 3/17/25 Transmitted





  19:10 


 


Mupirocin 2% Ointment PHA 3/18/25 In Process





 (Bactroban 2% Oint  22:00 


 


Communication Order ORDERS 3/18/25 Transmitted





  14:24 











Dietary Evaluation Review


Comments:  


Encourage high protein diet. consider Nepro 240ml 19, 432 kcal. PO  


supplements BID


Expected Outcomes/Goals:  


maintain protein levels WNL


Critical Care Time (mins):  48











AUSTEN DENNISON MD             Mar 18, 2025 15:04

## 2025-03-18 NOTE — [id number]
For informational purposes only. Not to replace the advice of your health care provider. Copyright   2003,  [facility]. [facility]. All rights reserved. Clinically reviewed by Yadira Hernandez MD. [employer] [id number] - REV .  Preparing for Your Surgery  Getting started  A nurse will call you to review your health history and instructions. They will give you an arrival time based on your scheduled surgery time. Please be ready to share:    Your doctor's clinic name and phone number    Your medical, surgical, and anesthesia history    A list of allergies and sensitivities    A list of medicines, including herbal treatments and over-the-counter drugs    Whether the patient has a legal guardian (ask how to send us the papers in advance)  Please tell us if you're pregnant--or if there's any chance you might be pregnant. Some surgeries may injure a fetus (unborn baby), so they require a pregnancy test. Surgeries that are safe for a fetus don't always need a test, and you can choose whether to have one.   If you have a child who's having surgery, please ask for a copy of Preparing for Your Child's Surgery.    Preparing for surgery    Within 10 to 30 days of surgery: Have a pre-op exam (sometimes called an H&P, or History and Physical). This can be done at a clinic or pre-operative center.  ? If you're having a , you may not need this exam. Talk to your care team.    At your pre-op exam, talk to your care team about all medicines you take. If you need to stop any medicines before surgery, ask when to start taking them again.  ? We do this for your safety. Many medicines can make you bleed too much during surgery. Some change how well surgery (anesthesia) drugs work.    Call your insurance company to let them know you're having surgery. (If you don't have insurance, call [phone].)    Call your clinic if there's any change in your health. This includes signs of a cold or flu (sore throat, runny nose,  Subjective


Overnight events noted.  Patient back on Iam-Synephrine, and low-dose of 

Levophed, daughter was updated on the phone in the presence of bedside RN.


Reviewed:  Care Plan


Changes from previous H/P or p:  No Changes


Eyes:  No Pain, No Vision change, No Conjunctivae inflammation, No Eyelid 

inflammation, No Other, No Redness


ENT:  No Ear pain, No Ear discharge, No Nose pain, No Nose discharge, No Nose 

congestion, No Mouth pain, No Mouth swelling, No Throat pain, No Throat 

swelling, No Other


Cardiovascular:  Chest Pain; No Palpitations, No Orthopnea, No Paroxysmal Noc. 

Dyspnea, No Edema, No Lt Headedness, No Other


Respiratory:  No Cough, No Dry; Shortness of breath; No SOB with excertion, No 

Wheezing, No Hemoptysis, No Pleuritic Pain, No Sputum, No Other


Gastrointestinal:  No Nausea, No Vomiting; Abdominal Pain, Diarrhea; No 

Constipation, No Melena, No Hematochezia, No Other


Genitourinary:  No Dysuria, No Frequency, No Incontinence, No Hematuria, No 

Retention, No Other


Musculoskeletal:  No other, No neck pain, No shoulder pain, No arm pain, No back

pain, No hand pain, No leg pain, No foot pain


Skin:  No Rash, No Lesions, No Jaundice, No Bruising, No Other





Objective


Vitals





Vital Signs








  Date Time  Temp Pulse Resp B/P (MAP) Pulse Ox O2 Delivery O2 Flow Rate FiO2


 


3/18/25 18:00  87      


 


3/18/25 16:45   17 105/38 (60) 100   


 


3/18/25 16:00 98.0       





 98.0       


 


3/18/25 10:00      Room Air* 0 21








Intake/Output











                               Intake and Output 


 


 3/18/25





 07:00


 


Intake Total 4648.2 ml


 


Output Total 4700 ml


 


Balance -51.8 ml


 


 


 


Intake Oral 400 ml


 


IV Total 48.2 ml


 


Intraperitoneal 4200 ml


 


Output Urine Total 0 ml


 


Other 4700 ml


 


# Bowel Movements 2








Exam


HEENT pupils are reactive 


Neck is supple 


CV is S1-S2 regular rate and rhythm 


Respiratory diminished breath sounds bases 


GI positive bowel sound


Extremity bilateral pitting edema right more than


CNS no motor deficits, physical deconditioned


Medications





                               Current Medications








 Medications  Dose


 Ordered  Sig/Anthony


 Route  Start Time


 Stop Time Status Last Admin


Dose Admin


 


 Ondansetron HCl  4 mg  Q4HP  PRN


 IV  3/4/25 17:30


    3/18/25 13:29


4 MG


 


 Morphine Sulfate  2 mg  Q4HPRN  PRN


 IV  3/4/25 17:30


    3/17/25 03:02


2 MG


 


 Morphine Sulfate  2 mg  Q30M  PRN


 IV  3/4/25 17:30


     





 


 Acetaminophen/


 Hydrocodone Bitart  1 tab  Q4HP  PRN


 PO  3/4/25 17:30


    3/18/25 03:26


1 TAB


 


 Acetaminophen  650 mg  Q6HP  PRN


 PO  3/4/25 17:30


    3/18/25 10:54


650 MG


 


 Nitroglycerin  0.4 mg  Q5MINP  PRN


 SL  3/4/25 17:30


     





 


 Levothyroxine


 Sodium  100 mcg  DAILY


 PO  3/5/25 10:00


    3/18/25 10:54


100 MCG


 


 Atorvastatin


 Calcium  10 mg  HS


 PO  3/4/25 22:00


    3/17/25 22:24


10 MG


 


 Docusate Sodium  100 mg  BIDP  PRN


 PO  3/4/25 21:00


     





 


 Budesonide  0.5 mg  BID


 NEB  3/4/25 22:00


    3/18/25 06:21


0.5 MG


 


 Midodrine  10 mg  TID@0600,1200,1800


 PO  3/5/25 06:00


    3/18/25 18:00


10 MG


 


 Levalbuterol HCl  0.625 mg  Q6HR


 NEB  3/6/25 06:00


    3/18/25 11:40


0.625 MG


 


 Phenylephrine HCl


 80 mg/Sodium


 Chloride  250 ml @ 


 7.5 mls/hr  Q24H


 IV  3/6/25 15:00


    3/16/25 16:01


15 MLS/HR


 


 Vancomycin HCl  0 ml @ 0


 mls/hr  UD


 IV  3/8/25 09:00


     





 


 Meropenem  50 ml @ 17


 mls/hr  DAILY


 IV  3/10/25 10:00


    3/18/25 10:53


17 MLS/HR


 


 Norepinephrine


 Bitartrate 32 mg/


 Sodium Chloride  250 ml @ 


 0.938 mls/


 hr  Q24H


 IV  3/9/25 13:45


    3/14/25 09:27


0.938 MLS/HR


 


 Epoetin Kiran-epbx  10,000 unit  MWF@2100


 SC  3/12/25 21:00


    3/17/25 22:23


10,000 UNIT


 


 Amino Acids  0 ml @ 0


 mls/hr  PER  PHARMACY


 IV  3/10/25 15:00


     





 


 Diagnostic Test


  (Pha)  1 strip  Q6HR


 VI  3/10/25 18:00


    3/18/25 18:01


1 STRIP


 


 Insulin Human


 Regular  FOLLOW


 SLIDING


 SCALE  Q6HR


 SC  3/10/25 18:00


    3/18/25 05:43


4 UNITS


 


 Dextrose  50 ml  UD


 IV  3/10/25 15:30


     





 


 Polyethylene


 Glycol  17 gm  BID


 PO  3/11/25 14:45


    3/15/25 22:39


17 GM


 


 Pantoprazole


 Sodium  40 mg  BID


 IV  3/11/25 22:00


    3/18/25 10:53


40 MG


 


 Guaifenesin/


 Dextromethorphan  10 ml  Q4HP  PRN


 PO  3/12/25 17:30


    3/14/25 02:29


10 ML


 


 Lactulose  30 ml  Q12H


 PO  3/14/25 02:00


    3/16/25 02:23


30 ML


 


 Hydrocortisone


 Sodium Succinate  50 mg  Q6HR


 IV  3/16/25 00:00


    3/18/25 18:01


50 MG


 


 Amino Acids/


 Electrolytes/


 Dextrose  2,000 ml @ 


 41 mls/hr  DAILY@2200


 IV  3/17/25 22:00


 3/18/25 21:59  3/17/25 22:25


41 MLS/HR


 


 Mupirocin  1 applic  BID


 TOP  3/18/25 22:00


     





 


 Amiodarone HCl  200 mg  DAILY


 PO  3/19/25 10:00


     





 


 Amino Acids  1,000 ml @ 


 41 mls/hr  DAILY@2200


 IV  3/18/25 22:00


     














Laboratory Results


Laboratory Tests


3/18/25 03:26











Chemistry








Test


 3/18/25


03:26


 


Albumin


 2.6 g/dL


(3.2-4.8)  L


 


Calcium Level


 8.2 mg/dL


(8.7-10.4)  L


 


Magnesium Level


 1.4 mg/dL


(1.6-2.6)  L


 


Phosphorus Level


 1.8 mg/dL


(2.4-5.1)  L


 


Total Protein


 4.2 g/dL


(5.7-8.2)  L








LFT








Test


 3/18/25


03:26


 


Alanine Aminotransferase (ALT) 17 U/L (7-40)  


 


Alkaline Phosphatase


 179 U/L


()  H


 


Aspartate Amino Transferase


(AST) 40 U/L (13-40)





 


Total Bilirubin


 0.2 mg/dL


(0.2-1.0)








Urinalysis








Test


 3/4/25


16:30


 


Urine Color


 Colorless


(Yellow)


 


Urine Clarity Clear (Clear)  


 


Urine pH 7.5 (5.0-9.0)  


 


Urine Specific Gravity


 1.007


(1.001-1.035)


 


Urine Protein


 1+ (Negative)


H


 


Urine Ketones


 Negative


(Negative)


 


Urine Blood


 Negative /uL


(Negative)


 


Urine Nitrite


 Negative


(Negative)


 


Urine Bilirubin


 Negative


(Negative)


 


Urine Urobilinogen


 Normal mg/dL


(Negative)


 


Urine Leukocyte Esterase


 Negative /uL


(Negative)


 


Urine RBC


 6 /hpf (0 - 4)





 


Urine Microscopic WBC 3 /HPF (0-5)  


 


Urine Squamous Epithelial


Cells None seen /hpf


(<5)


 


Urine Bacteria


 None seen /hpf


(None Seen)


 


Urine Mucus


 Few (None


Seen)


 


Urine Glucose


 4+ mg/dL


(Normal)  H








Microbiology





                                  Microbiology








 Date/Time


Source Procedure


Growth Status





 


 3/8/25 16:30


Blood Blood Culture - Final


NO GROWTH AFTER 5 DAYS OF INCUBATION. Complete





 3/6/25 05:30


Peritoneal Fluid Gram Stain - Final


 Complete


 


 3/6/25 05:30


Peritoneal Fluid Body Fluid Culture - Final


 Complete


 


 3/5/25 23:00


Nose MRSA Screen - Final


 Complete











Assessment/Plan


Assessment/Plan


82-year-old female with past medical history of ESRD on PD, AFib diagnosed 

November 21, 2024, chronic abdominal pain, and diarrhea after taking lactulose 

presents after being sent in from urgent care for an abnormal EKG.  Patient 

initially went to urgent care with complaints of chest pain, recent diagnosis of

pneumonia and shortness of breath.  During the emergency department evaluation 

potassium levels found to be 5.7/5.9.  Patient also found to be AFib 130s.  

Patient was recently discharged from [facility] on November 21, 2024 on Eliquis 2.5 and amiodarone 


1. Chest pain MI ruled out


2. Hyperkalemia, improved


3. End-stage renal disease on peritoneal dialysis


4. Hypotension with a relative history of hypertension, today off of vasopressor


5. Chronic abdominal pain


6. Chronic AFib 


7. Hypothyroidism


8. Asthma/COPD 


-continue current meds.  .  I would as tolerated, continue Clinimix


-continue IV vasopressin


-follow up Nephrology, continue  peritoneal dialysis-


Plan of care discussed with the bedside RN, patient daughter was updated on the 

phone in the presence of bedside RN


Plan discussed with:  Other


My Orders





                           Orders - MARÍA OCONNELL MD








Procedure Category Date Status





  Time 


 


Amiodarone Tablet PHA 3/19/25 In Process





 (Cordarone Tablet)  10:00 











Date of Service:  Mar 18, 2025


Billing Provider:  MARÍA OCONNELL MD


Common Visit Codes:  NOT BILLABLE











MARÍA OCONNELL MD             Mar 18, 2025 18:34 cough, rash, fever). It also includes a scrape or scratch near the surgery site.    If you have questions on the day of surgery, call your hospital or surgery center.  Eating and drinking guidelines  For your safety: Unless your surgeon tells you otherwise, follow the guidelines below.    Eat and drink as usual until 8 hours before you arrive for surgery. After that, no food or milk.    Drink clear liquids until 2 hours before you arrive. These are liquids you can see through, like water, Gatorade, and Propel Water. They also include plain black coffee and tea (no cream or milk), candy, and breath mints. You can spit out gum when you arrive.    If you drink alcohol: Stop drinking it the night before surgery.    If your care team tells you to take medicine on the morning of surgery, it's okay to take it with a sip of water.  Preventing infection    Shower or bathe the night before and morning of your surgery. Follow the instructions your clinic gave you. (If no instructions, use regular soap.)    Don't shave or clip hair near your surgery site. We'll remove the hair if needed.    Don't smoke or vape the morning of surgery. You may chew nicotine gum up to 2 hours before surgery. A nicotine patch is okay.  ? Note: Some surgeries require you to completely quit smoking and nicotine. Check with your surgeon.    Your care team will make every effort to keep you safe from infection. We will:  ? Clean our hands often with soap and water (or an alcohol-based hand rub).  ? Clean the skin at your surgery site with a special soap that kills germs.  ? Give you a special gown to keep you warm. (Cold raises the risk of infection.)  ? Wear special hair covers, masks, gowns and gloves during surgery.  ? Give antibiotic medicine, if prescribed. Not all surgeries need antibiotics.  What to bring on the day of surgery    Photo ID and insurance card    Copy of your health care directive, if you have one    Glasses and hearing aids (bring  cases)  ? You can't wear contacts during surgery    Inhaler and eye drops, if you use them (tell us about these when you arrive)    CPAP machine or breathing device, if you use them    A few personal items, if spending the night    If you have . . .  ? A pacemaker, ICD (cardiac defibrillator) or other implant: Bring the ID card.  ? An implanted stimulator: Bring the remote control.  ? A legal guardian: Bring a copy of the certified (court-stamped) guardianship papers.  Please remove any jewelry, including body piercings. Leave jewelry and other valuables at home.  If you're going home the day of surgery    You must have a responsible adult drive you home. They should stay with you overnight as well.    If you don't have someone to stay with you, and you aren't safe to go home alone, we may keep you overnight. Insurance often won't pay for this.  After surgery  If it's hard to control your pain or you need more pain medicine, please call your surgeon's office.  Questions?   If you have any questions for your care team, list them here: _________________________________________________________________________________________________________________________________________________________________________ ____________________________________ ____________________________________ ____________________________________    How to Take Your Medication Before Surgery    Antiplatelet or Anticoagulation Medication Instructions:   - aspirin: Patient is at increased risk of thrombosis (e.g. stenting within the last year), continue aspirin without modification. Consider consultation with cardiology.    - clopidrogel (Plavix), prasugrel (Effient), ticagrelor (Brilinta): Patient has a cardiac stent. Medication will NOT be stopped until cleared by cardiology.     Additional Medication Instructions:   - Beta Blockers: Continue taking on the day of surgery.   - Statins: Continue taking on the day of surgery.    - celecoxib (Celebrex):  "HOLD 3 days before surgery. May continue without modification for management of severe pain.    - Herbal medications and vitamins: Hold fish oil       For informational purposes only. Not to replace the advice of your health care provider. Copyright   2003, 2019 [facility]. All rights reserved. Clinically reviewed by Yadira Hernandez MD. [employer] [id number] - REV 12/22.        How to Quit Smoking  Smoking is a hard habit to break. About half of all people who've ever smoked have been able to quit. Most people who still smoke want to quit. Here are some of the best ways to stop smoking.     Keep in mind the health benefits of quitting  The health benefits of quitting start right away. They keep improving the longer you go without smoking. Knowing this can help inspire you to stay on track. These benefits occur at any age. If you're 17 or 70, quitting is a good choice. Some of the health benefits after your last cigarette include:     After 20 minutes:  Your blood pressure and pulse return to normal.    After 8 hours: Your oxygen levels return to normal.    After 2 days: Your ability to smell and taste start to improve as damaged nerves regrow.    After 2 to 3 weeks: Your circulation and lung function improve.    After 1 to 9 months: Your coughing, congestion, and shortness of breath decrease. Your tiredness decreases.    After 1 year: Your risk of heart attack decreases by 50%.    After 5 years: Your risk of lung cancer decreases by 50%. Your risk of stroke becomes the same as a nonsmoker s.  What about going cold turkey?  You may have heard about quitting \"cold turkey.\" This means stopping all at once. Going cold turkey is an option. But it's not the most successful way to quit smoking. Also, trying to cut back slowly often doesn't work as well. This may be because it continues the habit of smoking. You may also inhale more smoke while smoking fewer cigarettes. This leads to the same amount of nicotine in " your body.   But quitting cold turkey or cutting back slowly aren't your only choices. Using tobacco cessation medicines with behavioral counseling may be a better option to help you succeed. Talk with your provider about your options for support while you quit smoking.   Get support  Support programs can be a big help, especially for heavy smokers. These groups offer information, ways to change behavior, and peer support. Ask your provider for some resources. Here are some other ways to find support:     Smokefree.gov at www.smokefree.gov  800-QUIT-NOW (003-702-3513)    American Lung Association at www.lung.org/quit-smoking  659.348.7033    American Cancer Society  at www.cancer.org/quitsmoking  695.175.9509  Support at home is important too. Family and friends can offer praise and reassurance. Ask your friends who smoke to support your decision. Try to stay away from situations that trigger the desire to smoke. This may include smoking with your morning coffee. Or smoking after a meal. Create new routines to help decrease your cravings.   If the smoker in your life finds it hard to quit, encourage them to keep trying. Remind them of all of the benefits to themselves and people they love.   Try over-the-counter nicotine replacements   Nicotine replacement therapy may make it easier to quit. You can buy some aids without a prescription. These include a nicotine patch, gum, and lozenges. But it's best to use these under the care of your healthcare provider. The skin patch gives a steady supply of nicotine. Nicotine gum and lozenges give short-time doses of low levels of nicotine. Both methods reduce the craving for cigarettes. If you have upset stomach (nausea), vomiting, dizziness, weakness, or a fast heartbeat, stop using these products and see your provider.   Ask about prescription medicine  After reviewing your smoking patterns and past attempts to quit, your provider may offer a prescription medicine. These  include bupropion, varenicline, a nicotine inhaler, or nasal spray. Each has advantages and side effects. Your provider can go over these with you.   Keep trying  Most smokers try to quit many times before they succeed. It s important not to give up.   [employer] last reviewed this educational content on 12/1/2019 2000-2022 [employer]. All rights reserved. This information is not intended as a substitute for professional medical care. Always follow your healthcare professional's instructions.          Tips for Quitting Smoking (Cardiovascular)   Quitting smoking is a gift to yourself. It's one of the best things you can do to keep your heart disease from getting worse. Smoking reduces oxygen flow to your heart. It does this in two ways. It speeds the buildup of plaque along the artery walls. And it changes the health of your blood vessels. This raises your risk for heart attack, also known as acute myocardial infarction (AMI). Quitting helps reduce smoking's harmful effects. You may have tried to quit before, but don t give up. Try again. Many smokers try a few times before they succeed. It's never too early to benefit from quitting smoking. This is especially true if you already have ongoing (chronic) conditions, such as high blood pressure and high cholesterol. These put you at higher risk for cardiovascular disease. Quitting smoking is a powerful way to reduce your risk for coronary disease.   Line up help     Ask for the support of your family and friends.    Join a smoking cessation class. Or ask your healthcare provider for a referral to a psychologist who specializes in helping people quit smoking.     Ask your healthcare provider about nicotine replacement products. Also ask about prescription medicines that can help you quit. It may take some time to find a product and schedule that works for you.    Set a quit date    Choose a date in the next 2 to 4 weeks.    After picking a day, alvaro it  in bold letters on a calendar.    Your quit list  Ideas to stop smoking include:  1. Start by giving up cigarettes at the times you least need them.  2. Keep some fruit close by at the times you are most likely to reach for a cigarette. For many people, smoking has an oral fixation component. This must be recognized and replaced by a healthy habit.  3. Use a nicotine replacement product instead of a cigarette.  Write down a few more ideas:  ______________________________________________________________________________  ______________________________________________________________________________  ______________________________________________________________________________  Set limits    Limit where you can smoke. Pick a room or a porch. Smoke only in that place.    Make smoking outdoors a house rule. Other smokers won t tempt you as much.    Talk with smokers around you about your intent to stop smoking. Then they can show consideration for you and limit their smoking around you.    Hang a list of  quit benefits  in the spot where you smoke. Put one on the refrigerator and another on your car dashboard.    For more information    National Cancer [employer] Smoking Quitlinesmokefree.gov/ohkq-du-bh-kuthtf174-41V-KOEG (930-592-1063)    [employer] last reviewed this educational content on 8/1/2022 2000-2022 [employer]. All rights reserved. This information is not intended as a substitute for professional medical care. Always follow your healthcare professional's instructions.

## 2025-03-18 NOTE — DVHPN2
Progress Note


Date Seen:  Mar 18, 2025


Resident Creating Document:  SIMONA OCASIO RESIDENT


Medical Necessity Reason


Pt with a Central, PICC or Fol:  Yes





Subjective


Review of Systems


82-year-old Amharic-speaking female, ER staff translating, with PMH of ESRD, HLD

and HTN presented to ER with chest pain. Patient complains of nausea and 

vomiting for one day, mostly having brown-colored emesis per RN.  Patient has 

abdominal pain, periumbilical area. Last BM five days ago, usually patient has 

bowel movements every 2-3 days.  No melena or red blood in stool. Patient has 

had multiple colonoscopy in the past, last colonoscopy more than five years ago,

unsure of results. Patient has history of GERD.  No history of PUD.  No EGD in 

past.


Patient's abdomen is distended, patient is on peritoneal dialysis, patient got 1

unit of PRBC, today hemoglobin 9.1.  Patient is feeling nauseated but did not 

vomit, patient is getting Reglan.


Currently breathing on room air.


Patient has no hematemesis or vomiting.





Objective


vital signs





                                   Vital Sign








  Date Time  Temp Pulse Resp B/P (MAP) Pulse Ox O2 Delivery O2 Flow Rate FiO2


 


3/18/25 12:30  65 13 98/39 (58) 99   


 


3/18/25 12:00 97.9       





 97.9       


 


3/18/25 10:00      Room Air* 0 21














                           Total Intake and Output   


 


 3/17/25 3/17/25 3/18/25





 15:00 23:00 07:00


 


Intake Total 4207.2 ml 240 ml 201 ml


 


Output Total 4700 ml 0 ml 


 


Balance -492.8 ml 240 ml 201 ml








medications





                               Current Medications








 Medications  Dose


 Ordered  Sig/Anthony


 Route  Start Time


 Stop Time Status Last Admin


Dose Admin


 


 Ondansetron HCl  4 mg  Q4HP  PRN


 IV  3/4/25 17:30


    3/18/25 13:29


4 MG


 


 Morphine Sulfate  2 mg  Q4HPRN  PRN


 IV  3/4/25 17:30


    3/17/25 03:02


2 MG


 


 Morphine Sulfate  2 mg  Q30M  PRN


 IV  3/4/25 17:30


     





 


 Acetaminophen/


 Hydrocodone Bitart  1 tab  Q4HP  PRN


 PO  3/4/25 17:30


    3/18/25 03:26


1 TAB


 


 Acetaminophen  650 mg  Q6HP  PRN


 PO  3/4/25 17:30


    3/18/25 10:54


650 MG


 


 Nitroglycerin  0.4 mg  Q5MINP  PRN


 SL  3/4/25 17:30


     





 


 Levothyroxine


 Sodium  100 mcg  DAILY


 PO  3/5/25 10:00


    3/18/25 10:54


100 MCG


 


 Atorvastatin


 Calcium  10 mg  HS


 PO  3/4/25 22:00


    3/17/25 22:24


10 MG


 


 Docusate Sodium  100 mg  BIDP  PRN


 PO  3/4/25 21:00


     





 


 Budesonide  0.5 mg  BID


 NEB  3/4/25 22:00


    3/18/25 06:21


0.5 MG


 


 Midodrine  10 mg  TID@0600,1200,1800


 PO  3/5/25 06:00


    3/18/25 12:59


10 MG


 


 Levalbuterol HCl  0.625 mg  Q6HR


 NEB  3/6/25 06:00


    3/18/25 11:40


0.625 MG


 


 Phenylephrine HCl


 80 mg/Sodium


 Chloride  250 ml @ 


 7.5 mls/hr  Q24H


 IV  3/6/25 15:00


    3/16/25 16:01


15 MLS/HR


 


 Vancomycin HCl  0 ml @ 0


 mls/hr  UD


 IV  3/8/25 09:00


     





 


 Meropenem  50 ml @ 17


 mls/hr  DAILY


 IV  3/10/25 10:00


    3/18/25 10:53


17 MLS/HR


 


 Norepinephrine


 Bitartrate 32 mg/


 Sodium Chloride  250 ml @ 


 0.938 mls/


 hr  Q24H


 IV  3/9/25 13:45


    3/14/25 09:27


0.938 MLS/HR


 


 Epoetin Kiran-epbx  10,000 unit  MWF@2100


 SC  3/12/25 21:00


    3/17/25 22:23


10,000 UNIT


 


 Amino Acids  0 ml @ 0


 mls/hr  PER  PHARMACY


 IV  3/10/25 15:00


     





 


 Diagnostic Test


  (Pha)  1 strip  Q6HR


 VI  3/10/25 18:00


    3/18/25 12:59


1 STRIP


 


 Insulin Human


 Regular  FOLLOW


 SLIDING


 SCALE  Q6HR


 SC  3/10/25 18:00


    3/18/25 05:43


4 UNITS


 


 Dextrose  50 ml  UD


 IV  3/10/25 15:30


     





 


 Polyethylene


 Glycol  17 gm  BID


 PO  3/11/25 14:45


    3/15/25 22:39


17 GM


 


 Pantoprazole


 Sodium  40 mg  BID


 IV  3/11/25 22:00


    3/18/25 10:53


40 MG


 


 Amiodarone HCl  200 mg  Q12HR


 PO  3/12/25 22:00


    3/18/25 10:54


200 MG


 


 Guaifenesin/


 Dextromethorphan  10 ml  Q4HP  PRN


 PO  3/12/25 17:30


    3/14/25 02:29


10 ML


 


 Lactulose  30 ml  Q12H


 PO  3/14/25 02:00


    3/16/25 02:23


30 ML


 


 Hydrocortisone


 Sodium Succinate  50 mg  Q6HR


 IV  3/16/25 00:00


    3/18/25 12:59


50 MG


 


 Amino Acids/


 Electrolytes/


 Dextrose  2,000 ml @ 


 41 mls/hr  DAILY@2200


 IV  3/17/25 22:00


    3/17/25 22:25


41 MLS/HR


 


 Mupirocin  1 applic  BID


 TOP  3/18/25 22:00


     











Examination


GENERAL:  Not in acute distress.  


HEENT: EOMI,  Moist mucous membranes.  No scleral icterus.  No cervical 

lymphadenopathy.


LUNGS: Clear to auscultation bilaterally.  No accessory muscle use.


CARDIOVASCULAR: Regular rate and rhythm.  No murmur.  No JVD.


ABDOMEN: Mild-to-moderate periumbilical tenderness to palpation, +BS, ABD is 

distended


EXTREMITIES: No edema.  Nontender.


SKIN: No rashes or lesions.  Warm.


NEUROLOGIC:  Alert and oriented X3.


laboratory and microbiology


                                Laboratory Tests


3/18/25 03:26

















Test


 3/18/25


03:26 Range/Units


 


 


Serum Glucose 169 H   mg/dL








                                  Microbiology








 Date/Time


Source Procedure


Growth Status





 


 3/8/25 16:30


Blood Blood Culture - Final


NO GROWTH AFTER 5 DAYS OF INCUBATION. Complete





 3/6/25 05:30


Peritoneal Fluid Gram Stain - Final


 Complete


 


 3/6/25 05:30


Peritoneal Fluid Body Fluid Culture - Final


 Complete


 


 3/5/25 23:00


Nose MRSA Screen - Final


 Complete











Problem List/Assessment/Plan


Problem List/Assessment/Plan


# Intractable nausea vomiting


# Abdominal pain


# GI bleed


# Anemia


# ESRD on peritoneal dialysis


# AFib on blood thinners


# Lactic acidosis





- SOBT negative


- patient got one unit PRBC, hemoglobin is stable now.  today hemoglobin is 9.6


- Monitor H&H, transfuse if hemoglobin less than 7.0


- Colace b.i.d., consider use of lactulose if no bowel movements


- Plan for EGD when  patient is stable


- Continue Protonix 40 mg  b.i.d.


- Cont. MiraLax


- surgery is on board to rule out obstruction





Patient is stable on GI point of view, 


GI team signing off the patient.


Thank you so much for the opportunity to consult on your patient. In case of any

questions or concerns please feel free to reach out.





Case discussed with Dr. JERONIMO Leblanc.  The patient and caregiver team agreed to the

plan.


Plan discussed with:  Other (RN)





Dietary Evaluation Review


Comments:  


Encourage high protein diet. consider Nepro 240ml 19, 432 kcal. PO  


supplements BID


Expected Outcomes/Goals:  


maintain protein levels WNL











SIMONA OCASIO RESIDENT        Mar 18, 2025 15:00

## 2025-03-19 NOTE — DVHPN2
Subjective


Overnight events noted.  Patient back on Iam-Synephrine, and low-dose of 

Levophed,


Reviewed:  Care Plan


Changes from previous H/P or p:  No Changes


Eyes:  No Pain, No Vision change, No Conjunctivae inflammation, No Eyelid 

inflammation, No Other, No Redness


ENT:  No Ear pain, No Ear discharge, No Nose pain, No Nose discharge, No Nose 

congestion, No Mouth pain, No Mouth swelling, No Throat pain, No Throat 

swelling, No Other


Cardiovascular:  Chest Pain; No Palpitations, No Orthopnea, No Paroxysmal Noc. 

Dyspnea, No Edema, No Lt Headedness, No Other


Respiratory:  No Cough, No Dry; Shortness of breath; No SOB with excertion, No 

Wheezing, No Hemoptysis, No Pleuritic Pain, No Sputum, No Other


Gastrointestinal:  No Nausea, No Vomiting; Abdominal Pain, Diarrhea; No 

Constipation, No Melena, No Hematochezia, No Other


Genitourinary:  No Dysuria, No Frequency, No Incontinence, No Hematuria, No 

Retention, No Other


Musculoskeletal:  No other, No neck pain, No shoulder pain, No arm pain, No back

pain, No hand pain, No leg pain, No foot pain


Skin:  No Rash, No Lesions, No Jaundice, No Bruising, No Other





Objective


Vitals





Vital Signs








  Date Time  Temp Pulse Resp B/P (MAP) Pulse Ox O2 Delivery O2 Flow Rate FiO2


 


3/19/25 15:15  73 23 110/52 (71) 98   


 


3/19/25 12:01 98.4       





 98.4       


 


3/19/25 10:00      Room Air* 0 21








Intake/Output











                               Intake and Output 


 


 3/19/25





 07:00


 


Intake Total 1770.940 ml


 


Output Total 400 ml


 


Balance 1370.940 ml


 


 


 


Intake Oral 737 ml


 


IV Total 1033.940 ml


 


Output Urine Total 0 ml


 


Emesis 400 ml


 


# Bowel Movements 3








Exam


HEENT pupils are reactive 


Neck is supple 


CV is S1-S2 regular rate and rhythm 


Respiratory diminished breath sounds bases 


GI positive bowel sound


Extremity bilateral pitting edema right more than


CNS no motor deficits, physical deconditioned


Medications





                               Current Medications








 Medications  Dose


 Ordered  Sig/Anthony


 Route  Start Time


 Stop Time Status Last Admin


Dose Admin


 


 Ondansetron HCl  4 mg  Q4HP  PRN


 IV  3/4/25 17:30


    3/19/25 10:18


4 MG


 


 Morphine Sulfate  2 mg  Q4HPRN  PRN


 IV  3/4/25 17:30


    3/17/25 03:02


2 MG


 


 Morphine Sulfate  2 mg  Q30M  PRN


 IV  3/4/25 17:30


     





 


 Acetaminophen/


 Hydrocodone Bitart  1 tab  Q4HP  PRN


 PO  3/4/25 17:30


    3/18/25 20:50


1 TAB


 


 Acetaminophen  650 mg  Q6HP  PRN


 PO  3/4/25 17:30


    3/19/25 10:18


650 MG


 


 Nitroglycerin  0.4 mg  Q5MINP  PRN


 SL  3/4/25 17:30


     





 


 Levothyroxine


 Sodium  100 mcg  DAILY


 PO  3/5/25 10:00


    3/19/25 10:16


100 MCG


 


 Atorvastatin


 Calcium  10 mg  HS


 PO  3/4/25 22:00


    3/18/25 23:10


10 MG


 


 Docusate Sodium  100 mg  BIDP  PRN


 PO  3/4/25 21:00


     





 


 Budesonide  0.5 mg  BID


 NEB  3/4/25 22:00


    3/19/25 06:54


0.5 MG


 


 Midodrine  10 mg  TID@0600,1200,1800


 PO  3/5/25 06:00


    3/19/25 12:43


10 MG


 


 Levalbuterol HCl  0.625 mg  Q6HR


 NEB  3/6/25 06:00


    3/19/25 11:45


0.625 MG


 


 Phenylephrine HCl


 80 mg/Sodium


 Chloride  250 ml @ 


 7.5 mls/hr  Q24H


 IV  3/6/25 15:00


    3/16/25 16:01


15 MLS/HR


 


 Vancomycin HCl  0 ml @ 0


 mls/hr  UD


 IV  3/8/25 09:00


     





 


 Meropenem  50 ml @ 17


 mls/hr  DAILY


 IV  3/10/25 10:00


    3/19/25 10:16


17 MLS/HR


 


 Norepinephrine


 Bitartrate 32 mg/


 Sodium Chloride  250 ml @ 


 0.938 mls/


 hr  Q24H


 IV  3/9/25 13:45


    3/14/25 09:27


0.938 MLS/HR


 


 Epoetin Kiran-epbx  10,000 unit  MWF@2100


 SC  3/12/25 21:00


    3/17/25 22:23


10,000 UNIT


 


 Diagnostic Test


  (Pha)  1 strip  Q6HR


 VI  3/10/25 18:00


    3/19/25 12:00


1 STRIP


 


 Insulin Human


 Regular  FOLLOW


 SLIDING


 SCALE  Q6HR


 SC  3/10/25 18:00


    3/19/25 05:29


2 UNITS


 


 Dextrose  50 ml  UD


 IV  3/10/25 15:30


     





 


 Polyethylene


 Glycol  17 gm  BID


 PO  3/11/25 14:45


    3/18/25 23:10


17 GM


 


 Pantoprazole


 Sodium  40 mg  BID


 IV  3/11/25 22:00


    3/19/25 10:15


40 MG


 


 Guaifenesin/


 Dextromethorphan  10 ml  Q4HP  PRN


 PO  3/12/25 17:30


    3/14/25 02:29


10 ML


 


 Lactulose  30 ml  Q12H


 PO  3/14/25 02:00


    3/16/25 02:23


30 ML


 


 Hydrocortisone


 Sodium Succinate  50 mg  Q6HR


 IV  3/16/25 00:00


    3/19/25 12:43


50 MG


 


 Mupirocin  1 applic  BID


 TOP  3/18/25 22:00


    3/19/25 10:18


1 APPLIC


 


 Amiodarone HCl  200 mg  DAILY


 PO  3/19/25 10:00


    3/19/25 10:16


200 MG











Laboratory Results


Laboratory Tests


3/19/25 03:30








3/19/25 14:30











Chemistry








Test


 3/18/25


20:12 3/19/25


03:30


 


Magnesium Level


 1.9 mg/dL


(1.6-2.6) 1.7 mg/dL


(1.6-2.6)


 


Albumin


 


 2.8 g/dL


(3.2-4.8)  L


 


Calcium Level


 


 8.6 mg/dL


(8.7-10.4)  L


 


Phosphorus Level


 


 3.1 mg/dL


(2.4-5.1)


 


Total Protein


 


 4.5 g/dL


(5.7-8.2)  L








LFT








Test


 3/19/25


03:30


 


Alanine Aminotransferase (ALT) 17 U/L (7-40)  


 


Alkaline Phosphatase


 180 U/L


()  H


 


Aspartate Amino Transferase


(AST) 22 U/L (13-40)





 


Total Bilirubin


 0.2 mg/dL


(0.2-1.0)








Urinalysis








Test


 3/4/25


16:30


 


Urine Color


 Colorless


(Yellow)


 


Urine Clarity Clear (Clear)  


 


Urine pH 7.5 (5.0-9.0)  


 


Urine Specific Gravity


 1.007


(1.001-1.035)


 


Urine Protein


 1+ (Negative)


H


 


Urine Ketones


 Negative


(Negative)


 


Urine Blood


 Negative /uL


(Negative)


 


Urine Nitrite


 Negative


(Negative)


 


Urine Bilirubin


 Negative


(Negative)


 


Urine Urobilinogen


 Normal mg/dL


(Negative)


 


Urine Leukocyte Esterase


 Negative /uL


(Negative)


 


Urine RBC


 6 /hpf (0 - 4)





 


Urine Microscopic WBC 3 /HPF (0-5)  


 


Urine Squamous Epithelial


Cells None seen /hpf


(<5)


 


Urine Bacteria


 None seen /hpf


(None Seen)


 


Urine Mucus


 Few (None


Seen)


 


Urine Glucose


 4+ mg/dL


(Normal)  H








Microbiology





                                  Microbiology








 Date/Time


Source Procedure


Growth Status





 


 3/8/25 16:30


Blood Blood Culture - Final


NO GROWTH AFTER 5 DAYS OF INCUBATION. Complete





 3/6/25 05:30


Peritoneal Fluid Gram Stain - Final


 Complete


 


 3/6/25 05:30


Peritoneal Fluid Body Fluid Culture - Final


 Complete


 


 3/5/25 23:00


Nose MRSA Screen - Final


 Complete











Assessment/Plan


Assessment/Plan


82-year-old female with past medical history of ESRD on PD, AFib diagnosed 

November 21, 2024, chronic abdominal pain, and diarrhea after taking lactulose 

presents after being sent in from urgent care for an abnormal EKG.  Patient 

initially went to urgent care with complaints of chest pain, recent diagnosis of

pneumonia and shortness of breath.  During the emergency department evaluation 

potassium levels found to be 5.7/5.9.  Patient also found to be AFib 130s.  

Patient was recently discharged from [facility] on November 21, 2024 on Eliquis 2.5 and amiodarone 


1. Chest pain MI ruled out


2. Hyperkalemia, improved


3. End-stage renal disease on peritoneal dialysis


4. Hypotension with a relative history of hypertension, today off of vasopressor


5. Chronic abdominal pain


6. Chronic AFib 


7. Hypothyroidism


8. Asthma/COPD 


9. Hyponatremia


-continue current meds.  .  Diet as tolerated, discontinue Clinimix because of 

hyponatremia


-continue IV vasopressor,


-follow up Nephrology, continue  peritoneal dialysis-, hemodialysis access


Plan of care discussed with the bedside RN, patient daughter was updated on the 

phone in the presence of bedside RN


Plan discussed with:  Other





Date of Service:  Mar 19, 2025


Billing Provider:  MARÍA OCONNELL MD


Common Visit Codes:  NOT BILLABLE











MARÍA OCONNELL MD             Mar 19, 2025 16:29

## 2025-03-19 NOTE — DVHPN2
Progress Note - Dictate


Date Seen:  Mar 19, 2025


Medical Necessity Reason


Pt with a Central, PICC or Fol:  Yes


Subjective


Patient seen and examined at bedside.


Breathing comfortably on room air


Overnight events reviewed.


vital signs





                                   Vital Sign








  Date Time  Temp Pulse Resp B/P (MAP) Pulse Ox O2 Delivery O2 Flow Rate FiO2


 


3/19/25 20:45  93 15 107/40 (62) 96   


 


3/19/25 20:00 97.9       





 97.9       


 


3/19/25 20:00      Room Air* 0 21














                           Total Intake and Output   


 


 3/18/25 3/18/25 3/19/25





 15:00 23:00 07:00


 


Intake Total 749.001 ml 754.438 ml 308.501 ml


 


Output Total   400 ml


 


Balance 749.001 ml 754.438 ml -91.499 ml








medications





                               Current Medications








 Medications  Dose


 Ordered  Sig/Anthony


 Route  Start Time


 Stop Time Status Last Admin


Dose Admin


 


 Ondansetron HCl  4 mg  Q4HP  PRN


 IV  3/4/25 17:30


    3/19/25 10:18


4 MG


 


 Morphine Sulfate  2 mg  Q4HPRN  PRN


 IV  3/4/25 17:30


    3/17/25 03:02


2 MG


 


 Morphine Sulfate  2 mg  Q30M  PRN


 IV  3/4/25 17:30


     





 


 Acetaminophen/


 Hydrocodone Bitart  1 tab  Q4HP  PRN


 PO  3/4/25 17:30


    3/18/25 20:50


1 TAB


 


 Acetaminophen  650 mg  Q6HP  PRN


 PO  3/4/25 17:30


    3/19/25 10:18


650 MG


 


 Nitroglycerin  0.4 mg  Q5MINP  PRN


 SL  3/4/25 17:30


     





 


 Levothyroxine


 Sodium  100 mcg  DAILY


 PO  3/5/25 10:00


    3/19/25 10:16


100 MCG


 


 Atorvastatin


 Calcium  10 mg  HS


 PO  3/4/25 22:00


    3/19/25 21:56


10 MG


 


 Docusate Sodium  100 mg  BIDP  PRN


 PO  3/4/25 21:00


     





 


 Budesonide  0.5 mg  BID


 NEB  3/4/25 22:00


    3/19/25 19:08


0.5 MG


 


 Midodrine  10 mg  TID@0600,1200,1800


 PO  3/5/25 06:00


    3/19/25 18:00


10 MG


 


 Levalbuterol HCl  0.625 mg  Q6HR


 NEB  3/6/25 06:00


    3/19/25 19:08


0.625 MG


 


 Phenylephrine HCl


 80 mg/Sodium


 Chloride  250 ml @ 


 7.5 mls/hr  Q24H


 IV  3/6/25 15:00


    3/16/25 16:01


15 MLS/HR


 


 Vancomycin HCl  0 ml @ 0


 mls/hr  UD


 IV  3/8/25 09:00


     





 


 Meropenem  50 ml @ 17


 mls/hr  DAILY


 IV  3/10/25 10:00


    3/19/25 10:16


17 MLS/HR


 


 Norepinephrine


 Bitartrate 32 mg/


 Sodium Chloride  250 ml @ 


 0.938 mls/


 hr  Q24H


 IV  3/9/25 13:45


    3/14/25 09:27


0.938 MLS/HR


 


 Epoetin Kiran-epbx  10,000 unit  MWF@2100


 SC  3/12/25 21:00


    3/19/25 21:52


10,000 UNIT


 


 Diagnostic Test


  (Pha)  1 strip  Q6HR


 VI  3/10/25 18:00


    3/19/25 18:29


1 STRIP


 


 Insulin Human


 Regular  FOLLOW


 SLIDING


 SCALE  Q6HR


 SC  3/10/25 18:00


    3/19/25 05:29


2 UNITS


 


 Dextrose  50 ml  UD


 IV  3/10/25 15:30


     





 


 Pantoprazole


 Sodium  40 mg  BID


 IV  3/11/25 22:00


    3/19/25 21:52


40 MG


 


 Guaifenesin/


 Dextromethorphan  10 ml  Q4HP  PRN


 PO  3/12/25 17:30


    3/14/25 02:29


10 ML


 


 Hydrocortisone


 Sodium Succinate  50 mg  Q6HR


 IV  3/16/25 00:00


    3/19/25 18:00


50 MG


 


 Mupirocin  1 applic  BID


 TOP  3/18/25 22:00


    3/19/25 21:53


1 APPLIC


 


 Amiodarone HCl  200 mg  DAILY


 PO  3/19/25 10:00


    3/19/25 10:16


200 MG








objective


Gen.: Patient lying in bed in no apparent distress. On room air


Head: Normocephalic, atraumatic.


Eyes: EOMI/PERRLA.


Ears: Normal hearing. Normal anatomy.


Neck/trachea: Trachea midline, supple.


Nose: Normal external anatomy.


Mouth: Moist mucous membranes.


Chest: Decreased air entry bilaterally. No wheezing or rhonchi.


Cardiovascular: Positive S1, positive S2. Regular rate and rhythm.


Abdomen: Positive bowel sounds in all 4 quadrants. Soft, non-tender, non-

distended.


: Deferred.


Rectal: Deferred.


Skin: Warm, dry. Intact.


Extremities: 2+ radial pulses bilaterally. No lower extremity edema.


Neuro: Awake, alert, oriented x3. No gross motor or sensory deficits. Cranial 

nerves II through XII intact. Gait not assessed.


laboratory and microbiology


                                Laboratory Tests


3/19/25 14:30








3/19/25 03:30

















Test


 3/19/25


03:30 Range/Units


 


 


Serum Glucose 154 H   mg/dL








Assessment/Plan


Impression:


Acute hypoxic respiratory failure


Shock


Atrial fibrillation, rate controlled


Lactic acidosis


End-stage renal disease, on peritoneal dialysis


Obesity





Events:


Remains on room air.


Supplemental oxygen PRN





Nephrology recommendations appreciated


Plan for hemodialysis


Place large-bore central line for hemodialysis access.





Head of bed elevation


Aspiration precautions





Monitor blood pressure.





Continue antibiotics


Continue steroids - Hydrocortisone 50 mg IV q.6 hours.


Incentive spirometry





Continue Protonix BID


Clinimix for nutritional support





Monitor hemoglobin


Transfuse if less than 7.0 g/dL.





Wound care.





Labs and imaging reviewed.


Rest of plan as noted below.





Plan:


Supplemental oxygen PRN


Titrate to keep O2 sats above 92%.





Incentive spirometry





Continue antibiotics - meropenem


Follow up cultures





Amiodarone PO


Cardiology recs appreciated.





Pressors as necessary for hemodynamic support


Titrate to keep mean arterial pressure greater than 65 mmHg.


Monitor blood pressure





Monitor renal function.


PD per Nephrology


Monitor electrolytes. Supplement as necessary.


Monitor ins and outs.


Nephrology recommendations appreciated





Diet and lifestyle modifications for weight reduction


Obesity - complicates all care





DVT prophylaxis.





Prognosis: Poor given patient's multiple co-morbidities.


Condition: Critical





Rest of plan per hospitalist and other consultants.





A total of 35 minutes of critical care time was spent reviewing the patient 

record, examining the patient, making a diagnostic and therapeutic plan, 

discussing this plan with the medical personnel, following up on diagnostic 

studies and following the patient for clinical stability excluding any and all 

procedures.  At least 50% of this time was spent in direct, face-to-face 

contact.





Thank you, Dr. Platt, for allowing me to participate in this patient's care.


Further recommendations will depend on the patient's clinical course.


Please do not hesitate to contact me if you have any questions or concerns.





This medical document was created using an electronic medical record system with

Dragon computerized dictation system. Although these documentations are being 

carefully reviewed, there may still be some phonetic and typographical changes. 

The errors are purely typographical, due to imperfection on the software 

program, and do not reflect any compromise in the patient's medical care.





Dietary Evaluation Review


Comments:  


Encourage high protein diet. consider Nepro 240ml 19, 432 kcal. PO  


supplements BID


Expected Outcomes/Goals:  


maintain protein levels WNL


Plan discussed with:  Other (BALDO Villarreal)


Critical Care Time(min):  35











JOSEPH SUAREZ MD             Mar 19, 2025 22:08

## 2025-03-19 NOTE — DVHPN2
Progress Note


Date Seen:  Mar 19, 2025


Medical Necessity Reason


Pt with a Central, PICC or Fol:  Yes





Subjective


Patient reports:  Other


Review of Systems:  GI:Abnormal (distended abdomen)





Objective


vital signs





                                   Vital Sign








  Date Time  Temp Pulse Resp B/P (MAP) Pulse Ox O2 Delivery O2 Flow Rate FiO2


 


3/19/25 11:51  89 21  100   


 


3/19/25 10:00      Room Air* 0 21


 


3/19/25 06:45    101/42 (61)    


 


3/19/25 04:00 99.3       





 99.3       














                           Total Intake and Output   


 


 3/18/25 3/18/25 3/19/25





 15:00 23:00 07:00


 


Intake Total 749.001 ml 754.438 ml 266.563 ml


 


Output Total   400 ml


 


Balance 749.001 ml 754.438 ml -133.437 ml








medications





                               Current Medications








 Medications  Dose


 Ordered  Sig/Anthony


 Route  Start Time


 Stop Time Status Last Admin


Dose Admin


 


 Ondansetron HCl  4 mg  Q4HP  PRN


 IV  3/4/25 17:30


    3/19/25 10:18


4 MG


 


 Morphine Sulfate  2 mg  Q4HPRN  PRN


 IV  3/4/25 17:30


    3/17/25 03:02


2 MG


 


 Morphine Sulfate  2 mg  Q30M  PRN


 IV  3/4/25 17:30


     





 


 Acetaminophen/


 Hydrocodone Bitart  1 tab  Q4HP  PRN


 PO  3/4/25 17:30


    3/18/25 20:50


1 TAB


 


 Acetaminophen  650 mg  Q6HP  PRN


 PO  3/4/25 17:30


    3/19/25 10:18


650 MG


 


 Nitroglycerin  0.4 mg  Q5MINP  PRN


 SL  3/4/25 17:30


     





 


 Levothyroxine


 Sodium  100 mcg  DAILY


 PO  3/5/25 10:00


    3/19/25 10:16


100 MCG


 


 Atorvastatin


 Calcium  10 mg  HS


 PO  3/4/25 22:00


    3/18/25 23:10


10 MG


 


 Docusate Sodium  100 mg  BIDP  PRN


 PO  3/4/25 21:00


     





 


 Budesonide  0.5 mg  BID


 NEB  3/4/25 22:00


    3/19/25 06:54


0.5 MG


 


 Midodrine  10 mg  TID@0600,1200,1800


 PO  3/5/25 06:00


    3/19/25 12:43


10 MG


 


 Levalbuterol HCl  0.625 mg  Q6HR


 NEB  3/6/25 06:00


    3/19/25 11:45


0.625 MG


 


 Phenylephrine HCl


 80 mg/Sodium


 Chloride  250 ml @ 


 7.5 mls/hr  Q24H


 IV  3/6/25 15:00


    3/16/25 16:01


15 MLS/HR


 


 Vancomycin HCl  0 ml @ 0


 mls/hr  UD


 IV  3/8/25 09:00


     





 


 Meropenem  50 ml @ 17


 mls/hr  DAILY


 IV  3/10/25 10:00


    3/19/25 10:16


17 MLS/HR


 


 Norepinephrine


 Bitartrate 32 mg/


 Sodium Chloride  250 ml @ 


 0.938 mls/


 hr  Q24H


 IV  3/9/25 13:45


    3/14/25 09:27


0.938 MLS/HR


 


 Epoetin Kiran-epbx  10,000 unit  MWF@2100


 SC  3/12/25 21:00


    3/17/25 22:23


10,000 UNIT


 


 Diagnostic Test


  (Pha)  1 strip  Q6HR


 VI  3/10/25 18:00


    3/19/25 12:00


1 STRIP


 


 Insulin Human


 Regular  FOLLOW


 SLIDING


 SCALE  Q6HR


 SC  3/10/25 18:00


    3/19/25 05:29


2 UNITS


 


 Dextrose  50 ml  UD


 IV  3/10/25 15:30


     





 


 Polyethylene


 Glycol  17 gm  BID


 PO  3/11/25 14:45


    3/18/25 23:10


17 GM


 


 Pantoprazole


 Sodium  40 mg  BID


 IV  3/11/25 22:00


    3/19/25 10:15


40 MG


 


 Guaifenesin/


 Dextromethorphan  10 ml  Q4HP  PRN


 PO  3/12/25 17:30


    3/14/25 02:29


10 ML


 


 Lactulose  30 ml  Q12H


 PO  3/14/25 02:00


    3/16/25 02:23


30 ML


 


 Hydrocortisone


 Sodium Succinate  50 mg  Q6HR


 IV  3/16/25 00:00


    3/19/25 12:43


50 MG


 


 Mupirocin  1 applic  BID


 TOP  3/18/25 22:00


    3/19/25 10:18


1 APPLIC


 


 Amiodarone HCl  200 mg  DAILY


 PO  3/19/25 10:00


    3/19/25 10:16


200 MG








Examination:  GENERAL:Abnormal, ABDOMEN:Abnormal, MSK:Abnormal (edema +), 

SKIN:Abnormal, NEURO:Normal


laboratory and microbiology


                                Laboratory Tests


3/19/25 03:30

















Test


 3/19/25


03:30 Range/Units


 


 


Serum Glucose 154 H   mg/dL








                                  Microbiology








 Date/Time


Source Procedure


Growth Status





 


 3/8/25 16:30


Blood Blood Culture - Final


NO GROWTH AFTER 5 DAYS OF INCUBATION. Complete





 3/6/25 05:30


Peritoneal Fluid Gram Stain - Final


 Complete


 


 3/6/25 05:30


Peritoneal Fluid Body Fluid Culture - Final


 Complete


 


 3/5/25 23:00


Nose MRSA Screen - Final


 Complete











Problem List/Assessment/Plan


Problem List/Assessment/Plan


ESRD on peritoneal dialysis


septic Shock


AFib


Hypokalemia


Hypokalemia likely secondary to diarrhea


Hyponatremia likely secondary to Clinimix mixed in D10


Lactic acidosis








recs


Patient is switched  to CAPD using 7.5 percent Ico dextrin and and 2.5 percent 

Dianeal--continue PD for now however will recommend HD for fluid removal as 

worsening fluid overload 


called daughter 3 times no response 


dc clinimix as sodium going down


--no evidence of peritonitis on cultures /cell count


abx per id 


Pharmacy is not able to mix in a different solution other than D10/D5 for 

Clinimix--- worsening sodium


Hypokalemia secondary to excessive bowel movements---replace potassium daily


k replace prn


off vasopressors


Consider reduction in amiodarone dosing


Needs strict Is&Os charting


Plan discussed with:  Patient, Other





My Orders


My Orders





                           Orders - AUSTEN DENNISON MD








Procedure Category Date Status





  Time 


 


Communication Order ORDERS 3/19/25 Transmitted





  08:19 


 


Strict I & O DARLYN 3/19/25 In Process





  08:21 











Dietary Evaluation Review


Comments:  


Encourage high protein diet. consider Nepro 240ml 19, 432 kcal. PO  


supplements BID


Expected Outcomes/Goals:  


maintain protein levels WNL


Critical Care Time (mins):  45











AUSTEN DENNISON MD             Mar 19, 2025 14:59

## 2025-03-19 NOTE — DVHPN2
Progress Note - Dictate


Date Seen:  Mar 19, 2025


Medical Necessity Reason


Pt with a Central, PICC or Fol:  Yes


Subjective


Patient back on Iam-Synephrine, and low-dose of Levophed.


She is off vasopressors today.


vital signs





                                   Vital Sign








  Date Time  Temp Pulse Resp B/P (MAP) Pulse Ox O2 Delivery O2 Flow Rate FiO2


 


3/19/25 20:45  93 15 107/40 (62) 96   


 


3/19/25 20:00 97.9       





 97.9       


 


3/19/25 20:00      Room Air* 0 21














                           Total Intake and Output   


 


 3/18/25 3/18/25 3/19/25





 15:00 23:00 07:00


 


Intake Total 749.001 ml 754.438 ml 308.501 ml


 


Output Total   400 ml


 


Balance 749.001 ml 754.438 ml -91.499 ml








medications





                               Current Medications








 Medications  Dose


 Ordered  Sig/Anthony


 Route  Start Time


 Stop Time Status Last Admin


Dose Admin


 


 Ondansetron HCl  4 mg  Q4HP  PRN


 IV  3/4/25 17:30


    3/19/25 10:18


4 MG


 


 Morphine Sulfate  2 mg  Q4HPRN  PRN


 IV  3/4/25 17:30


    3/17/25 03:02


2 MG


 


 Morphine Sulfate  2 mg  Q30M  PRN


 IV  3/4/25 17:30


     





 


 Acetaminophen/


 Hydrocodone Bitart  1 tab  Q4HP  PRN


 PO  3/4/25 17:30


    3/18/25 20:50


1 TAB


 


 Acetaminophen  650 mg  Q6HP  PRN


 PO  3/4/25 17:30


    3/19/25 10:18


650 MG


 


 Nitroglycerin  0.4 mg  Q5MINP  PRN


 SL  3/4/25 17:30


     





 


 Levothyroxine


 Sodium  100 mcg  DAILY


 PO  3/5/25 10:00


    3/19/25 10:16


100 MCG


 


 Atorvastatin


 Calcium  10 mg  HS


 PO  3/4/25 22:00


    3/18/25 23:10


10 MG


 


 Docusate Sodium  100 mg  BIDP  PRN


 PO  3/4/25 21:00


     





 


 Budesonide  0.5 mg  BID


 NEB  3/4/25 22:00


    3/19/25 19:08


0.5 MG


 


 Midodrine  10 mg  TID@0600,1200,1800


 PO  3/5/25 06:00


    3/19/25 18:00


10 MG


 


 Levalbuterol HCl  0.625 mg  Q6HR


 NEB  3/6/25 06:00


    3/19/25 19:08


0.625 MG


 


 Phenylephrine HCl


 80 mg/Sodium


 Chloride  250 ml @ 


 7.5 mls/hr  Q24H


 IV  3/6/25 15:00


    3/16/25 16:01


15 MLS/HR


 


 Vancomycin HCl  0 ml @ 0


 mls/hr  UD


 IV  3/8/25 09:00


     





 


 Meropenem  50 ml @ 17


 mls/hr  DAILY


 IV  3/10/25 10:00


    3/19/25 10:16


17 MLS/HR


 


 Norepinephrine


 Bitartrate 32 mg/


 Sodium Chloride  250 ml @ 


 0.938 mls/


 hr  Q24H


 IV  3/9/25 13:45


    3/14/25 09:27


0.938 MLS/HR


 


 Epoetin Kiran-epbx  10,000 unit  MWF@2100


 SC  3/12/25 21:00


    3/17/25 22:23


10,000 UNIT


 


 Diagnostic Test


  (Pha)  1 strip  Q6HR


 VI  3/10/25 18:00


    3/19/25 18:29


1 STRIP


 


 Insulin Human


 Regular  FOLLOW


 SLIDING


 SCALE  Q6HR


 SC  3/10/25 18:00


    3/19/25 05:29


2 UNITS


 


 Dextrose  50 ml  UD


 IV  3/10/25 15:30


     





 


 Pantoprazole


 Sodium  40 mg  BID


 IV  3/11/25 22:00


    3/19/25 10:15


40 MG


 


 Guaifenesin/


 Dextromethorphan  10 ml  Q4HP  PRN


 PO  3/12/25 17:30


    3/14/25 02:29


10 ML


 


 Hydrocortisone


 Sodium Succinate  50 mg  Q6HR


 IV  3/16/25 00:00


    3/19/25 18:00


50 MG


 


 Mupirocin  1 applic  BID


 TOP  3/18/25 22:00


    3/19/25 10:18


1 APPLIC


 


 Amiodarone HCl  200 mg  DAILY


 PO  3/19/25 10:00


    3/19/25 10:16


200 MG








objective


General Appearance:  Alert, , mild distress


HEENT:  Atraumatic, PERRLA, EOMI


Respiratory:  Clear to auscultation, Normal air movement


Cardiovascular:  Normal S1, Normal S2, Other (afib)


Abdominal:  non tender. 


Extremities:  No clubbing, No cyanosis, Normal pulses ,edema +


Skin:  No rashes, No breakdown


Neuro:  grossly intact


laboratory and microbiology


                                Laboratory Tests


3/19/25 14:30








3/19/25 03:30

















Test


 3/19/25


03:30 Range/Units


 


 


Serum Glucose 154 H   mg/dL








Assessment/Plan


Patient is a 82-year-old female presents to the hospital with:





Hypotension/Shock


Lactic Acidosis 


Afib uncontrolled


Chest pain


Hyperkalemia


ESRD on PD


Chronic abdominal pain


Diarrhea 





Recommendations:


Patient is on low-dose Levophed. She is on 2 pressors. 





Continue Vancomycin/ Meropenem empirically


Pulm/ crit care on board


follow blood culture: no growth


no clear source of infection





hypotension is likely multifactorial cardiac due to Afib with rvr vs medication 

induced/ acidosis





s/p  Peritoneal fluid analysis: cell count not qualifying for peritonitis. 

cultures are also negative 








Antibiotic status:


Meropenem IV [Restarted on 03/10]


Vancomycin IV [Started on 03/05 - 03/08]





03/19, Vancomycin Random is 15.0





03/04, Abdomen Pelvis CT showed No acute intra-abdominal finding. Isodense 

lesion of the right mid kidney measuring 1.2 cm, attention on follow-up is 

recommended.  


Compression fracture of L2 and possibly superior endplate of L1 with mild 

posterior extension resulting in mild spinal canal stenosis, age-indeterminate  





03/04, Blood culture showed no growth





03/06, Fluid culture preliminary showed no growth





03/05, MRSA screening negative





critical care time 35 minutes which includes assessment, coordinating plan with 

nurse and consultants. 





prognosis poor


plan discussed with RN





Thank you for consult.





Dietary Evaluation Review


Comments:  


Encourage high protein diet. consider Nepro 240ml 19, 432 kcal. PO  


supplements BID


Expected Outcomes/Goals:  


maintain protein levels WNL











GELA ELLISON MD            Mar 19, 2025 21:07

## 2025-03-20 NOTE — DVHNC2
Procedure


-


Femoral large-bore Central Line Procedure Note 


INDICATION: Renal failure, requiring hemodialysis


PROCEDURE : Dr. Charlton





Ultrasound Used: Y 





CONSENT:  Consent was obtained from patient's healthcare proxy prior to the 

procedure. Indications, risks, and benefits were explained at length. 





PROCEDURE SUMMARY: 


A time out was performed. My hands were washed immediately prior to the 

procedure. I wore a surgical cap, mask with protective eyewear, sterile gown and

sterile gloves throughout the procedure. The LEFT inguinal region was prepped 

using chlorhexidine scrub and draped in sterile fashion using a full drape and 

sterile probe cover and sterile gel employed. The femoral pulse was identified. 

Anesthesia was achieved using 1% lidocaine.  Under ultrasound guidance and 

palpating the femoral pulse throughout the procedure, the introducer needle was 

inserted medial to the femoral artery, inferior to the inguinal crease and into 

the femoral vein. Venous blood was withdrawn. The syringe was removed and a 

guidewire was advanced into the introducer needle. A small incision was made at 

the skin surface with a scalpel and the introducer needle was exchanged for a 

dilator over the guidewire. After appropriate dilation was obtained, the dilator

was exchanged over the wire for a double lumen large bore central venous 

catheter. The wire was removed and the catheter was sutured in place at 20 cm. A

sterile Biopatch was placed over the catheter at the insertion site. The patient

tolerated the procedure without any hemodynamic compromise. At time of procedure

completion, all ports aspirated and flushed properly.  Ultrasound image 

documenting the guidewire within the LEFT femoral are placed in the patient's 

chart.





Estimated blood loss is 5mL.





78867 (ultrasound guidance)


15698 (insertion of non-tunneled centrally inserted central venous catheter)











JOSEPH CHARLTON MD             Mar 20, 2025 17:53

## 2025-03-20 NOTE — DVH
CHEST RADIOGRAPH



Indication: pna



Technique: Single frontal view of the chest was obtained



Comparison: XY CHEST PORTABLE on DOS: 3/14/25, XY CHEST PORTABLE on DOS: 3/10/25, XY CHEST XRAY 1 VIE
W on DOS: 3/6/25



FINDINGS:



Lines and Tubes: Right internal jugular catheter in place not significantly changed from March 14, 20
25



Lungs: Slightly improving left basilar infiltrate.



Pleura: No effusion.



No pneumothorax. 



Cardiomediastinal contours: Unremarkable



Bones: No acute osseous abnormality.



IMPRESSION: 



1. No significant change in position of the right internal jugular catheter



2. Slightly improving infiltrate left base



Electronically Signed by: Simon Seaman at 03/20/2025 20:12:57 PM

## 2025-03-20 NOTE — DVHPN2
Subjective


Overnight events noted.  Patient patient currently on Levophed at 12 mics, 

hemodialysis catheter replaced and patient got hemodialysis today


Reviewed:  Care Plan


Changes from previous H/P or p:  No Changes


Eyes:  No Pain, No Vision change, No Conjunctivae inflammation, No Eyelid 

inflammation, No Other, No Redness


ENT:  No Ear pain, No Ear discharge, No Nose pain, No Nose discharge, No Nose 

congestion, No Mouth pain, No Mouth swelling, No Throat pain, No Throat 

swelling, No Other


Cardiovascular:  Chest Pain; No Palpitations, No Orthopnea, No Paroxysmal Noc. 

Dyspnea, No Edema, No Lt Headedness, No Other


Respiratory:  No Cough, No Dry; Shortness of breath; No SOB with excertion, No 

Wheezing, No Hemoptysis, No Pleuritic Pain, No Sputum, No Other


Gastrointestinal:  No Nausea, No Vomiting; Abdominal Pain, Diarrhea; No 

Constipation, No Melena, No Hematochezia, No Other


Genitourinary:  No Dysuria, No Frequency, No Incontinence, No Hematuria, No 

Retention, No Other


Musculoskeletal:  No other, No neck pain, No shoulder pain, No arm pain, No back

pain, No hand pain, No leg pain, No foot pain


Skin:  No Rash, No Lesions, No Jaundice, No Bruising, No Other





Objective


Vitals





Vital Signs








  Date Time  Temp Pulse Resp B/P (MAP) Pulse Ox O2 Delivery O2 Flow Rate FiO2


 


3/20/25 16:28   15  96 Nasal Cannula* 2 28


 


3/20/25 16:15 97.9 88  113/37 (62)    





 97.9       








Intake/Output











                               Intake and Output 


 


 3/20/25





 07:00


 


Intake Total 1141.440 ml


 


Output Total 0 ml


 


Balance 1141.440 ml


 


 


 


Intake Oral 873 ml


 


IV Total 268.440 ml


 


Output Urine Total 0 ml


 


# Bowel Movements 3








Exam


HEENT pupils are reactive 


Neck is supple 


CV is S1-S2 regular rate and rhythm 


Respiratory diminished breath sounds bases 


GI positive bowel sound


Extremity bilateral pitting edema right more than


CNS no motor deficits, physical deconditioned


Medications





                               Current Medications








 Medications  Dose


 Ordered  Sig/Anthony


 Route  Start Time


 Stop Time Status Last Admin


Dose Admin


 


 Ondansetron HCl  4 mg  Q4HP  PRN


 IV  3/4/25 17:30


    3/19/25 10:18


4 MG


 


 Morphine Sulfate  2 mg  Q4HPRN  PRN


 IV  3/4/25 17:30


    3/17/25 03:02


2 MG


 


 Morphine Sulfate  2 mg  Q30M  PRN


 IV  3/4/25 17:30


     





 


 Acetaminophen/


 Hydrocodone Bitart  1 tab  Q4HP  PRN


 PO  3/4/25 17:30


    3/20/25 15:11


1 TAB


 


 Acetaminophen  650 mg  Q6HP  PRN


 PO  3/4/25 17:30


    3/19/25 10:18


650 MG


 


 Nitroglycerin  0.4 mg  Q5MINP  PRN


 SL  3/4/25 17:30


     





 


 Levothyroxine


 Sodium  100 mcg  DAILY


 PO  3/5/25 10:00


    3/20/25 15:00


100 MCG


 


 Atorvastatin


 Calcium  10 mg  HS


 PO  3/4/25 22:00


    3/19/25 21:56


10 MG


 


 Docusate Sodium  100 mg  BIDP  PRN


 PO  3/4/25 21:00


     





 


 Budesonide  0.5 mg  BID


 NEB  3/4/25 22:00


    3/20/25 07:27


0.5 MG


 


 Midodrine  10 mg  TID@0600,1200,1800


 PO  3/5/25 06:00


    3/20/25 15:00


10 MG


 


 Levalbuterol HCl  0.625 mg  Q6HR


 NEB  3/6/25 06:00


    3/20/25 12:24


0.625 MG


 


 Phenylephrine HCl


 80 mg/Sodium


 Chloride  250 ml @ 


 7.5 mls/hr  Q24H


 IV  3/6/25 15:00


    3/16/25 16:01


15 MLS/HR


 


 Vancomycin HCl  0 ml @ 0


 mls/hr  UD


 IV  3/8/25 09:00


     





 


 Meropenem  50 ml @ 17


 mls/hr  DAILY


 IV  3/10/25 10:00


    3/20/25 14:42


17 MLS/HR


 


 Norepinephrine


 Bitartrate 32 mg/


 Sodium Chloride  250 ml @ 


 0.938 mls/


 hr  Q24H


 IV  3/9/25 13:45


    3/20/25 13:45


5.625 MLS/HR


 


 Epoetin Kiran-epbx  10,000 unit  MWF@2100


 SC  3/12/25 21:00


    3/19/25 21:52


10,000 UNIT


 


 Diagnostic Test


  (Pha)  1 strip  Q6HR


 VI  3/10/25 18:00


    3/20/25 12:16


1 STRIP


 


 Insulin Human


 Regular  FOLLOW


 SLIDING


 SCALE  Q6HR


 SC  3/10/25 18:00


    3/19/25 05:29


2 UNITS


 


 Dextrose  50 ml  UD


 IV  3/10/25 15:30


     





 


 Pantoprazole


 Sodium  40 mg  BID


 IV  3/11/25 22:00


    3/20/25 14:54


40 MG


 


 Guaifenesin/


 Dextromethorphan  10 ml  Q4HP  PRN


 PO  3/12/25 17:30


    3/14/25 02:29


10 ML


 


 Hydrocortisone


 Sodium Succinate  50 mg  Q6HR


 IV  3/16/25 00:00


    3/20/25 14:56


50 MG


 


 Mupirocin  1 applic  BID


 TOP  3/18/25 22:00


    3/20/25 15:00


1 APPLIC


 


 Amiodarone HCl  200 mg  DAILY


 PO  3/19/25 10:00


    3/20/25 15:00


200 MG


 


 Albumin Human  100 ml @ 


 100 mls/hr  KRISTI


 IV  3/20/25 10:30


 3/22/25 11:29  3/20/25 13:24


100 MLS/HR











Laboratory Results


Laboratory Tests


3/20/25 03:15











Chemistry








Test


 3/20/25


03:15


 


Calcium Level


 8.8 mg/dL


(8.7-10.4)








Urinalysis








Test


 3/4/25


16:30


 


Urine Color


 Colorless


(Yellow)


 


Urine Clarity Clear (Clear)  


 


Urine pH 7.5 (5.0-9.0)  


 


Urine Specific Gravity


 1.007


(1.001-1.035)


 


Urine Protein


 1+ (Negative)


H


 


Urine Ketones


 Negative


(Negative)


 


Urine Blood


 Negative /uL


(Negative)


 


Urine Nitrite


 Negative


(Negative)


 


Urine Bilirubin


 Negative


(Negative)


 


Urine Urobilinogen


 Normal mg/dL


(Negative)


 


Urine Leukocyte Esterase


 Negative /uL


(Negative)


 


Urine RBC


 6 /hpf (0 - 4)





 


Urine Microscopic WBC 3 /HPF (0-5)  


 


Urine Squamous Epithelial


Cells None seen /hpf


(<5)


 


Urine Bacteria


 None seen /hpf


(None Seen)


 


Urine Mucus


 Few (None


Seen)


 


Urine Glucose


 4+ mg/dL


(Normal)  H








Microbiology





                                  Microbiology








 Date/Time


Source Procedure


Growth Status





 


 3/8/25 16:30


Blood Blood Culture - Final


NO GROWTH AFTER 5 DAYS OF INCUBATION. Complete





 3/6/25 05:30


Peritoneal Fluid Gram Stain - Final


 Complete


 


 3/6/25 05:30


Peritoneal Fluid Body Fluid Culture - Final


 Complete


 


 3/5/25 23:00


Nose MRSA Screen - Final


 Complete











Assessment/Plan


Assessment/Plan


82-year-old female with past medical history of ESRD on PD, AFib diagnosed 

November 21, 2024, chronic abdominal pain, and diarrhea after taking lactulose 

presents after being sent in from urgent care for an abnormal EKG.  Patient 

initially went to urgent care with complaints of chest pain, recent diagnosis of

pneumonia and shortness of breath.  During the emergency department evaluation 

potassium levels found to be 5.7/5.9.  Patient also found to be AFib 130s.  

Patient was recently discharged from [facility] on November 21, 2024 on Eliquis 2.5 and amiodarone 


1. Chest pain MI ruled out


2. Hyperkalemia, improved


3. End-stage renal disease on peritoneal dialysis


4. Hypotension with a relative history of hypertension, today off of vasopressor


5. Chronic abdominal pain


6. Chronic AFib 


7. Hypothyroidism


8. Asthma/COPD 


9. Hyponatremia, Clinimix discontinue


-continue current meds.  .  Diet as tolerated, discontinue Clinimix because of 

hyponatremia


-continue IV vasopressor, dietitian for systolic blood pressure more than 90


-follow up Nephrology, continue hemodialysis


Plan of care discussed with the bedside RNTana.


-patient care will be transferred to Dr. Platt who will be managing the patient 

from tomorrow onwards.


Plan discussed with:  Patient, Other (Pictures bedside BALDO Fajardo)





Date of Service:  Mar 20, 2025


Billing Provider:  MARÍA OCONNELL MD


Common Visit Codes:  NOT BILLABLE











MARÍA OCONNELL MD             Mar 20, 2025 17:33

## 2025-03-20 NOTE — DVHPN2
Progress Note - Dictate


Date Seen:  Mar 20, 2025


Medical Necessity Reason


Pt with a Central, PICC or Fol:  Yes


Subjective


No GI bleeding


Hemoglobin stable at 9.3


S/P femoral dialysis catheter placement for dialysis access


Currently on peritoneal dialysis


vital signs





                                   Vital Sign








  Date Time  Temp Pulse Resp B/P (MAP) Pulse Ox O2 Delivery O2 Flow Rate FiO2


 


3/20/25 21:54  89      


 


3/20/25 21:54   19  99 Nasal Cannula* 2 28


 


3/20/25 21:45    118/49 (72)    


 


3/20/25 20:15 98.1       





 98.1       














                           Total Intake and Output   


 


 3/19/25 3/19/25 3/20/25





 15:00 23:00 07:00


 


Intake Total 218.752 ml 442.688 ml 480.00 ml


 


Output Total  0 ml 


 


Balance 218.752 ml 442.688 ml 480.00 ml








medications





                               Current Medications








 Medications  Dose


 Ordered  Sig/Anthony


 Route  Start Time


 Stop Time Status Last Admin


Dose Admin


 


 Ondansetron HCl  4 mg  Q4HP  PRN


 IV  3/4/25 17:30


    3/19/25 10:18


4 MG


 


 Morphine Sulfate  2 mg  Q4HPRN  PRN


 IV  3/4/25 17:30


    3/17/25 03:02


2 MG


 


 Morphine Sulfate  2 mg  Q30M  PRN


 IV  3/4/25 17:30


     





 


 Acetaminophen/


 Hydrocodone Bitart  1 tab  Q4HP  PRN


 PO  3/4/25 17:30


    3/20/25 15:11


1 TAB


 


 Acetaminophen  650 mg  Q6HP  PRN


 PO  3/4/25 17:30


    3/19/25 10:18


650 MG


 


 Nitroglycerin  0.4 mg  Q5MINP  PRN


 SL  3/4/25 17:30


     





 


 Levothyroxine


 Sodium  100 mcg  DAILY


 PO  3/5/25 10:00


    3/20/25 15:00


100 MCG


 


 Atorvastatin


 Calcium  10 mg  HS


 PO  3/4/25 22:00


    3/20/25 21:39


10 MG


 


 Docusate Sodium  100 mg  BIDP  PRN


 PO  3/4/25 21:00


     





 


 Budesonide  0.5 mg  BID


 NEB  3/4/25 22:00


    3/20/25 18:41


0.5 MG


 


 Midodrine  10 mg  TID@0600,1200,1800


 PO  3/5/25 06:00


    3/20/25 15:00


10 MG


 


 Levalbuterol HCl  0.625 mg  Q6HR


 NEB  3/6/25 06:00


    3/20/25 18:41


0.625 MG


 


 Phenylephrine HCl


 80 mg/Sodium


 Chloride  250 ml @ 


 7.5 mls/hr  Q24H


 IV  3/6/25 15:00


    3/16/25 16:01


15 MLS/HR


 


 Vancomycin HCl  0 ml @ 0


 mls/hr  UD


 IV  3/8/25 09:00


     





 


 Meropenem  50 ml @ 17


 mls/hr  DAILY


 IV  3/10/25 10:00


    3/20/25 14:42


17 MLS/HR


 


 Norepinephrine


 Bitartrate 32 mg/


 Sodium Chloride  250 ml @ 


 0.938 mls/


 hr  Q24H


 IV  3/9/25 13:45


    3/20/25 13:45


5.625 MLS/HR


 


 Epoetin Kiran-epbx  10,000 unit  MWF@2100


 SC  3/12/25 21:00


    3/19/25 21:52


10,000 UNIT


 


 Diagnostic Test


  (Pha)  1 strip  Q6HR


 VI  3/10/25 18:00


    3/20/25 17:48


1 STRIP


 


 Insulin Human


 Regular  FOLLOW


 SLIDING


 SCALE  Q6HR


 SC  3/10/25 18:00


    3/19/25 05:29


2 UNITS


 


 Dextrose  50 ml  UD


 IV  3/10/25 15:30


     





 


 Pantoprazole


 Sodium  40 mg  BID


 IV  3/11/25 22:00


    3/20/25 21:39


40 MG


 


 Guaifenesin/


 Dextromethorphan  10 ml  Q4HP  PRN


 PO  3/12/25 17:30


    3/14/25 02:29


10 ML


 


 Hydrocortisone


 Sodium Succinate  50 mg  Q6HR


 IV  3/16/25 00:00


    3/20/25 14:56


50 MG


 


 Mupirocin  1 applic  BID


 TOP  3/18/25 22:00


    3/20/25 21:40


1 APPLIC


 


 Amiodarone HCl  200 mg  DAILY


 PO  3/19/25 10:00


    3/20/25 15:00


200 MG


 


 Albumin Human  100 ml @ 


 100 mls/hr  KRISTI


 IV  3/20/25 10:30


 3/22/25 11:29  3/20/25 13:24


100 MLS/HR








objective


GENERAL:  Not in acute distress.  


HEENT: EOMI,  Moist mucous membranes.  No scleral icterus.  No cervical 

lymphadenopathy.


LUNGS: Clear to auscultation bilaterally.  No accessory muscle use.


CARDIOVASCULAR: Regular rate and rhythm.  No murmur.  No JVD.


ABDOMEN: Mild-to-moderate periumbilical tenderness to palpation, +BS, ABD is 

distended


EXTREMITIES: No edema.  Nontender.


SKIN: No rashes or lesions.  Warm.


NEUROLOGIC:  Alert and oriented X3.


laboratory and microbiology


                                Laboratory Tests


3/20/25 18:50








3/20/25 03:15

















Test


 3/20/25


03:15 Range/Units


 


 


Serum Glucose 101    mg/dL








Problems(with codes):  


(1) Abdominal pain


(2) ESRD (end stage renal disease) on dialysis


(3) History of peritoneal dialysis


(4) Chronic kidney disease


(5) Acute abdominal pain


Prognosis


- PLAN





SOBT negative


- s/p 1 unit PRBC :hemoglobin is stable now.  today hemoglobin is 9.3


- Monitor H&H, transfuse if hemoglobin less than 7.0


- Colace b.i.d., consider use of lactulose if no bowel movements


- Plan for EGD when  patient is stable; continue conservative management for now


- Continue Protonix 40 mg  b.i.d.


- Cont. MiraLax


-





Dietary Evaluation Review


Comments:  


Encourage high protein diet. consider Nepro 240ml 19, 432 kcal. PO  


supplements BID


Expected Outcomes/Goals:  


maintain protein levels WNL


Plan discussed with:  Other (Dr Pink)











ISABELLE CARLSON MD                Mar 20, 2025 22:19

## 2025-03-20 NOTE — DVHPN2
Progress Note


Date Seen:  Mar 20, 2025


Medical Necessity Reason


Pt with a Central, PICC or Fol:  Yes





Subjective


Patient reports:  Other (lethargic)


Review of Systems:  Deferred





Objective


vital signs





                                   Vital Sign








  Date Time  Temp Pulse Resp B/P (MAP) Pulse Ox O2 Delivery O2 Flow Rate FiO2


 


3/20/25 09:37  108 22 118/35 100  0.0 21


 


3/20/25 04:00 97.2       





 97.2       


 


3/20/25 00:46      Room Air  














                           Total Intake and Output   


 


 3/19/25 3/19/25 3/20/25





 15:00 23:00 07:00


 


Intake Total 218.752 ml 442.688 ml 476.25 ml


 


Output Total  0 ml 


 


Balance 218.752 ml 442.688 ml 476.25 ml








medications





                               Current Medications








 Medications  Dose


 Ordered  Sig/Anthony


 Route  Start Time


 Stop Time Status Last Admin


Dose Admin


 


 Ondansetron HCl  4 mg  Q4HP  PRN


 IV  3/4/25 17:30


    3/19/25 10:18


4 MG


 


 Morphine Sulfate  2 mg  Q4HPRN  PRN


 IV  3/4/25 17:30


    3/17/25 03:02


2 MG


 


 Morphine Sulfate  2 mg  Q30M  PRN


 IV  3/4/25 17:30


     





 


 Acetaminophen/


 Hydrocodone Bitart  1 tab  Q4HP  PRN


 PO  3/4/25 17:30


    3/18/25 20:50


1 TAB


 


 Acetaminophen  650 mg  Q6HP  PRN


 PO  3/4/25 17:30


    3/19/25 10:18


650 MG


 


 Nitroglycerin  0.4 mg  Q5MINP  PRN


 SL  3/4/25 17:30


     





 


 Levothyroxine


 Sodium  100 mcg  DAILY


 PO  3/5/25 10:00


    3/19/25 10:16


100 MCG


 


 Atorvastatin


 Calcium  10 mg  HS


 PO  3/4/25 22:00


    3/19/25 21:56


10 MG


 


 Docusate Sodium  100 mg  BIDP  PRN


 PO  3/4/25 21:00


     





 


 Budesonide  0.5 mg  BID


 NEB  3/4/25 22:00


    3/20/25 07:27


0.5 MG


 


 Midodrine  10 mg  TID@0600,1200,1800


 PO  3/5/25 06:00


    3/20/25 05:43


10 MG


 


 Levalbuterol HCl  0.625 mg  Q6HR


 NEB  3/6/25 06:00


    3/20/25 07:27


0.625 MG


 


 Phenylephrine HCl


 80 mg/Sodium


 Chloride  250 ml @ 


 7.5 mls/hr  Q24H


 IV  3/6/25 15:00


    3/16/25 16:01


15 MLS/HR


 


 Vancomycin HCl  0 ml @ 0


 mls/hr  UD


 IV  3/8/25 09:00


     





 


 Meropenem  50 ml @ 17


 mls/hr  DAILY


 IV  3/10/25 10:00


    3/19/25 10:16


17 MLS/HR


 


 Norepinephrine


 Bitartrate 32 mg/


 Sodium Chloride  250 ml @ 


 0.938 mls/


 hr  Q24H


 IV  3/9/25 13:45


    3/14/25 09:27


0.938 MLS/HR


 


 Epoetin Kiran-epbx  10,000 unit  MWF@2100


 SC  3/12/25 21:00


    3/19/25 21:52


10,000 UNIT


 


 Diagnostic Test


  (Pha)  1 strip  Q6HR


 VI  3/10/25 18:00


    3/20/25 05:44


1 STRIP


 


 Insulin Human


 Regular  FOLLOW


 SLIDING


 SCALE  Q6HR


 SC  3/10/25 18:00


    3/19/25 05:29


2 UNITS


 


 Dextrose  50 ml  UD


 IV  3/10/25 15:30


     





 


 Pantoprazole


 Sodium  40 mg  BID


 IV  3/11/25 22:00


    3/19/25 21:52


40 MG


 


 Guaifenesin/


 Dextromethorphan  10 ml  Q4HP  PRN


 PO  3/12/25 17:30


    3/14/25 02:29


10 ML


 


 Hydrocortisone


 Sodium Succinate  50 mg  Q6HR


 IV  3/16/25 00:00


    3/20/25 05:44


50 MG


 


 Mupirocin  1 applic  BID


 TOP  3/18/25 22:00


    3/19/25 21:53


1 APPLIC


 


 Amiodarone HCl  200 mg  DAILY


 PO  3/19/25 10:00


    3/19/25 10:16


200 MG


 


 Albumin Human  100 ml @ 


 100 mls/hr  KRISTI


 IV  3/20/25 10:30


 3/22/25 11:29   











Examination:  GENERAL:Abnormal, LUNGS:Abnormal (rales), ABDOMEN:Abnormal, 

MSK:Abnormal, SKIN:Abnormal, NEURO:Normal


laboratory and microbiology


                                Laboratory Tests


3/20/25 03:15

















Test


 3/20/25


03:15 Range/Units


 


 


Serum Glucose 101    mg/dL








                                  Microbiology








 Date/Time


Source Procedure


Growth Status





 


 3/8/25 16:30


Blood Blood Culture - Final


NO GROWTH AFTER 5 DAYS OF INCUBATION. Complete





 3/6/25 05:30


Peritoneal Fluid Gram Stain - Final


 Complete


 


 3/6/25 05:30


Peritoneal Fluid Body Fluid Culture - Final


 Complete


 


 3/5/25 23:00


Nose MRSA Screen - Final


 Complete











Problem List/Assessment/Plan


Problem List/Assessment/Plan


ESRD on peritoneal dialysis


septic Shock


AFib


Hypokalemia


Hypokalemia likely secondary to diarrhea


Hyponatremia likely secondary to volume overload 


Lactic acidosis








recs


switching to HD for worsening volume overload --s/p femoral hanna 


s/p  CAPD using 7.5 percent Ico dextrin and and 2.5 percent Dianeal--  even with

icodextrin no significant uf 


dc clinimix d10w as sodium going down


--no evidence of peritonitis on cultures /cell count


abx per id 


k replace prn


on  vasopressors--levo at 8


d/w daughter


Plan discussed with:  Patient, Daughter, Other





My Orders


My Orders





                           Orders - AUSTEN DENNISON MD








Procedure Category Date Status





  Time 


 


Obtain Consent For: ORDERS 3/19/25 Transmitted





  18:34 


 


Obtain Consent For: ORDERS 3/19/25 Transmitted





  18:34 


 


Us Guided Vascular US 3/19/25 Logged





Access  18:34 


 


Dialysis Nursing DARLYN 3/20/25 In Process





Message  07:00 


 


Chest Xray 1 View XY 3/20/25 Logged





  09:14 


 


Hemodialysis Orders ORDERS 3/20/25 Transmitted





  09:15 


 


Albumin 25% (Albutein) PHA 3/20/25 In Process





  10:30 











Dietary Evaluation Review


Comments:  


Encourage high protein diet. consider Nepro 240ml 19, 432 kcal. PO  


supplements BID


Expected Outcomes/Goals:  


maintain protein levels WNL


Critical Care Time (mins):  48











AUSTEN DENNISON MD             Mar 20, 2025 11:10

## 2025-03-20 NOTE — DVHPN2
Progress Note - Dictate


Date Seen:  Mar 20, 2025


Medical Necessity Reason


Pt with a Central, PICC or Fol:  Yes


Subjective


Patient seen and examined at bedside.


Breathing comfortably on room air


Overnight events reviewed.


vital signs





                                   Vital Sign








  Date Time  Temp Pulse Resp B/P (MAP) Pulse Ox O2 Delivery O2 Flow Rate FiO2


 


3/20/25 20:30  89      


 


3/20/25 20:30   21  98 Nasal Cannula* 2 28


 


3/20/25 20:00    100/34 (56)    


 


3/20/25 16:15 97.9       





 97.9       














                           Total Intake and Output   


 


 3/19/25 3/19/25 3/20/25





 15:00 23:00 07:00


 


Intake Total 218.752 ml 442.688 ml 480.00 ml


 


Output Total  0 ml 


 


Balance 218.752 ml 442.688 ml 480.00 ml








medications





                               Current Medications








 Medications  Dose


 Ordered  Sig/Anthony


 Route  Start Time


 Stop Time Status Last Admin


Dose Admin


 


 Ondansetron HCl  4 mg  Q4HP  PRN


 IV  3/4/25 17:30


    3/19/25 10:18


4 MG


 


 Morphine Sulfate  2 mg  Q4HPRN  PRN


 IV  3/4/25 17:30


    3/17/25 03:02


2 MG


 


 Morphine Sulfate  2 mg  Q30M  PRN


 IV  3/4/25 17:30


     





 


 Acetaminophen/


 Hydrocodone Bitart  1 tab  Q4HP  PRN


 PO  3/4/25 17:30


    3/20/25 15:11


1 TAB


 


 Acetaminophen  650 mg  Q6HP  PRN


 PO  3/4/25 17:30


    3/19/25 10:18


650 MG


 


 Nitroglycerin  0.4 mg  Q5MINP  PRN


 SL  3/4/25 17:30


     





 


 Levothyroxine


 Sodium  100 mcg  DAILY


 PO  3/5/25 10:00


    3/20/25 15:00


100 MCG


 


 Atorvastatin


 Calcium  10 mg  HS


 PO  3/4/25 22:00


    3/19/25 21:56


10 MG


 


 Docusate Sodium  100 mg  BIDP  PRN


 PO  3/4/25 21:00


     





 


 Budesonide  0.5 mg  BID


 NEB  3/4/25 22:00


    3/20/25 18:41


0.5 MG


 


 Midodrine  10 mg  TID@0600,1200,1800


 PO  3/5/25 06:00


    3/20/25 15:00


10 MG


 


 Levalbuterol HCl  0.625 mg  Q6HR


 NEB  3/6/25 06:00


    3/20/25 18:41


0.625 MG


 


 Phenylephrine HCl


 80 mg/Sodium


 Chloride  250 ml @ 


 7.5 mls/hr  Q24H


 IV  3/6/25 15:00


    3/16/25 16:01


15 MLS/HR


 


 Vancomycin HCl  0 ml @ 0


 mls/hr  UD


 IV  3/8/25 09:00


     





 


 Meropenem  50 ml @ 17


 mls/hr  DAILY


 IV  3/10/25 10:00


    3/20/25 14:42


17 MLS/HR


 


 Norepinephrine


 Bitartrate 32 mg/


 Sodium Chloride  250 ml @ 


 0.938 mls/


 hr  Q24H


 IV  3/9/25 13:45


    3/20/25 13:45


5.625 MLS/HR


 


 Epoetin Kiran-epbx  10,000 unit  MWF@2100


 SC  3/12/25 21:00


    3/19/25 21:52


10,000 UNIT


 


 Diagnostic Test


  (Pha)  1 strip  Q6HR


 VI  3/10/25 18:00


    3/20/25 17:48


1 STRIP


 


 Insulin Human


 Regular  FOLLOW


 SLIDING


 SCALE  Q6HR


 SC  3/10/25 18:00


    3/19/25 05:29


2 UNITS


 


 Dextrose  50 ml  UD


 IV  3/10/25 15:30


     





 


 Pantoprazole


 Sodium  40 mg  BID


 IV  3/11/25 22:00


    3/20/25 14:54


40 MG


 


 Guaifenesin/


 Dextromethorphan  10 ml  Q4HP  PRN


 PO  3/12/25 17:30


    3/14/25 02:29


10 ML


 


 Hydrocortisone


 Sodium Succinate  50 mg  Q6HR


 IV  3/16/25 00:00


    3/20/25 14:56


50 MG


 


 Mupirocin  1 applic  BID


 TOP  3/18/25 22:00


    3/20/25 15:00


1 APPLIC


 


 Amiodarone HCl  200 mg  DAILY


 PO  3/19/25 10:00


    3/20/25 15:00


200 MG


 


 Albumin Human  100 ml @ 


 100 mls/hr  KRISTI


 IV  3/20/25 10:30


 3/22/25 11:29  3/20/25 13:24


100 MLS/HR








objective


Gen.: Patient lying in bed in no apparent distress. On room air


Head: Normocephalic, atraumatic.


Eyes: EOMI/PERRLA.


Ears: Normal hearing. Normal anatomy.


Neck/trachea: Trachea midline, supple.


Nose: Normal external anatomy.


Mouth: Moist mucous membranes.


Chest: Decreased air entry bilaterally. No wheezing or rhonchi.


Cardiovascular: Positive S1, positive S2. Regular rate and rhythm.


Abdomen: Positive bowel sounds in all 4 quadrants. Soft, non-tender, non-

distended.


: Deferred.


Rectal: Deferred.


Skin: Warm, dry. Intact.


Extremities: 2+ radial pulses bilaterally. No lower extremity edema.


Neuro: Awake, alert, oriented x3. No gross motor or sensory deficits. Cranial 

nerves II through XII intact. Gait not assessed.


laboratory and microbiology


                                Laboratory Tests


3/20/25 18:50








3/20/25 03:15

















Test


 3/20/25


03:15 Range/Units


 


 


Serum Glucose 101    mg/dL








Assessment/Plan


Impression:


Acute hypoxic respiratory failure


Shock


Atrial fibrillation, rate controlled


Lactic acidosis


End-stage renal disease, on peritoneal dialysis


Obesity





Events:


Remains on room air.


Supplemental oxygen PRN





Nephrology recommendations appreciated


Plan for hemodialysis


Left femoral Lenin cath placed.


First attempt in right IJ, unable to thread guidewire.





Head of bed elevation


Aspiration precautions





Monitor blood pressure.





Continue antibiotics


Continue steroids - Hydrocortisone 50 mg IV q.6 hours.


Incentive spirometry





Continue Protonix BID


Clinimix for nutritional support





Monitor hemoglobin


Transfuse if less than 7.0 g/dL.





Wound care.





Labs and imaging reviewed.


Rest of plan as noted below.





Plan:


Supplemental oxygen PRN


Titrate to keep O2 sats above 92%.





Incentive spirometry





Continue antibiotics - meropenem


Follow up cultures





Amiodarone PO


Cardiology recs appreciated.





Pressors as necessary for hemodynamic support


Titrate to keep mean arterial pressure greater than 65 mmHg.


Monitor blood pressure





Monitor renal function.


PD per Nephrology


Monitor electrolytes. Supplement as necessary.


Monitor ins and outs.


Nephrology recommendations appreciated





Diet and lifestyle modifications for weight reduction


Obesity - complicates all care





DVT prophylaxis.





Prognosis: Poor given patient's multiple co-morbidities.


Condition: Critical





Rest of plan per hospitalist and other consultants.





A total of 35 minutes of critical care time was spent reviewing the patient 

record, examining the patient, making a diagnostic and therapeutic plan, 

discussing this plan with the medical personnel, following up on diagnostic 

studies and following the patient for clinical stability excluding any and all 

procedures.  At least 50% of this time was spent in direct, face-to-face 

contact.





Thank you, Dr. Platt, for allowing me to participate in this patient's care.


Further recommendations will depend on the patient's clinical course.


Please do not hesitate to contact me if you have any questions or concerns.





This medical document was created using an electronic medical record system with

Dragon computerized dictation system. Although these documentations are being 

carefully reviewed, there may still be some phonetic and typographical changes. 

The errors are purely typographical, due to imperfection on the software 

program, and do not reflect any compromise in the patient's medical care.





Dietary Evaluation Review


Comments:  


Encourage high protein diet. consider Nepro 240ml 19, 432 kcal. PO  


supplements BID


Expected Outcomes/Goals:  


maintain protein levels WNL


Plan discussed with:  Other (BALDO Fajardo)


Critical Care Time(min):  35











JOSEPH SUAREZ MD             Mar 20, 2025 21:00

## 2025-03-20 NOTE — DVHPN2
Progress Note - Dictate


Date Seen:  Mar 20, 2025


Medical Necessity Reason


Pt with a Central, PICC or Fol:  Yes


Subjective


Patient currently on Levophed at 12 mics, hemodialysis catheter replaced and 

patient got hemodialysis today.





WBC elevated.


vital signs





                                   Vital Sign








  Date Time  Temp Pulse Resp B/P (MAP) Pulse Ox O2 Delivery O2 Flow Rate FiO2


 


3/20/25 09:37  108 22 118/35 100  0.0 21


 


3/20/25 04:00 97.2       





 97.2       


 


3/20/25 00:46      Room Air  














                           Total Intake and Output   


 


 3/19/25 3/19/25 3/20/25





 15:00 23:00 07:00


 


Intake Total 218.752 ml 442.688 ml 476.25 ml


 


Output Total  0 ml 


 


Balance 218.752 ml 442.688 ml 476.25 ml








medications





                               Current Medications








 Medications  Dose


 Ordered  Sig/Anthony


 Route  Start Time


 Stop Time Status Last Admin


Dose Admin


 


 Ondansetron HCl  4 mg  Q4HP  PRN


 IV  3/4/25 17:30


    3/19/25 10:18


4 MG


 


 Morphine Sulfate  2 mg  Q4HPRN  PRN


 IV  3/4/25 17:30


    3/17/25 03:02


2 MG


 


 Morphine Sulfate  2 mg  Q30M  PRN


 IV  3/4/25 17:30


     





 


 Acetaminophen/


 Hydrocodone Bitart  1 tab  Q4HP  PRN


 PO  3/4/25 17:30


    3/18/25 20:50


1 TAB


 


 Acetaminophen  650 mg  Q6HP  PRN


 PO  3/4/25 17:30


    3/19/25 10:18


650 MG


 


 Nitroglycerin  0.4 mg  Q5MINP  PRN


 SL  3/4/25 17:30


     





 


 Levothyroxine


 Sodium  100 mcg  DAILY


 PO  3/5/25 10:00


    3/19/25 10:16


100 MCG


 


 Atorvastatin


 Calcium  10 mg  HS


 PO  3/4/25 22:00


    3/19/25 21:56


10 MG


 


 Docusate Sodium  100 mg  BIDP  PRN


 PO  3/4/25 21:00


     





 


 Budesonide  0.5 mg  BID


 NEB  3/4/25 22:00


    3/20/25 07:27


0.5 MG


 


 Midodrine  10 mg  TID@0600,1200,1800


 PO  3/5/25 06:00


    3/20/25 05:43


10 MG


 


 Levalbuterol HCl  0.625 mg  Q6HR


 NEB  3/6/25 06:00


    3/20/25 07:27


0.625 MG


 


 Phenylephrine HCl


 80 mg/Sodium


 Chloride  250 ml @ 


 7.5 mls/hr  Q24H


 IV  3/6/25 15:00


    3/16/25 16:01


15 MLS/HR


 


 Vancomycin HCl  0 ml @ 0


 mls/hr  UD


 IV  3/8/25 09:00


     





 


 Meropenem  50 ml @ 17


 mls/hr  DAILY


 IV  3/10/25 10:00


    3/19/25 10:16


17 MLS/HR


 


 Norepinephrine


 Bitartrate 32 mg/


 Sodium Chloride  250 ml @ 


 0.938 mls/


 hr  Q24H


 IV  3/9/25 13:45


    3/14/25 09:27


0.938 MLS/HR


 


 Epoetin Kiran-epbx  10,000 unit  MWF@2100


 SC  3/12/25 21:00


    3/19/25 21:52


10,000 UNIT


 


 Diagnostic Test


  (Pha)  1 strip  Q6HR


 VI  3/10/25 18:00


    3/20/25 05:44


1 STRIP


 


 Insulin Human


 Regular  FOLLOW


 SLIDING


 SCALE  Q6HR


 SC  3/10/25 18:00


    3/19/25 05:29


2 UNITS


 


 Dextrose  50 ml  UD


 IV  3/10/25 15:30


     





 


 Pantoprazole


 Sodium  40 mg  BID


 IV  3/11/25 22:00


    3/19/25 21:52


40 MG


 


 Guaifenesin/


 Dextromethorphan  10 ml  Q4HP  PRN


 PO  3/12/25 17:30


    3/14/25 02:29


10 ML


 


 Hydrocortisone


 Sodium Succinate  50 mg  Q6HR


 IV  3/16/25 00:00


    3/20/25 05:44


50 MG


 


 Mupirocin  1 applic  BID


 TOP  3/18/25 22:00


    3/19/25 21:53


1 APPLIC


 


 Amiodarone HCl  200 mg  DAILY


 PO  3/19/25 10:00


    3/19/25 10:16


200 MG








objective


General Appearance:  Alert, , mild distress


HEENT:  Atraumatic, PERRLA, EOMI


Respiratory:  Clear to auscultation, Normal air movement


Cardiovascular:  Normal S1, Normal S2, Other (afib)


Abdominal:  non tender. 


Extremities:  No clubbing, No cyanosis, Normal pulses ,edema +


Skin:  No rashes, No breakdown


Neuro:  grossly intact


laboratory and microbiology


                                Laboratory Tests


3/20/25 03:15

















Test


 3/20/25


03:15 Range/Units


 


 


Serum Glucose 101    mg/dL








Assessment/Plan


Patient is a 82-year-old female presents to the hospital with:





Hypotension/Shock


Lactic Acidosis 


Afib uncontrolled


Chest pain


Hyperkalemia


ESRD on PD


Chronic abdominal pain


Diarrhea 





Recommendations:


Patient is still on 2 pressors.





Patient is on low-dose Levophed at three mics per hour during dialysis S/P 

femoral dialysis catheter placement on 03/20 for dialysis access





Continue Vancomycin/ Meropenem empirically


Pulm/ crit care on board


follow blood culture: no growth


no clear source of infection





hypotension is likely multifactorial cardiac due to Afib with rvr vs medication 

induced/ acidosis





s/p  Peritoneal fluid analysis: cell count not qualifying for peritonitis. 

cultures are also negative 








Antibiotic status:


Meropenem IV [Restarted on 03/10]


Vancomycin IV [Started on 03/05 - 03/08]





03/20, Chest x-ray showed No significant change in position of the right 

internal jugular catheter. Slightly improving infiltrate left base 


03/20, Vancomycin Random is 15.3





03/04, Abdomen Pelvis CT showed No acute intra-abdominal finding. Isodense 

lesion of the right mid kidney measuring 1.2 cm, attention on follow-up is 

recommended.  


Compression fracture of L2 and possibly superior endplate of L1 with mild 

posterior extension resulting in mild spinal canal stenosis, age-indeterminate  





03/04, Blood culture showed no growth





03/06, Fluid culture preliminary showed no growth





03/05, MRSA screening negative





critical care time 35 minutes which includes assessment, coordinating plan with 

nurse and consultants. 





prognosis poor


plan discussed with RN





Thank you for consult.





Dietary Evaluation Review


Comments:  


Encourage high protein diet. consider Nepro 240ml 19, 432 kcal. PO  


supplements BID


Expected Outcomes/Goals:  


maintain protein levels WNL











GELA ELLISON MD            Mar 20, 2025 09:56

## 2025-03-21 NOTE — DVHPN2
Progress Note


Date Seen:  Mar 21, 2025


Medical Necessity Reason


Pt with a Central, PICC or Fol:  Yes





Subjective


Patient reports:  No new complaints


Other Systems:  


Patient seen and examined by myself today in follow-up


Patient examined hemodialysis, blood pressure stable





Objective


vital signs





                                   Vital Sign








  Date Time  Temp Pulse Resp B/P (MAP) Pulse Ox O2 Delivery O2 Flow Rate FiO2


 


3/21/25 11:20 98.0 76 20 115/42    





 98.0       


 


3/21/25 08:00     99 Nasal Cannula* 2 28














                           Total Intake and Output   


 


 3/20/25 3/20/25 3/21/25





 15:00 23:00 07:00


 


Intake Total 395.930 ml 405.003 ml 498.752 ml


 


Output Total  3300 ml 


 


Balance 395.930 ml -2894.997 ml 498.752 ml








medications





                               Current Medications








 Medications  Dose


 Ordered  Sig/Anthony


 Route  Start Time


 Stop Time Status Last Admin


Dose Admin


 


 Ondansetron HCl  4 mg  Q4HP  PRN


 IV  3/4/25 17:30


    3/19/25 10:18


4 MG


 


 Morphine Sulfate  2 mg  Q4HPRN  PRN


 IV  3/4/25 17:30


    3/17/25 03:02


2 MG


 


 Morphine Sulfate  2 mg  Q30M  PRN


 IV  3/4/25 17:30


     





 


 Acetaminophen/


 Hydrocodone Bitart  1 tab  Q4HP  PRN


 PO  3/4/25 17:30


    3/20/25 15:11


1 TAB


 


 Acetaminophen  650 mg  Q6HP  PRN


 PO  3/4/25 17:30


    3/19/25 10:18


650 MG


 


 Nitroglycerin  0.4 mg  Q5MINP  PRN


 SL  3/4/25 17:30


     





 


 Atorvastatin


 Calcium  10 mg  HS


 PO  3/4/25 22:00


    3/20/25 21:39


10 MG


 


 Docusate Sodium  100 mg  BIDP  PRN


 PO  3/4/25 21:00


     





 


 Budesonide  0.5 mg  BID


 NEB  3/4/25 22:00


    3/21/25 06:46


0.5 MG


 


 Midodrine  10 mg  TID@0600,1200,1800


 PO  3/5/25 06:00


    3/21/25 05:13


10 MG


 


 Levalbuterol HCl  0.625 mg  Q6HR


 NEB  3/6/25 06:00


    3/21/25 06:46


0.625 MG


 


 Phenylephrine HCl


 80 mg/Sodium


 Chloride  250 ml @ 


 7.5 mls/hr  Q24H


 IV  3/6/25 15:00


    3/16/25 16:01


15 MLS/HR


 


 Vancomycin HCl  0 ml @ 0


 mls/hr  UD


 IV  3/8/25 09:00


     





 


 Meropenem  50 ml @ 17


 mls/hr  DAILY


 IV  3/10/25 10:00


    3/21/25 09:12


17 MLS/HR


 


 Norepinephrine


 Bitartrate 32 mg/


 Sodium Chloride  250 ml @ 


 0.938 mls/


 hr  Q24H


 IV  3/9/25 13:45


    3/20/25 13:45


5.625 MLS/HR


 


 Epoetin Kiran-epbx  10,000 unit  MWF@2100


 SC  3/12/25 21:00


    3/19/25 21:52


10,000 UNIT


 


 Diagnostic Test


  (Pha)  1 strip  Q6HR


 VI  3/10/25 18:00


    3/20/25 23:23


1 STRIP


 


 Insulin Human


 Regular  FOLLOW


 SLIDING


 SCALE  Q6HR


 SC  3/10/25 18:00


    3/19/25 05:29


2 UNITS


 


 Dextrose  50 ml  UD


 IV  3/10/25 15:30


     





 


 Pantoprazole


 Sodium  40 mg  BID


 IV  3/11/25 22:00


    3/21/25 09:12


40 MG


 


 Guaifenesin/


 Dextromethorphan  10 ml  Q4HP  PRN


 PO  3/12/25 17:30


    3/14/25 02:29


10 ML


 


 Hydrocortisone


 Sodium Succinate  50 mg  Q6HR


 IV  3/16/25 00:00


    3/21/25 05:13


50 MG


 


 Mupirocin  1 applic  BID


 TOP  3/18/25 22:00


    3/21/25 09:13


1 APPLIC


 


 Amiodarone HCl  200 mg  DAILY


 PO  3/19/25 10:00


    3/21/25 09:13


200 MG


 


 Albumin Human  100 ml @ 


 100 mls/hr  KRISTI


 IV  3/20/25 10:30


 3/22/25 11:29  3/20/25 13:24


100 MLS/HR


 


 Levothyroxine


 Sodium  100 mcg  QAM


 PO  3/21/25 08:16


    3/21/25 08:27


100 MCG








laboratory and microbiology


                                Laboratory Tests


3/21/25 04:00

















Test


 3/21/25


04:00 Range/Units


 


 


Serum Glucose 119 H   mg/dL








                                  Microbiology








 Date/Time


Source Procedure


Growth Status





 


 3/8/25 16:30


Blood Blood Culture - Final


NO GROWTH AFTER 5 DAYS OF INCUBATION. Complete





 3/6/25 05:30


Peritoneal Fluid Gram Stain - Final


 Complete


 


 3/6/25 05:30


Peritoneal Fluid Body Fluid Culture - Final


 Complete


 


 3/5/25 23:00


Nose MRSA Screen - Final


 Complete











Problem List/Assessment/Plan


Problem List/Assessment/Plan


ESRD on peritoneal dialysis, currently on hemodialysis


Stool impaction status post disimpaction


Hyponatremia due to excess H2O


septic Shock


AFib with RVR


Hypokalemia


Lactic acidosis


Dropping hemoglobin


GI bleeding





Recommendations


Continue with UF 2-3 L as tolerated


Epogen 22734 subQ 3 times a week


Packed red blood cell transfusion p.r.n.


KCL replacement


Strict I&Os


Laxative


PD fluid white blood cell count is normal


IV antibiotic


IV pressors for blood pressure support


Midodrine 10 mg p.o. t.i.d.


GI consult


Cardiology consult


We will continue to follow











I discussed my plan of care with the patient, her daughter and the primary nurse

at the bedside


Plan discussed with:  Patient





Dietary Evaluation Review


Comments:  


Encourage high protein diet. consider Nepro 240ml 19, 432 kcal. PO  


supplements BID


Expected Outcomes/Goals:  


maintain protein levels WNL











FAIZA MORALES MD        Mar 21, 2025 11:29

## 2025-03-21 NOTE — DVHPN2
Progress Note - Dictate


Date Seen:  Mar 21, 2025


Medical Necessity Reason


Pt with a Central, PICC or Fol:  Yes


Subjective


No GI bleeding


Hemoglobin did drop down to 6.8; patient received 1 unit PRBC today


Patient received hemodialysis and 3 L fluid were removed 


Patient is on low-dose Levophed at three mics per hour during dialysis


S/P femoral dialysis catheter placement yesterday for dialysis access


vital signs





                                   Vital Sign








  Date Time  Temp Pulse Resp B/P (MAP) Pulse Ox O2 Delivery O2 Flow Rate FiO2


 


3/21/25 16:15  72 11 107/46 (66) 98   


 


3/21/25 16:00 98.2       





 98.2       


 


3/21/25 16:00      Room Air* 0 21














                           Total Intake and Output   


 


 3/20/25 3/20/25 3/21/25





 15:00 23:00 07:00


 


Intake Total 395.930 ml 405.003 ml 499.690 ml


 


Output Total  3300 ml 


 


Balance 395.930 ml -2894.997 ml 499.690 ml








medications





                               Current Medications








 Medications  Dose


 Ordered  Sig/Anthony


 Route  Start Time


 Stop Time Status Last Admin


Dose Admin


 


 Ondansetron HCl  4 mg  Q4HP  PRN


 IV  3/4/25 17:30


    3/19/25 10:18


4 MG


 


 Morphine Sulfate  2 mg  Q4HPRN  PRN


 IV  3/4/25 17:30


    3/17/25 03:02


2 MG


 


 Morphine Sulfate  2 mg  Q30M  PRN


 IV  3/4/25 17:30


     





 


 Acetaminophen/


 Hydrocodone Bitart  1 tab  Q4HP  PRN


 PO  3/4/25 17:30


    3/20/25 15:11


1 TAB


 


 Acetaminophen  650 mg  Q6HP  PRN


 PO  3/4/25 17:30


    3/19/25 10:18


650 MG


 


 Nitroglycerin  0.4 mg  Q5MINP  PRN


 SL  3/4/25 17:30


     





 


 Atorvastatin


 Calcium  10 mg  HS


 PO  3/4/25 22:00


    3/20/25 21:39


10 MG


 


 Docusate Sodium  100 mg  BIDP  PRN


 PO  3/4/25 21:00


     





 


 Budesonide  0.5 mg  BID


 NEB  3/4/25 22:00


    3/21/25 06:46


0.5 MG


 


 Midodrine  10 mg  TID@0600,1200,1800


 PO  3/5/25 06:00


    3/21/25 13:36


10 MG


 


 Levalbuterol HCl  0.625 mg  Q6HR


 NEB  3/6/25 06:00


    3/21/25 13:50


0.625 MG


 


 Phenylephrine HCl


 80 mg/Sodium


 Chloride  250 ml @ 


 7.5 mls/hr  Q24H


 IV  3/6/25 15:00


    3/16/25 16:01


15 MLS/HR


 


 Vancomycin HCl  0 ml @ 0


 mls/hr  UD


 IV  3/8/25 09:00


     





 


 Meropenem  50 ml @ 17


 mls/hr  DAILY


 IV  3/10/25 10:00


    3/21/25 09:12


17 MLS/HR


 


 Norepinephrine


 Bitartrate 32 mg/


 Sodium Chloride  250 ml @ 


 0.938 mls/


 hr  Q24H


 IV  3/9/25 13:45


    3/20/25 13:45


5.625 MLS/HR


 


 Epoetin Kiran-epbx  10,000 unit  MWF@2100


 SC  3/12/25 21:00


    3/19/25 21:52


10,000 UNIT


 


 Diagnostic Test


  (Pha)  1 strip  Q6HR


 VI  3/10/25 18:00


    3/21/25 12:00


1 STRIP


 


 Insulin Human


 Regular  FOLLOW


 SLIDING


 SCALE  Q6HR


 SC  3/10/25 18:00


    3/19/25 05:29


2 UNITS


 


 Dextrose  50 ml  UD


 IV  3/10/25 15:30


     





 


 Pantoprazole


 Sodium  40 mg  BID


 IV  3/11/25 22:00


    3/21/25 09:12


40 MG


 


 Guaifenesin/


 Dextromethorphan  10 ml  Q4HP  PRN


 PO  3/12/25 17:30


    3/14/25 02:29


10 ML


 


 Hydrocortisone


 Sodium Succinate  50 mg  Q6HR


 IV  3/16/25 00:00


    3/21/25 13:35


50 MG


 


 Mupirocin  1 applic  BID


 TOP  3/18/25 22:00


    3/21/25 09:13


1 APPLIC


 


 Amiodarone HCl  200 mg  DAILY


 PO  3/19/25 10:00


    3/21/25 09:13


200 MG


 


 Albumin Human  100 ml @ 


 100 mls/hr  KRISTI


 IV  3/20/25 10:30


 3/22/25 11:29  3/20/25 13:24


100 MLS/HR


 


 Levothyroxine


 Sodium  100 mcg  QAM


 PO  3/21/25 08:16


    3/21/25 08:27


100 MCG








objective


GENERAL:  Not in acute distress.  


HEENT: EOMI,  Moist mucous membranes.  No scleral icterus.  No cervical 

lymphadenopathy.


LUNGS: Clear to auscultation bilaterally.  No accessory muscle use.


CARDIOVASCULAR: Regular rate and rhythm.  No murmur.  No JVD.


ABDOMEN: Mild-to-moderate periumbilical tenderness to palpation, +BS, ABD is 

distended


EXTREMITIES: No edema.  Nontender.


SKIN: No rashes or lesions.  Warm.


NEUROLOGIC:  Alert and oriented X3.


laboratory and microbiology


                                Laboratory Tests


3/21/25 04:00

















Test


 3/21/25


04:00 Range/Units


 


 


Serum Glucose 119 H   mg/dL








Problems(with codes):  


(1) ESRD (end stage renal disease) on dialysis


(2) Abdominal pain


(3) Hypothyroidism


(4) History of peritoneal dialysis


(5) Chronic kidney disease


Prognosis


Plan





Protonix 40 mg p.o. daily


Advance diet as tolerated 


Continue supportive care 


Monitor labs


I will follow up





Dietary Evaluation Review


Comments:  


Encourage high protein diet. consider Nepro 240ml 19, 432 kcal. PO  


supplements BID


Expected Outcomes/Goals:  


maintain protein levels WNL


Plan discussed with:  Other (ICU nurse)











ISABELLE CARLSON MD                Mar 21, 2025 17:54

## 2025-03-21 NOTE — DVHPN2
Progress Note - Dictate


Date Seen:  Mar 21, 2025


Medical Necessity Reason


Pt with a Central, PICC or Fol:  Yes


Subjective


Patient seen and examined at bedside.


Currently on supplemental oxygen


Overnight events reviewed.


vital signs





                                   Vital Sign








  Date Time  Temp Pulse Resp B/P (MAP) Pulse Ox O2 Delivery O2 Flow Rate FiO2


 


3/21/25 22:30  64 12 90/35 (53) 96   


 


3/21/25 22:00      Room Air* 0 21


 


3/21/25 20:00 97.4       





 97.4       














                           Total Intake and Output   


 


 3/20/25 3/20/25 3/21/25





 15:00 23:00 07:00


 


Intake Total 395.930 ml 405.003 ml 499.690 ml


 


Output Total  3300 ml 


 


Balance 395.930 ml -2894.997 ml 499.690 ml








medications





                               Current Medications








 Medications  Dose


 Ordered  Sig/Anthony


 Route  Start Time


 Stop Time Status Last Admin


Dose Admin


 


 Ondansetron HCl  4 mg  Q4HP  PRN


 IV  3/4/25 17:30


    3/19/25 10:18


4 MG


 


 Morphine Sulfate  2 mg  Q4HPRN  PRN


 IV  3/4/25 17:30


    3/17/25 03:02


2 MG


 


 Morphine Sulfate  2 mg  Q30M  PRN


 IV  3/4/25 17:30


     





 


 Acetaminophen/


 Hydrocodone Bitart  1 tab  Q4HP  PRN


 PO  3/4/25 17:30


    3/20/25 15:11


1 TAB


 


 Acetaminophen  650 mg  Q6HP  PRN


 PO  3/4/25 17:30


    3/19/25 10:18


650 MG


 


 Nitroglycerin  0.4 mg  Q5MINP  PRN


 SL  3/4/25 17:30


     





 


 Atorvastatin


 Calcium  10 mg  HS


 PO  3/4/25 22:00


    3/21/25 21:53


10 MG


 


 Docusate Sodium  100 mg  BIDP  PRN


 PO  3/4/25 21:00


     





 


 Budesonide  0.5 mg  BID


 NEB  3/4/25 22:00


    3/21/25 18:37


0.5 MG


 


 Midodrine  10 mg  TID@0600,1200,1800


 PO  3/5/25 06:00


    3/21/25 18:34


10 MG


 


 Levalbuterol HCl  0.625 mg  Q6HR


 NEB  3/6/25 06:00


    3/21/25 18:37


0.625 MG


 


 Phenylephrine HCl


 80 mg/Sodium


 Chloride  250 ml @ 


 7.5 mls/hr  Q24H


 IV  3/6/25 15:00


    3/16/25 16:01


15 MLS/HR


 


 Norepinephrine


 Bitartrate 32 mg/


 Sodium Chloride  250 ml @ 


 0.938 mls/


 hr  Q24H


 IV  3/9/25 13:45


    3/20/25 13:45


5.625 MLS/HR


 


 Epoetin Kiran-epbx  10,000 unit  MWF@2100


 SC  3/12/25 21:00


    3/21/25 21:52


10,000 UNIT


 


 Diagnostic Test


  (Pha)  1 strip  Q6HR


 VI  3/10/25 18:00


    3/21/25 18:18


1 STRIP


 


 Insulin Human


 Regular  FOLLOW


 SLIDING


 SCALE  Q6HR


 SC  3/10/25 18:00


    3/19/25 05:29


2 UNITS


 


 Dextrose  50 ml  UD


 IV  3/10/25 15:30


     





 


 Pantoprazole


 Sodium  40 mg  BID


 IV  3/11/25 22:00


    3/21/25 21:52


40 MG


 


 Guaifenesin/


 Dextromethorphan  10 ml  Q4HP  PRN


 PO  3/12/25 17:30


    3/14/25 02:29


10 ML


 


 Hydrocortisone


 Sodium Succinate  50 mg  Q6HR


 IV  3/16/25 00:00


    3/21/25 18:33


50 MG


 


 Mupirocin  1 applic  BID


 TOP  3/18/25 22:00


    3/21/25 21:54


1 APPLIC


 


 Amiodarone HCl  200 mg  DAILY


 PO  3/19/25 10:00


    3/21/25 09:13


200 MG


 


 Albumin Human  100 ml @ 


 100 mls/hr  KRISTI


 IV  3/20/25 10:30


 3/22/25 11:29  3/20/25 13:24


100 MLS/HR


 


 Levothyroxine


 Sodium  100 mcg  QAM


 PO  3/21/25 08:16


    3/21/25 08:27


100 MCG








objective


Gen.: Patient lying in bed in no apparent distress. On supplemental oxygen


Head: Normocephalic, atraumatic.


Eyes: EOMI/PERRLA.


Ears: Normal hearing. Normal anatomy.


Neck/trachea: Trachea midline, supple.


Nose: Normal external anatomy.


Mouth: Moist mucous membranes.


Chest: Decreased air entry bilaterally. No wheezing or rhonchi.


Cardiovascular: Positive S1, positive S2. Regular rate and rhythm.


Abdomen: Positive bowel sounds in all 4 quadrants. Soft, non-tender, non-

distended.


: Deferred.


Rectal: Deferred.


Skin: Warm, dry. Intact.


Extremities: 2+ radial pulses bilaterally. No lower extremity edema.


Neuro: Awake, alert, oriented x3. No gross motor or sensory deficits. Cranial 

nerves II through XII intact. Gait not assessed.


laboratory and microbiology


                                Laboratory Tests


3/21/25 04:00

















Test


 3/21/25


04:00 Range/Units


 


 


Serum Glucose 119 H   mg/dL








Assessment/Plan


Impression:


Acute hypoxic respiratory failure


Shock


Atrial fibrillation, rate controlled


Lactic acidosis


End-stage renal disease, on peritoneal dialysis


Obesity





Events:


Currently on supplemental oxygen 2 LPM NC


Taper O2 as tolerated





On pressors for hemodynamic support


Levophed 3 mcg/min


Titrate to keep mean arterial pressure greater than 65 mmHg





Nephrology recommendations appreciated


S/p left femoral Elnin cath yesterday.


Plan for HD today per Nephrology


Monitor renal function





Head of bed elevation


Aspiration precautions





Monitor blood pressure.





Continue antibiotics


Continue steroids - Hydrocortisone 50 mg IV q.6 hours.


Incentive spirometry





Continue Protonix BID


Clinimix for nutritional support





Monitor hemoglobin


Transfuse if less than 7.0 g/dL.





Wound care.





Labs and imaging reviewed.


Rest of plan as noted below.





Plan:


Supplemental oxygen


Titrate to keep O2 sats above 92%.





Incentive spirometry





Continue antibiotics - meropenem, vancomycin


Follow up cultures





Amiodarone PO


Cardiology recs appreciated.





Pressors as necessary for hemodynamic support


Titrate to keep mean arterial pressure greater than 65 mmHg.


Monitor blood pressure





Monitor renal function.


PD per Nephrology


Monitor electrolytes. Supplement as necessary.


Monitor ins and outs.


Nephrology recommendations appreciated





Diet and lifestyle modifications for weight reduction


Obesity - complicates all care





DVT prophylaxis.





Prognosis: Poor given patient's multiple co-morbidities.


Condition: Critical





Rest of plan per hospitalist and other consultants.





A total of 35 minutes of critical care time was spent reviewing the patient 

record, examining the patient, making a diagnostic and therapeutic plan, 

discussing this plan with the medical personnel, following up on diagnostic 

studies and following the patient for clinical stability excluding any and all 

procedures.  At least 50% of this time was spent in direct, face-to-face 

contact.





Thank you, Dr. Platt, for allowing me to participate in this patient's care.


Further recommendations will depend on the patient's clinical course.


Please do not hesitate to contact me if you have any questions or concerns.





This medical document was created using an electronic medical record system with

Dragon computerized dictation system. Although these documentations are being 

carefully reviewed, there may still be some phonetic and typographical changes. 

The errors are purely typographical, due to imperfection on the software 

program, and do not reflect any compromise in the patient's medical care.





Dietary Evaluation Review


Comments:  


Encourage high protein diet. consider Nepro 240ml 19, 432 kcal. PO  


supplements BID


Expected Outcomes/Goals:  


maintain protein levels WNL


Plan discussed with:  Other (BALDO De La Fuente)


Critical Care Time(min):  35











JOSEPH SUAREZ MD             Mar 21, 2025 23:24

## 2025-03-21 NOTE — DVHPN2
Progress Note - Dictate


Date Seen:  Mar 21, 2025


Medical Necessity Reason


Pt with a Central, PICC or Fol:  Yes


Subjective


Hemoglobin is low at 6.8; patient received 1 unit PRBC today.


Patient is on low-dose Levophed at three mics per hour during dialysis S/P 

femoral dialysis catheter placement yesterday for dialysis access





WBC normal


vital signs





                                   Vital Sign








  Date Time  Temp Pulse Resp B/P (MAP) Pulse Ox O2 Delivery O2 Flow Rate FiO2


 


3/21/25 16:15  72 11 107/46 (66) 98   


 


3/21/25 16:00 98.2       





 98.2       


 


3/21/25 16:00      Room Air* 0 21














                           Total Intake and Output   


 


 3/20/25 3/20/25 3/21/25





 15:00 23:00 07:00


 


Intake Total 395.930 ml 405.003 ml 499.690 ml


 


Output Total  3300 ml 


 


Balance 395.930 ml -2894.997 ml 499.690 ml








medications





                               Current Medications








 Medications  Dose


 Ordered  Sig/Anthony


 Route  Start Time


 Stop Time Status Last Admin


Dose Admin


 


 Ondansetron HCl  4 mg  Q4HP  PRN


 IV  3/4/25 17:30


    3/19/25 10:18


4 MG


 


 Morphine Sulfate  2 mg  Q4HPRN  PRN


 IV  3/4/25 17:30


    3/17/25 03:02


2 MG


 


 Morphine Sulfate  2 mg  Q30M  PRN


 IV  3/4/25 17:30


     





 


 Acetaminophen/


 Hydrocodone Bitart  1 tab  Q4HP  PRN


 PO  3/4/25 17:30


    3/20/25 15:11


1 TAB


 


 Acetaminophen  650 mg  Q6HP  PRN


 PO  3/4/25 17:30


    3/19/25 10:18


650 MG


 


 Nitroglycerin  0.4 mg  Q5MINP  PRN


 SL  3/4/25 17:30


     





 


 Atorvastatin


 Calcium  10 mg  HS


 PO  3/4/25 22:00


    3/20/25 21:39


10 MG


 


 Docusate Sodium  100 mg  BIDP  PRN


 PO  3/4/25 21:00


     





 


 Budesonide  0.5 mg  BID


 NEB  3/4/25 22:00


    3/21/25 06:46


0.5 MG


 


 Midodrine  10 mg  TID@0600,1200,1800


 PO  3/5/25 06:00


    3/21/25 18:34


10 MG


 


 Levalbuterol HCl  0.625 mg  Q6HR


 NEB  3/6/25 06:00


    3/21/25 13:50


0.625 MG


 


 Phenylephrine HCl


 80 mg/Sodium


 Chloride  250 ml @ 


 7.5 mls/hr  Q24H


 IV  3/6/25 15:00


    3/16/25 16:01


15 MLS/HR


 


 Vancomycin HCl  0 ml @ 0


 mls/hr  UD


 IV  3/8/25 09:00


     





 


 Meropenem  50 ml @ 17


 mls/hr  DAILY


 IV  3/10/25 10:00


    3/21/25 09:12


17 MLS/HR


 


 Norepinephrine


 Bitartrate 32 mg/


 Sodium Chloride  250 ml @ 


 0.938 mls/


 hr  Q24H


 IV  3/9/25 13:45


    3/20/25 13:45


5.625 MLS/HR


 


 Epoetin Kiran-epbx  10,000 unit  MWF@2100


 SC  3/12/25 21:00


    3/19/25 21:52


10,000 UNIT


 


 Diagnostic Test


  (Pha)  1 strip  Q6HR


 VI  3/10/25 18:00


    3/21/25 18:18


1 STRIP


 


 Insulin Human


 Regular  FOLLOW


 SLIDING


 SCALE  Q6HR


 SC  3/10/25 18:00


    3/19/25 05:29


2 UNITS


 


 Dextrose  50 ml  UD


 IV  3/10/25 15:30


     





 


 Pantoprazole


 Sodium  40 mg  BID


 IV  3/11/25 22:00


    3/21/25 09:12


40 MG


 


 Guaifenesin/


 Dextromethorphan  10 ml  Q4HP  PRN


 PO  3/12/25 17:30


    3/14/25 02:29


10 ML


 


 Hydrocortisone


 Sodium Succinate  50 mg  Q6HR


 IV  3/16/25 00:00


    3/21/25 18:33


50 MG


 


 Mupirocin  1 applic  BID


 TOP  3/18/25 22:00


    3/21/25 09:13


1 APPLIC


 


 Amiodarone HCl  200 mg  DAILY


 PO  3/19/25 10:00


    3/21/25 09:13


200 MG


 


 Albumin Human  100 ml @ 


 100 mls/hr  KRISTI


 IV  3/20/25 10:30


 3/22/25 11:29  3/20/25 13:24


100 MLS/HR


 


 Levothyroxine


 Sodium  100 mcg  QAM


 PO  3/21/25 08:16


    3/21/25 08:27


100 MCG








objective


General Appearance:  Alert, , mild distress


HEENT:  Atraumatic, PERRLA, EOMI


Respiratory:  Clear to auscultation, Normal air movement


Cardiovascular:  Normal S1, Normal S2, Other (afib)


Abdominal:  non tender. 


Extremities:  No clubbing, No cyanosis, Normal pulses ,edema +


Skin:  No rashes, No breakdown


Neuro:  grossly intact


laboratory and microbiology


                                Laboratory Tests


3/21/25 04:00

















Test


 3/21/25


04:00 Range/Units


 


 


Serum Glucose 119 H   mg/dL








Assessment/Plan


Patient is a 82-year-old female presents to the hospital with:





Hypotension/Shock


Lactic Acidosis 


Afib uncontrolled


Chest pain


Hyperkalemia


ESRD on PD


Chronic abdominal pain


Diarrhea 





Recommendations:


Patient is still on 2 pressors





No signs of peritonitis





Patient is on low-dose Levophed at three mics per hour during dialysis S/P 

femoral dialysis catheter placement on 03/20 for dialysis access





Stop Antibiotics


Pulm/ crit care on board


follow blood culture: no growth


no clear source of infection





hypotension is likely multifactorial cardiac due to Afib with rvr vs medication 

induced/ acidosis





s/p  Peritoneal fluid analysis: cell count not qualifying for peritonitis. 

cultures are also negative 





Antibiotic status:


Meropenem IV [Restarted on 03/10]


Vancomycin IV [Restarted on 03/21]





03/21, Vancomycin Random is 12.0. 





03/21, Chest/Abdomen x-ray showed Abnormal nonspecific bowel-gas pattern.





03/20, Chest x-ray showed No significant change in position of the right 

internal jugular catheter. Slightly improving infiltrate left base 


03/20, Vancomycin Random is 15.3





03/04, Abdomen Pelvis CT showed No acute intra-abdominal finding. Isodense 

lesion of the right mid kidney measuring 1.2 cm, attention on follow-up is 

recommended.  


Compression fracture of L2 and possibly superior endplate of L1 with mild 

posterior extension resulting in mild spinal canal stenosis, age-indeterminate  





03/04, Blood culture showed no growth





03/06, Fluid culture preliminary showed no growth





03/05, MRSA screening negative





critical care time 35 minutes which includes assessment, coordinating plan with 

nurse and consultants. 





prognosis poor


plan discussed with RN





Thank you for consult.





Dietary Evaluation Review


Comments:  


Encourage high protein diet. consider Nepro 240ml 19, 432 kcal. PO  


supplements BID


Expected Outcomes/Goals:  


maintain protein levels WNL











GELA ELLISON MD            Mar 21, 2025 18:38

## 2025-03-21 NOTE — DVHPN2
Progress Note - Dictate


Date Seen:  Mar 21, 2025


Medical Necessity Reason


Pt with a Central, PICC or Fol:  Yes


Subjective


Patient is seen and evaluated in the ICU discussed with the nurse at bedside.  

She is undergoing hemodialysis now.  Patient has a temporary groin dialysis 

catheter.  Her hemoglobin dropped to six point again today for which he was 

getting blood.  Patient apparently getting about 2000 units of heparin with 

dialysis.


vital signs





                                   Vital Sign








  Date Time  Temp Pulse Resp B/P (MAP) Pulse Ox O2 Delivery O2 Flow Rate FiO2


 


3/21/25 13:11 98.0 72 18 105/50    





 98.0       


 


3/21/25 12:00     99 Room Air* 0 21














                           Total Intake and Output   


 


 3/20/25 3/20/25 3/21/25





 15:00 23:00 07:00


 


Intake Total 395.930 ml 405.003 ml 498.752 ml


 


Output Total  3300 ml 


 


Balance 395.930 ml -2894.997 ml 498.752 ml








medications





                               Current Medications








 Medications  Dose


 Ordered  Sig/Anthony


 Route  Start Time


 Stop Time Status Last Admin


Dose Admin


 


 Ondansetron HCl  4 mg  Q4HP  PRN


 IV  3/4/25 17:30


    3/19/25 10:18


4 MG


 


 Morphine Sulfate  2 mg  Q4HPRN  PRN


 IV  3/4/25 17:30


    3/17/25 03:02


2 MG


 


 Morphine Sulfate  2 mg  Q30M  PRN


 IV  3/4/25 17:30


     





 


 Acetaminophen/


 Hydrocodone Bitart  1 tab  Q4HP  PRN


 PO  3/4/25 17:30


    3/20/25 15:11


1 TAB


 


 Acetaminophen  650 mg  Q6HP  PRN


 PO  3/4/25 17:30


    3/19/25 10:18


650 MG


 


 Nitroglycerin  0.4 mg  Q5MINP  PRN


 SL  3/4/25 17:30


     





 


 Atorvastatin


 Calcium  10 mg  HS


 PO  3/4/25 22:00


    3/20/25 21:39


10 MG


 


 Docusate Sodium  100 mg  BIDP  PRN


 PO  3/4/25 21:00


     





 


 Budesonide  0.5 mg  BID


 NEB  3/4/25 22:00


    3/21/25 06:46


0.5 MG


 


 Midodrine  10 mg  TID@0600,1200,1800


 PO  3/5/25 06:00


    3/21/25 13:36


10 MG


 


 Levalbuterol HCl  0.625 mg  Q6HR


 NEB  3/6/25 06:00


    3/21/25 06:46


0.625 MG


 


 Phenylephrine HCl


 80 mg/Sodium


 Chloride  250 ml @ 


 7.5 mls/hr  Q24H


 IV  3/6/25 15:00


    3/16/25 16:01


15 MLS/HR


 


 Vancomycin HCl  0 ml @ 0


 mls/hr  UD


 IV  3/8/25 09:00


     





 


 Meropenem  50 ml @ 17


 mls/hr  DAILY


 IV  3/10/25 10:00


    3/21/25 09:12


17 MLS/HR


 


 Norepinephrine


 Bitartrate 32 mg/


 Sodium Chloride  250 ml @ 


 0.938 mls/


 hr  Q24H


 IV  3/9/25 13:45


    3/20/25 13:45


5.625 MLS/HR


 


 Epoetin Kiran-epbx  10,000 unit  MWF@2100


 SC  3/12/25 21:00


    3/19/25 21:52


10,000 UNIT


 


 Diagnostic Test


  (Pha)  1 strip  Q6HR


 VI  3/10/25 18:00


    3/21/25 12:00


1 STRIP


 


 Insulin Human


 Regular  FOLLOW


 SLIDING


 SCALE  Q6HR


 SC  3/10/25 18:00


    3/19/25 05:29


2 UNITS


 


 Dextrose  50 ml  UD


 IV  3/10/25 15:30


     





 


 Pantoprazole


 Sodium  40 mg  BID


 IV  3/11/25 22:00


    3/21/25 09:12


40 MG


 


 Guaifenesin/


 Dextromethorphan  10 ml  Q4HP  PRN


 PO  3/12/25 17:30


    3/14/25 02:29


10 ML


 


 Hydrocortisone


 Sodium Succinate  50 mg  Q6HR


 IV  3/16/25 00:00


    3/21/25 13:35


50 MG


 


 Mupirocin  1 applic  BID


 TOP  3/18/25 22:00


    3/21/25 09:13


1 APPLIC


 


 Amiodarone HCl  200 mg  DAILY


 PO  3/19/25 10:00


    3/21/25 09:13


200 MG


 


 Albumin Human  100 ml @ 


 100 mls/hr  KRISTI


 IV  3/20/25 10:30


 3/22/25 11:29  3/20/25 13:24


100 MLS/HR


 


 Levothyroxine


 Sodium  100 mcg  QAM


 PO  3/21/25 08:16


    3/21/25 08:27


100 MCG








objective


Elderly female in bed without significant distress.  HEENT notable for movement 

phase.  Heart regular rate and rhythm S1-S2.  Lungs fair air movement with poor 

inspiratory effort.  Abdomen soft positive bowel sounds.  Extremities positive 

edema.


laboratory and microbiology


                                Laboratory Tests


3/21/25 04:00

















Test


 3/21/25


04:00 Range/Units


 


 


Serum Glucose 119 H   mg/dL








Assessment/Plan


Continue 1 unit of transfusion today.  Continue PPI.  We will notify Nephrology 

to see if they can minimize put minimum amount of heparin with dialysis given 

her intermittent anemia with the bleeding.  Otherwise continue rest of 

supportive care and treatment as she is on.  Patient needs a tunneled dialysis 

catheter therefore I will have Interventional Radiology consultation for 

placement.  Otherwise no changes to rest of her management.  Overall prognosis 

remains poor.  Discussed with the nurse at bedside regarding care plan


Problems(with codes):  


(1) ESRD (end stage renal disease) on dialysis


(2) History of peritoneal dialysis


(3) Hypotension





Dietary Evaluation Review


Comments:  


Encourage high protein diet. consider Nepro 240ml 19, 432 kcal. PO  


supplements BID


Expected Outcomes/Goals:  


maintain protein levels WNL


Plan discussed with:  Other











BEATRIS CARY MD      Mar 21, 2025 13:52

## 2025-03-21 NOTE — DVH
Exam: XY KUB ABDOMEN SINGLE VIEW



Indication: abdominal distension



Comparison: None



Technique:    2 radiographic views of the abdomen.



Findings: 



Left central venous catheter appears in satisfactory position. Peritoneal dialysis catheter appears i
n satisfactory position.



Abnormal nonspecific bowel-gas pattern. Distended stomach.



There is no definite evidence for pneumoperitoneum. 



No abnormal calcifications noted.



Impression: 



Abnormal nonspecific bowel-gas pattern.



Electronically Signed by: Orion Hodge at 03/21/2025 14:10:26 PM

## 2025-03-21 NOTE — DVHNC2
Central Line


Recorder of insertion practice:  


Occupation of :  Other (David Alejandre NP)


Room prepared for procedure:  Yes


Maximal sterile barrier precau:  Mask/Eye shield, Sterile gown, Cap, Sterlie 

gloves, Large sterlie drape


Skin Preparation:  Chlorhexidine gluconate, Providine iodine


Skin preparation completely dr:  Yes


Insertion site:  Left, Femoral


Central line catheter type:  Dialysis non-tunneled


Number of lumens:  2


Central line exchanged over a:  Yes


Antiseptic ointment applied to:  Yes


Post Assessment:  Proper placement


Informed consent obtained:  Yes


Risks/benefits/alt described:  Yes


Notes


Patient has sluggish flow through left femoral dialysis catheter.  Requested by 

nephrologist to exchange HD catheter.  Unable to place catheter to left total 

jugular vein new patient moving, kinking wire while as being advanced.  Catheter

was exchanged over wire to left femoral vein.


Extubated to blood loss:  5 mL





Date of Service:  Mar 21, 2025


Billing Provider:  JAKY ALEJANDRE NP


Common Visit Codes:  PROCEDURE ONLY


Procedure Codes:  36556-INSERT NON-TUNNEL CV CATH











JAKY ALEJANDRE NP            Mar 21, 2025 17:51

## 2025-03-22 NOTE — DVHPN2
Progress Note - Dictate


Date Seen:  Mar 22, 2025


Medical Necessity Reason


Pt with a Central, PICC or Fol:  Yes


Subjective


Patient is seen and evaluated in the ICU discussed with the nurse at bedside.  

Apparently patient's daughter wants her to go back and try peritoneal dialysis. 

Patient did however undergo hemodialysis today per nursing the 2 L of fluid 

removed.  She is briefly put back on Levophed for blood pressure support hose 

dialysis.


vital signs





                                   Vital Sign








  Date Time  Temp Pulse Resp B/P (MAP) Pulse Ox O2 Delivery O2 Flow Rate FiO2


 


3/22/25 16:00 98.5 69 15 108/40 (62) 97   





 98.5       


 


3/22/25 16:00      Room Air* 0 21














                           Total Intake and Output   


 


 3/21/25 3/21/25 3/22/25





 15:00 23:00 07:00


 


Intake Total 788.504 ml 601.876 ml 364.564 ml


 


Balance 788.504 ml 601.876 ml 364.564 ml








medications





                               Current Medications








 Medications  Dose


 Ordered  Sig/Anthony


 Route  Start Time


 Stop Time Status Last Admin


Dose Admin


 


 Ondansetron HCl  4 mg  Q4HP  PRN


 IV  3/4/25 17:30


    3/19/25 10:18


4 MG


 


 Morphine Sulfate  2 mg  Q4HPRN  PRN


 IV  3/4/25 17:30


    3/17/25 03:02


2 MG


 


 Morphine Sulfate  2 mg  Q30M  PRN


 IV  3/4/25 17:30


     





 


 Acetaminophen/


 Hydrocodone Bitart  1 tab  Q4HP  PRN


 PO  3/4/25 17:30


    3/20/25 15:11


1 TAB


 


 Acetaminophen  650 mg  Q6HP  PRN


 PO  3/4/25 17:30


    3/19/25 10:18


650 MG


 


 Nitroglycerin  0.4 mg  Q5MINP  PRN


 SL  3/4/25 17:30


     





 


 Atorvastatin


 Calcium  10 mg  HS


 PO  3/4/25 22:00


    3/21/25 21:53


10 MG


 


 Docusate Sodium  100 mg  BIDP  PRN


 PO  3/4/25 21:00


     





 


 Budesonide  0.5 mg  BID


 NEB  3/4/25 22:00


    3/22/25 12:06


0.5 MG


 


 Midodrine  10 mg  TID@0600,1200,1800


 PO  3/5/25 06:00


    3/22/25 17:16


10 MG


 


 Levalbuterol HCl  0.625 mg  Q6HR


 NEB  3/6/25 06:00


    3/22/25 12:06


0.625 MG


 


 Phenylephrine HCl


 80 mg/Sodium


 Chloride  250 ml @ 


 7.5 mls/hr  Q24H


 IV  3/6/25 15:00


    3/16/25 16:01


15 MLS/HR


 


 Norepinephrine


 Bitartrate 32 mg/


 Sodium Chloride  250 ml @ 


 0.938 mls/


 hr  Q24H


 IV  3/9/25 13:45


    3/22/25 01:21


0.938 MLS/HR


 


 Diagnostic Test


  (Pha)  1 strip  Q6HR


 VI  3/10/25 18:00


    3/22/25 12:22


1 STRIP


 


 Insulin Human


 Regular  FOLLOW


 SLIDING


 SCALE  Q6HR


 SC  3/10/25 18:00


    3/19/25 05:29


2 UNITS


 


 Dextrose  50 ml  UD


 IV  3/10/25 15:30


     





 


 Pantoprazole


 Sodium  40 mg  BID


 IV  3/11/25 22:00


    3/22/25 12:34


40 MG


 


 Guaifenesin/


 Dextromethorphan  10 ml  Q4HP  PRN


 PO  3/12/25 17:30


    3/14/25 02:29


10 ML


 


 Hydrocortisone


 Sodium Succinate  50 mg  Q6HR


 IV  3/16/25 00:00


    3/22/25 17:15


50 MG


 


 Mupirocin  1 applic  BID


 TOP  3/18/25 22:00


    3/22/25 10:00


1 APPLIC


 


 Amiodarone HCl  200 mg  DAILY


 PO  3/19/25 10:00


    3/22/25 12:35


200 MG


 


 Levothyroxine


 Sodium  100 mcg  QAM


 PO  3/21/25 08:16


    3/22/25 06:08


100 MCG


 


 Dimethicone  80 mg  BID


 PO  3/22/25 22:00


     











objective


Elderly female in bed without significant distress.  HEENT notable for movement 

phase.  Heart regular rate and rhythm S1-S2.  Lungs fair air movement with poor 

inspiratory effort.  Abdomen soft positive bowel sounds.  Extremities positive 

edema.


laboratory and microbiology


                                Laboratory Tests


3/22/25 03:30

















Test


 3/22/25


03:30 Range/Units


 


 


Serum Glucose 122 H   mg/dL








Assessment/Plan


Hemoglobin is stable.  Continue present management as she is on.  Follow 

clinically management for clinical course and admissions from the nephrologist. 

Discussed with the nurse in the ICU regarding care plan.  No changes to present 

management.


Problems(with codes):  


(1) ESRD (end stage renal disease) on dialysis


(2) Abdominal pain


(3) Chronic kidney disease





Dietary Evaluation Review


Comments:  


Encourage high protein diet. consider Nepro 240ml 19, 432 kcal. PO  


supplements BID


Expected Outcomes/Goals:  


maintain protein levels WNL


Plan discussed with:  Other











BEATRIS CARY MD      Mar 22, 2025 17:23

## 2025-03-22 NOTE — DVHPN2
Progress Note


Date Seen:  Mar 22, 2025


Medical Necessity Reason


Pt with a Central, PICC or Fol:  Yes





Subjective


Review of Systems:  GI:Abnormal


Other Systems:  


Patient seen and examined by myself today in follow-up


Patient examined hemodialysis, blood pressure stable





Objective


vital signs





                                   Vital Sign








  Date Time  Temp Pulse Resp B/P (MAP) Pulse Ox O2 Delivery O2 Flow Rate FiO2


 


3/22/25 06:46  59 12 103/45 (64) 97   


 


3/22/25 06:08      Room Air* 0 21


 


3/22/25 04:00 98.1       





 98.1       














                           Total Intake and Output   


 


 3/21/25 3/21/25 3/22/25





 15:00 23:00 07:00


 


Intake Total 788.504 ml 601.876 ml 364.564 ml


 


Balance 788.504 ml 601.876 ml 364.564 ml








medications





                               Current Medications








 Medications  Dose


 Ordered  Sig/Anthony


 Route  Start Time


 Stop Time Status Last Admin


Dose Admin


 


 Ondansetron HCl  4 mg  Q4HP  PRN


 IV  3/4/25 17:30


    3/19/25 10:18


4 MG


 


 Morphine Sulfate  2 mg  Q4HPRN  PRN


 IV  3/4/25 17:30


    3/17/25 03:02


2 MG


 


 Morphine Sulfate  2 mg  Q30M  PRN


 IV  3/4/25 17:30


     





 


 Acetaminophen/


 Hydrocodone Bitart  1 tab  Q4HP  PRN


 PO  3/4/25 17:30


    3/20/25 15:11


1 TAB


 


 Acetaminophen  650 mg  Q6HP  PRN


 PO  3/4/25 17:30


    3/19/25 10:18


650 MG


 


 Nitroglycerin  0.4 mg  Q5MINP  PRN


 SL  3/4/25 17:30


     





 


 Atorvastatin


 Calcium  10 mg  HS


 PO  3/4/25 22:00


    3/21/25 21:53


10 MG


 


 Docusate Sodium  100 mg  BIDP  PRN


 PO  3/4/25 21:00


     





 


 Budesonide  0.5 mg  BID


 NEB  3/4/25 22:00


    3/21/25 18:37


0.5 MG


 


 Midodrine  10 mg  TID@0600,1200,1800


 PO  3/5/25 06:00


    3/22/25 05:34


10 MG


 


 Levalbuterol HCl  0.625 mg  Q6HR


 NEB  3/6/25 06:00


    3/22/25 00:16


0.625 MG


 


 Phenylephrine HCl


 80 mg/Sodium


 Chloride  250 ml @ 


 7.5 mls/hr  Q24H


 IV  3/6/25 15:00


    3/16/25 16:01


15 MLS/HR


 


 Norepinephrine


 Bitartrate 32 mg/


 Sodium Chloride  250 ml @ 


 0.938 mls/


 hr  Q24H


 IV  3/9/25 13:45


    3/22/25 01:21


0.938 MLS/HR


 


 Epoetin Kiran-epbx  10,000 unit  MWF@2100


 SC  3/12/25 21:00


    3/21/25 21:52


10,000 UNIT


 


 Diagnostic Test


  (Pha)  1 strip  Q6HR


 VI  3/10/25 18:00


    3/22/25 00:00


1 STRIP


 


 Insulin Human


 Regular  FOLLOW


 SLIDING


 SCALE  Q6HR


 SC  3/10/25 18:00


    3/19/25 05:29


2 UNITS


 


 Dextrose  50 ml  UD


 IV  3/10/25 15:30


     





 


 Pantoprazole


 Sodium  40 mg  BID


 IV  3/11/25 22:00


    3/21/25 21:52


40 MG


 


 Guaifenesin/


 Dextromethorphan  10 ml  Q4HP  PRN


 PO  3/12/25 17:30


    3/14/25 02:29


10 ML


 


 Hydrocortisone


 Sodium Succinate  50 mg  Q6HR


 IV  3/16/25 00:00


    3/22/25 05:34


50 MG


 


 Mupirocin  1 applic  BID


 TOP  3/18/25 22:00


    3/21/25 21:54


1 APPLIC


 


 Amiodarone HCl  200 mg  DAILY


 PO  3/19/25 10:00


    3/21/25 09:13


200 MG


 


 Albumin Human  100 ml @ 


 100 mls/hr  KRISTI


 IV  3/20/25 10:30


 3/22/25 11:29  3/20/25 13:24


100 MLS/HR


 


 Levothyroxine


 Sodium  100 mcg  QAM


 PO  3/21/25 08:16


    3/22/25 06:08


100 MCG








Examination:  LUNGS:Normal, ABDOMEN:Abnormal, MSK:Normal


laboratory and microbiology


                                Laboratory Tests


3/22/25 03:30

















Test


 3/22/25


03:30 Range/Units


 


 


Serum Glucose 122 H   mg/dL








                                  Microbiology








 Date/Time


Source Procedure


Growth Status





 


 3/8/25 16:30


Blood Blood Culture - Final


NO GROWTH AFTER 5 DAYS OF INCUBATION. Complete





 3/6/25 05:30


Peritoneal Fluid Gram Stain - Final


 Complete


 


 3/6/25 05:30


Peritoneal Fluid Body Fluid Culture - Final


 Complete


 


 3/5/25 23:00


Nose MRSA Screen - Final


 Complete











Problem List/Assessment/Plan


Problem List/Assessment/Plan


ESRD on peritoneal dialysis, currently on hemodialysis


Stool impaction status post disimpaction


Hyponatremia due to excess H2O


septic Shock


AFib with RVR


Hypokalemia


Lactic acidosis


Dropping hemoglobin


GI bleeding





Recommendations


Continue with UF 2-3 L as tolerated, use no heparin


Epogen 76230 subQ 3 times a week


Albumin 25% p.r.n. hemodialysis


Packed red blood cell transfusion p.r.n.


KCL replacement


Strict I&Os


Laxative


PD fluid white blood cell count is normal


IV antibiotic


IV pressors for blood pressure support


Midodrine 10 mg p.o. t.i.d.


GI consult


Cardiology consult


We will continue to follow











I discussed my plan of care with the patient, her daughter and the primary nurse

at the bedside


Plan discussed with:  Patient, Daughter





My Orders


My Orders





                        Orders - FAIZA MORALES MD








Procedure Category Date Status





  Time 


 


Kub Abdomen Single XY 3/21/25 Resulted





View  11:47 


 


Hemodialysis Orders ORDERS 3/22/25 Transmitted





  09:27 


 


Dialysis Nursing DARLYN 3/22/25 Transmitted





Message  09:27 


 


Heparin Sodium PHA 3/22/25 Transmitted





 (Porcine)  09:30 


 


Sodium Chloride 0.9% PHA 3/22/25 Transmitted





  09:30 


 


Document Fluid Input DARLYN 3/22/25 Transmitted





And Outpu  09:27 


 


Retacrit 10,000unit PHA 3/22/25 Transmitted





Sc Xone  21:00 


 


Potassium Chl Humberto PHA 3/22/25 Transmitted





KCL  09:30 











Dietary Evaluation Review


Comments:  


Encourage high protein diet. consider Nepro 240ml 19, 432 kcal. PO  


supplements BID


Expected Outcomes/Goals:  


maintain protein levels WNL











FAIZA MORALES MD        Mar 22, 2025 09:31

## 2025-03-22 NOTE — DVHPN2
Progress Note - Dictate


Date Seen:  Mar 22, 2025


Medical Necessity Reason


Pt with a Central, PICC or Fol:  Yes


Subjective


Patient seen and examined at bedside.


Breathing on room air


Overnight events reviewed.


vital signs





                                   Vital Sign








  Date Time  Temp Pulse Resp B/P (MAP) Pulse Ox O2 Delivery O2 Flow Rate FiO2


 


3/22/25 21:31  78 17 124/42 (69) 98   


 


3/22/25 20:00      Room Air* 0 21


 


3/22/25 20:00 97.6       





 97.6       














                           Total Intake and Output   


 


 3/21/25 3/21/25 3/22/25





 15:00 23:00 07:00


 


Intake Total 788.504 ml 601.876 ml 364.564 ml


 


Balance 788.504 ml 601.876 ml 364.564 ml








medications





                               Current Medications








 Medications  Dose


 Ordered  Sig/Anthony


 Route  Start Time


 Stop Time Status Last Admin


Dose Admin


 


 Ondansetron HCl  4 mg  Q4HP  PRN


 IV  3/4/25 17:30


    3/19/25 10:18


4 MG


 


 Acetaminophen  650 mg  Q6HP  PRN


 PO  3/4/25 17:30


    3/19/25 10:18


650 MG


 


 Nitroglycerin  0.4 mg  Q5MINP  PRN


 SL  3/4/25 17:30


     





 


 Atorvastatin


 Calcium  10 mg  HS


 PO  3/4/25 22:00


    3/22/25 22:23


10 MG


 


 Docusate Sodium  100 mg  BIDP  PRN


 PO  3/4/25 21:00


     





 


 Budesonide  0.5 mg  BID


 NEB  3/4/25 22:00


    3/22/25 18:08


0.5 MG


 


 Midodrine  10 mg  TID@0600,1200,1800


 PO  3/5/25 06:00


    3/22/25 17:16


10 MG


 


 Levalbuterol HCl  0.625 mg  Q6HR


 NEB  3/6/25 06:00


    3/22/25 18:08


0.625 MG


 


 Phenylephrine HCl


 80 mg/Sodium


 Chloride  250 ml @ 


 7.5 mls/hr  Q24H


 IV  3/6/25 15:00


    3/16/25 16:01


15 MLS/HR


 


 Norepinephrine


 Bitartrate 32 mg/


 Sodium Chloride  250 ml @ 


 0.938 mls/


 hr  Q24H


 IV  3/9/25 13:45


    3/22/25 01:21


0.938 MLS/HR


 


 Diagnostic Test


  (Pha)  1 strip  Q6HR


 VI  3/10/25 18:00


    3/22/25 12:22


1 STRIP


 


 Insulin Human


 Regular  FOLLOW


 SLIDING


 SCALE  Q6HR


 SC  3/10/25 18:00


    3/19/25 05:29


2 UNITS


 


 Dextrose  50 ml  UD


 IV  3/10/25 15:30


     





 


 Pantoprazole


 Sodium  40 mg  BID


 IV  3/11/25 22:00


    3/22/25 22:23


40 MG


 


 Guaifenesin/


 Dextromethorphan  10 ml  Q4HP  PRN


 PO  3/12/25 17:30


    3/14/25 02:29


10 ML


 


 Hydrocortisone


 Sodium Succinate  50 mg  Q6HR


 IV  3/16/25 00:00


    3/22/25 17:15


50 MG


 


 Mupirocin  1 applic  BID


 TOP  3/18/25 22:00


    3/22/25 22:23


1 APPLIC


 


 Amiodarone HCl  200 mg  DAILY


 PO  3/19/25 10:00


    3/22/25 12:35


200 MG


 


 Levothyroxine


 Sodium  100 mcg  QAM


 PO  3/21/25 08:16


    3/22/25 06:08


100 MCG


 


 Dimethicone  80 mg  BID


 PO  3/22/25 22:00


    3/22/25 22:23


80 MG








objective


Gen.: Patient lying in bed in no apparent distress. On room air


Head: Normocephalic, atraumatic.


Eyes: EOMI/PERRLA.


Ears: Normal hearing. Normal anatomy.


Neck/trachea: Trachea midline, supple.


Nose: Normal external anatomy.


Mouth: Moist mucous membranes.


Chest: Decreased air entry bilaterally. No wheezing or rhonchi.


Cardiovascular: Positive S1, positive S2. Regular rate and rhythm.


Abdomen: Positive bowel sounds in all 4 quadrants. Soft, non-tender, non-

distended.


: Deferred.


Rectal: Deferred.


Skin: Warm, dry. Intact.


Extremities: 2+ radial pulses bilaterally. No lower extremity edema.


Neuro: Awake, alert, oriented x3. No gross motor or sensory deficits. Cranial 

nerves II through XII intact. Gait not assessed.


laboratory and microbiology


                                Laboratory Tests


3/22/25 03:30

















Test


 3/22/25


03:30 Range/Units


 


 


Serum Glucose 122 H   mg/dL








Assessment/Plan


Impression:


Acute hypoxic respiratory failure


Shock


Atrial fibrillation, rate controlled


Lactic acidosis


End-stage renal disease, on peritoneal dialysis


Obesity





Events:


Currently on room air


Supplemental oxygen PRN





On pressors for hemodynamic support


Levophed 2 mcg/min


Titrate to keep mean arterial pressure greater than 65 mmHg





S/p left femoral Lenin cath


HD per Nephrology - HD today removed 2 liters


Monitor renal function


Nephrology plans to resume peritoneal dialysis.





Head of bed elevation


Aspiration precautions





Monitor blood pressure.





Continue antibiotics


Continue steroids - Hydrocortisone 50 mg IV q.6 hours.


Incentive spirometry





Continue Protonix BID


Clinimix for nutritional support





Monitor hemoglobin


Transfuse if less than 7.0 g/dL.





Wound care.





Labs and imaging reviewed.


Rest of plan as noted below.





Plan:


Supplemental oxygen PRN


Titrate to keep O2 sats above 92%.





Incentive spirometry





Continue antibiotics - meropenem, vancomycin


Follow up cultures





Amiodarone PO


Cardiology recs appreciated.





Pressors as necessary for hemodynamic support


Titrate to keep mean arterial pressure greater than 65 mmHg.


Monitor blood pressure





Monitor renal function.


PD per Nephrology


Monitor electrolytes. Supplement as necessary.


Monitor ins and outs.


Nephrology recommendations appreciated





Diet and lifestyle modifications for weight reduction


Obesity - complicates all care





DVT prophylaxis.





Prognosis: Poor given patient's multiple co-morbidities.


Condition: Critical





Rest of plan per hospitalist and other consultants.





A total of 35 minutes of critical care time was spent reviewing the patient 

record, examining the patient, making a diagnostic and therapeutic plan, 

discussing this plan with the medical personnel, following up on diagnostic 

studies and following the patient for clinical stability excluding any and all 

procedures.  At least 50% of this time was spent in direct, face-to-face 

contact.





Thank you, Dr. Platt, for allowing me to participate in this patient's care.


Further recommendations will depend on the patient's clinical course.


Please do not hesitate to contact me if you have any questions or concerns.





This medical document was created using an electronic medical record system with

Dragon computerized dictation system. Although these documentations are being 

carefully reviewed, there may still be some phonetic and typographical changes. 

The errors are purely typographical, due to imperfection on the software 

program, and do not reflect any compromise in the patient's medical care.





Dietary Evaluation Review


Comments:  


Encourage high protein diet. consider Nepro 240ml 19, 432 kcal. PO  


supplements BID


Expected Outcomes/Goals:  


maintain protein levels WNL


Plan discussed with:  Other (BALDO De La Fuente)


Critical Care Time(min):  35











JOSEPH SUAREZ MD             Mar 22, 2025 22:30

## 2025-03-23 NOTE — DVHPN2
Progress Note - Dictate


Date Seen:  Mar 23, 2025


Medical Necessity Reason


Pt with a Central, PICC or Fol:  Yes


Subjective


Patient is seen and evaluated in the ICU discussed with the nurse at bedside.  

Patient is put back on Levophed for low blood pressure.


vital signs





                                   Vital Sign








  Date Time  Temp Pulse Resp B/P (MAP) Pulse Ox O2 Delivery O2 Flow Rate FiO2


 


3/23/25 12:07  62 16  100   


 


3/23/25 12:00      Room Air* 0 21


 


3/23/25 12:00    60/25 (37)    


 


3/23/25 08:00 98.4       





 98.4       














                           Total Intake and Output   


 


 3/22/25 3/22/25 3/23/25





 15:00 23:00 07:00


 


Intake Total 228.752 ml 351.442 ml 1258.504 ml


 


Balance 228.752 ml 351.442 ml 1258.504 ml








medications





                               Current Medications








 Medications  Dose


 Ordered  Sig/Anthony


 Route  Start Time


 Stop Time Status Last Admin


Dose Admin


 


 Ondansetron HCl  4 mg  Q4HP  PRN


 IV  3/4/25 17:30


    3/19/25 10:18


4 MG


 


 Acetaminophen  650 mg  Q6HP  PRN


 PO  3/4/25 17:30


    3/19/25 10:18


650 MG


 


 Nitroglycerin  0.4 mg  Q5MINP  PRN


 SL  3/4/25 17:30


     





 


 Atorvastatin


 Calcium  10 mg  HS


 PO  3/4/25 22:00


    3/22/25 22:23


10 MG


 


 Docusate Sodium  100 mg  BIDP  PRN


 PO  3/4/25 21:00


     





 


 Budesonide  0.5 mg  BID


 NEB  3/4/25 22:00


    3/23/25 06:16


0.5 MG


 


 Levalbuterol HCl  0.625 mg  Q6HR


 NEB  3/6/25 06:00


    3/23/25 11:59


0.625 MG


 


 Phenylephrine HCl


 80 mg/Sodium


 Chloride  250 ml @ 


 7.5 mls/hr  Q24H


 IV  3/6/25 15:00


    3/16/25 16:01


15 MLS/HR


 


 Norepinephrine


 Bitartrate 32 mg/


 Sodium Chloride  250 ml @ 


 0.938 mls/


 hr  Q24H


 IV  3/9/25 13:45


    3/22/25 01:21


0.938 MLS/HR


 


 Diagnostic Test


  (Pha)  1 strip  Q6HR


 VI  3/10/25 18:00


    3/23/25 12:25


1 STRIP


 


 Insulin Human


 Regular  FOLLOW


 SLIDING


 SCALE  Q6HR


 SC  3/10/25 18:00


    3/23/25 05:35


4 UNITS


 


 Dextrose  50 ml  UD


 IV  3/10/25 15:30


     





 


 Pantoprazole


 Sodium  40 mg  BID


 IV  3/11/25 22:00


    3/23/25 10:29


40 MG


 


 Guaifenesin/


 Dextromethorphan  10 ml  Q4HP  PRN


 PO  3/12/25 17:30


    3/23/25 06:54


10 ML


 


 Hydrocortisone


 Sodium Succinate  50 mg  Q6HR


 IV  3/16/25 00:00


    3/23/25 12:23


50 MG


 


 Mupirocin  1 applic  BID


 TOP  3/18/25 22:00


    3/23/25 10:37


1 APPLIC


 


 Amiodarone HCl  200 mg  DAILY


 PO  3/19/25 10:00


    3/23/25 10:29


200 MG


 


 Levothyroxine


 Sodium  100 mcg  QAM


 PO  3/21/25 08:16


    3/23/25 06:40


100 MCG


 


 Dimethicone  80 mg  BID


 PO  3/22/25 22:00


    3/23/25 10:29


80 MG


 


 Midodrine  15 mg  Q8HR


 PO  3/23/25 14:00


   UNV  





 


 Polyethylene


 Glycol  17 gm  BID


 PO  3/23/25 22:00


     





 


 Epoetin Kiran-epbx  10,000 unit  MWF


 SC  3/24/25 10:00


     











objective


Elderly female in bed without significant distress.  HEENT notable for movement 

phase.  Heart regular rate and rhythm S1-S2.  Lungs fair air movement with poor 

inspiratory effort.  Abdomen soft positive bowel sounds.  Extremities positive 

edema.


laboratory and microbiology


                                Laboratory Tests


3/23/25 10:35








3/23/25 03:08

















Test


 3/23/25


03:08 Range/Units


 


 


Serum Glucose 192 H   mg/dL








Assessment/Plan


No changes to present management from hospitalist point of view.  Further 

clinical management per recommendations from the nephrologist.  Discussed with 

the nurse at bedside





Dietary Evaluation Review


Comments:  


Encourage high protein diet. consider Nepro 240ml 19, 432 kcal. PO  


supplements BID


Expected Outcomes/Goals:  


maintain protein levels WNL


Plan discussed with:  Other











BEATRIS CARY MD      Mar 23, 2025 13:32

## 2025-03-23 NOTE — DVHPN2
Progress Note


Date Seen:  Mar 23, 2025


Medical Necessity Reason


Pt with a Central, PICC or Fol:  Yes





Subjective


Patient reports:  No new complaints


Other Systems:  


Patient seen and examined by myself today in follow-up 


Patient examined on protein dialysis fluid remained clear





Objective


vital signs





                                   Vital Sign








  Date Time  Temp Pulse Resp B/P (MAP) Pulse Ox O2 Delivery O2 Flow Rate FiO2


 


3/23/25 11:30  64 13 112/25 (54) 99   


 


3/23/25 10:00      Room Air* 0 21


 


3/23/25 08:00 98.4       





 98.4       














                           Total Intake and Output   


 


 3/22/25 3/22/25 3/23/25





 15:00 23:00 07:00


 


Intake Total 228.752 ml 351.442 ml 1258.504 ml


 


Balance 228.752 ml 351.442 ml 1258.504 ml








medications





                               Current Medications








 Medications  Dose


 Ordered  Sig/Anthony


 Route  Start Time


 Stop Time Status Last Admin


Dose Admin


 


 Ondansetron HCl  4 mg  Q4HP  PRN


 IV  3/4/25 17:30


    3/19/25 10:18


4 MG


 


 Acetaminophen  650 mg  Q6HP  PRN


 PO  3/4/25 17:30


    3/19/25 10:18


650 MG


 


 Nitroglycerin  0.4 mg  Q5MINP  PRN


 SL  3/4/25 17:30


     





 


 Atorvastatin


 Calcium  10 mg  HS


 PO  3/4/25 22:00


    3/22/25 22:23


10 MG


 


 Docusate Sodium  100 mg  BIDP  PRN


 PO  3/4/25 21:00


     





 


 Budesonide  0.5 mg  BID


 NEB  3/4/25 22:00


    3/23/25 06:16


0.5 MG


 


 Midodrine  10 mg  TID@0600,1200,1800


 PO  3/5/25 06:00


    3/23/25 05:31


10 MG


 


 Levalbuterol HCl  0.625 mg  Q6HR


 NEB  3/6/25 06:00


    3/23/25 00:17


0.625 MG


 


 Phenylephrine HCl


 80 mg/Sodium


 Chloride  250 ml @ 


 7.5 mls/hr  Q24H


 IV  3/6/25 15:00


    3/16/25 16:01


15 MLS/HR


 


 Norepinephrine


 Bitartrate 32 mg/


 Sodium Chloride  250 ml @ 


 0.938 mls/


 hr  Q24H


 IV  3/9/25 13:45


    3/22/25 01:21


0.938 MLS/HR


 


 Diagnostic Test


  (Pha)  1 strip  Q6HR


 VI  3/10/25 18:00


    3/23/25 05:31


1 STRIP


 


 Insulin Human


 Regular  FOLLOW


 SLIDING


 SCALE  Q6HR


 SC  3/10/25 18:00


    3/23/25 05:35


4 UNITS


 


 Dextrose  50 ml  UD


 IV  3/10/25 15:30


     





 


 Pantoprazole


 Sodium  40 mg  BID


 IV  3/11/25 22:00


    3/23/25 10:29


40 MG


 


 Guaifenesin/


 Dextromethorphan  10 ml  Q4HP  PRN


 PO  3/12/25 17:30


    3/23/25 06:54


10 ML


 


 Hydrocortisone


 Sodium Succinate  50 mg  Q6HR


 IV  3/16/25 00:00


    3/23/25 05:31


50 MG


 


 Mupirocin  1 applic  BID


 TOP  3/18/25 22:00


    3/23/25 10:37


1 APPLIC


 


 Amiodarone HCl  200 mg  DAILY


 PO  3/19/25 10:00


    3/23/25 10:29


200 MG


 


 Levothyroxine


 Sodium  100 mcg  QAM


 PO  3/21/25 08:16


    3/23/25 06:40


100 MCG


 


 Dimethicone  80 mg  BID


 PO  3/22/25 22:00


    3/23/25 10:29


80 MG








Examination:  LUNGS:Normal, CVS:Normal, ABDOMEN:Abnormal, MSK:Normal


laboratory and microbiology


                                Laboratory Tests


3/23/25 10:35








3/23/25 03:08

















Test


 3/23/25


03:08 Range/Units


 


 


Serum Glucose 192 H   mg/dL








                                  Microbiology








 Date/Time


Source Procedure


Growth Status





 


 3/8/25 16:30


Blood Blood Culture - Final


NO GROWTH AFTER 5 DAYS OF INCUBATION. Complete





 3/6/25 05:30


Peritoneal Fluid Gram Stain - Final


 Complete


 


 3/6/25 05:30


Peritoneal Fluid Body Fluid Culture - Final


 Complete


 


 3/5/25 23:00


Nose MRSA Screen - Final


 Complete











Problem List/Assessment/Plan


Problem List/Assessment/Plan


ESRD on peritoneal dialysis, currently on hemodialysis


Stool impaction status post disimpaction


Hyponatremia due to excess H2O


septic Shock


AFib with RVR


Hypokalemia


Lactic acidosis


Dropping hemoglobin


GI bleeding





Recommendations


The daughter Nava, used peritoneal dialysis cycler last night night for 8 hours


Patient currently filled with echo dextran 7.5%


Discussed with daughter to use the  PD cycler today for 10 hours and to report 

ultrafiltration to the primary nurse


Packed red blood cell transfusion p.r.n.


KCL replacement p.r.n. K 3.5 or less


Strict I&Os


Laxative


PD fluid white blood cell count is normal


IV antibiotic


IV pressors for blood pressure support


Midodrine 15 mg p.o. t.i.d.


GI consult


Cardiology consult


We will continue to follow











I discussed my plan of care with the patient, her daughter and the primary nurse

at the bedside


Plan discussed with:  Patient, Daughter





My Orders


My Orders





                        Orders - FAIZA MORALES MD








Procedure Category Date Status





  Time 


 


Chest Portable XY 3/23/25 Resulted





  04:00 


 


Communication Order ORDERS 3/23/25 Transmitted





  05:00 


 


Body Fluids, Diff. LAB 3/23/25 Verified





Cell Count  11:43 


 


Body Fluid Culture W/ SLOAN 3/23/25 Verified





GS  11:43 











Dietary Evaluation Review


Comments:  


Encourage high protein diet. consider Nepro 240ml 19, 432 kcal. PO  


supplements BID


Expected Outcomes/Goals:  


maintain protein levels WNL











FAIZA MORALES MD        Mar 23, 2025 11:53

## 2025-03-23 NOTE — DVHPN2
Progress Note - Dictate


Date Seen:  Mar 23, 2025


Medical Necessity Reason


Pt with a Central, PICC or Fol:  Yes


Subjective


Patient seen and examined at bedside.


Breathing on room air


Overnight events reviewed.


vital signs





                                   Vital Sign








  Date Time  Temp Pulse Resp B/P (MAP) Pulse Ox O2 Delivery O2 Flow Rate FiO2


 


3/23/25 23:15  73 12 112/46 (68) 100   


 


3/23/25 22:00      Room Air* 0 21


 


3/23/25 20:00 97.9       





 97.9       














                           Total Intake and Output   


 


 3/22/25 3/22/25 3/23/25





 15:00 23:00 07:00


 


Intake Total 228.752 ml 351.442 ml 1258.504 ml


 


Balance 228.752 ml 351.442 ml 1258.504 ml








medications





                               Current Medications








 Medications  Dose


 Ordered  Sig/Anthony


 Route  Start Time


 Stop Time Status Last Admin


Dose Admin


 


 Ondansetron HCl  4 mg  Q4HP  PRN


 IV  3/4/25 17:30


    3/19/25 10:18


4 MG


 


 Acetaminophen  650 mg  Q6HP  PRN


 PO  3/4/25 17:30


    3/19/25 10:18


650 MG


 


 Nitroglycerin  0.4 mg  Q5MINP  PRN


 SL  3/4/25 17:30


     





 


 Atorvastatin


 Calcium  10 mg  HS


 PO  3/4/25 22:00


    3/23/25 21:45


10 MG


 


 Docusate Sodium  100 mg  BIDP  PRN


 PO  3/4/25 21:00


     





 


 Budesonide  0.5 mg  BID


 NEB  3/4/25 22:00


    3/23/25 18:19


0.5 MG


 


 Levalbuterol HCl  0.625 mg  Q6HR


 NEB  3/6/25 06:00


    3/23/25 18:19


0.625 MG


 


 Phenylephrine HCl


 80 mg/Sodium


 Chloride  250 ml @ 


 7.5 mls/hr  Q24H


 IV  3/6/25 15:00


    3/16/25 16:01


15 MLS/HR


 


 Norepinephrine


 Bitartrate 32 mg/


 Sodium Chloride  250 ml @ 


 0.938 mls/


 hr  Q24H


 IV  3/9/25 13:45


    3/22/25 01:21


0.938 MLS/HR


 


 Diagnostic Test


  (Pha)  1 strip  Q6HR


 VI  3/10/25 18:00


    3/23/25 23:31


1 STRIP


 


 Insulin Human


 Regular  FOLLOW


 SLIDING


 SCALE  Q6HR


 SC  3/10/25 18:00


    3/23/25 23:34


4 UNITS


 


 Dextrose  50 ml  UD


 IV  3/10/25 15:30


     





 


 Pantoprazole


 Sodium  40 mg  BID


 IV  3/11/25 22:00


    3/23/25 21:45


40 MG


 


 Guaifenesin/


 Dextromethorphan  10 ml  Q4HP  PRN


 PO  3/12/25 17:30


    3/23/25 22:26


10 ML


 


 Hydrocortisone


 Sodium Succinate  50 mg  Q6HR


 IV  3/16/25 00:00


    3/23/25 23:30


50 MG


 


 Mupirocin  1 applic  BID


 TOP  3/18/25 22:00


    3/23/25 21:49


1 APPLIC


 


 Amiodarone HCl  200 mg  DAILY


 PO  3/19/25 10:00


    3/23/25 10:29


200 MG


 


 Levothyroxine


 Sodium  100 mcg  QAM


 PO  3/21/25 08:16


    3/23/25 06:40


100 MCG


 


 Dimethicone  80 mg  BID


 PO  3/22/25 22:00


    3/23/25 21:48


80 MG


 


 Midodrine  15 mg  DAILY@0600,1200,1800


 PO  3/23/25 18:00


    3/23/25 17:50


15 MG


 


 Polyethylene


 Glycol  17 gm  BID


 PO  3/23/25 22:00


    3/23/25 21:54


17 GM


 


 Epoetin Kiran-epbx  10,000 unit  MWF


 SC  3/24/25 10:00


     











objective


Gen.: Patient lying in bed in no apparent distress. On room air


Head: Normocephalic, atraumatic.


Eyes: EOMI/PERRLA.


Ears: Normal hearing. Normal anatomy.


Neck/trachea: Trachea midline, supple.


Nose: Normal external anatomy.


Mouth: Moist mucous membranes.


Chest: Decreased air entry bilaterally. No wheezing or rhonchi.


Cardiovascular: Positive S1, positive S2. Regular rate and rhythm.


Abdomen: Positive bowel sounds in all 4 quadrants. Soft, non-tender, non-

distended.


: Deferred.


Rectal: Deferred.


Skin: Warm, dry. Intact.


Extremities: 2+ radial pulses bilaterally. No lower extremity edema.


Neuro: Awake, alert, oriented x3. No gross motor or sensory deficits. Cranial 

nerves II through XII intact. Gait not assessed.


laboratory and microbiology


                                Laboratory Tests


3/23/25 10:35








3/23/25 03:08

















Test


 3/23/25


03:08 Range/Units


 


 


Serum Glucose 192 H   mg/dL








Assessment/Plan


Impression:


Acute hypoxic respiratory failure


Shock


Atrial fibrillation, rate controlled


Lactic acidosis


End-stage renal disease, on peritoneal dialysis


Obesity





Events:


Remains on room air


Supplemental oxygen PRN





Peritoneal dialysis today


Nephrology recommendations appreciated.





Antiemetic for nausea.





On pressors for hemodynamic support


Levophed 2 mcg/min


Titrate to keep mean arterial pressure greater than 65 mmHg





Head of bed elevation


Aspiration precautions





Monitor blood pressure.





Continue antibiotics


Continue steroids - Hydrocortisone 50 mg IV q.6 hours.


Incentive spirometry





Continue Protonix BID


Clinimix for nutritional support





Monitor hemoglobin


Transfuse if less than 7.0 g/dL.





Wound care.





PD per Nephrology


Monitor renal function





Labs and imaging reviewed.


Rest of plan as noted below.





Plan:


Supplemental oxygen PRN


Titrate to keep O2 sats above 92%.





S/p left femoral Lenin cath





Incentive spirometry





Continue antibiotics


Follow up cultures





Amiodarone PO


Cardiology recs appreciated.





Pressors as necessary for hemodynamic support


Titrate to keep mean arterial pressure greater than 65 mmHg.


Monitor blood pressure





Monitor renal function.


PD per Nephrology


Monitor electrolytes. Supplement as necessary.


Monitor ins and outs.


Nephrology recommendations appreciated





Diet and lifestyle modifications for weight reduction


Obesity - complicates all care





DVT prophylaxis.





Prognosis: Poor given patient's multiple co-morbidities.


Condition: Critical





Rest of plan per hospitalist and other consultants.





A total of 35 minutes of critical care time was spent reviewing the patient 

record, examining the patient, making a diagnostic and therapeutic plan, 

discussing this plan with the medical personnel, following up on diagnostic 

studies and following the patient for clinical stability excluding any and all 

procedures.  At least 50% of this time was spent in direct, face-to-face 

contact.





Thank you, Dr. Platt, for allowing me to participate in this patient's care.


Further recommendations will depend on the patient's clinical course.


Please do not hesitate to contact me if you have any questions or concerns.





This medical document was created using an electronic medical record system with

Dragon computerized dictation system. Although these documentations are being 

carefully reviewed, there may still be some phonetic and typographical changes. 

The errors are purely typographical, due to imperfection on the software 

program, and do not reflect any compromise in the patient's medical care.





Dietary Evaluation Review


Comments:  


Encourage high protein diet. consider Nepro 240ml 19, 432 kcal. PO  


supplements BID


Expected Outcomes/Goals:  


maintain protein levels WNL


Plan discussed with:  Other (BALDO Davis)


Critical Care Time(min):  35











JOSEPH SUAREZ MD             Mar 23, 2025 23:49

## 2025-03-23 NOTE — DVH
CHEST RADIOGRAPH



Indication: PROTOCOL



Technique: Single frontal view of the chest was obtained



COMPARISON: XY CHEST XRAY 1 VIEW on DOS: 3/20/25, XY CHEST PORTABLE on DOS: 3/14/25, XY CHEST PORTABL
E on DOS: 3/10/25, XY CHEST XRAY 1 VIEW on DOS: 3/6/25, XY CHEST PORTABLE on DOS: 3/4/25



FINDINGS: 



Lines and Tubes: Right internal jugular central venous catheter unchanged in position.



Lungs: Persistent left basilar opacification.



Pleura: No effusion.



No pneumothorax. 



Cardiomediastinal contours: Unremarkable



Bones: Unremarkable



IMPRESSION: 



1. Persistent left basilar opacification. 



 



Electronically Signed by: Kali Petit at 03/23/2025 05:57:48 AM

## 2025-03-24 NOTE — DVH
CHEST RADIOGRAPH



Indication: PROTOCOL



Technique: Single frontal view of the chest was obtained



Comparison: XY CHEST PORTABLE on DOS: 3/23/25



FINDINGS: 



Lines and Tubes: Right central venous catheter terminates in the right atrium.



Lungs: Bibasilar opacities.



Pleura: No effusion.



No pneumothorax. 



Cardiomediastinal contours: Unremarkable



Bones: No acute osseous abnormality.



IMPRESSION:



1. Bibasilar opacities which may reflect atelectasis or pneumonia.



Electronically Signed by: Katie Stover at 03/24/2025 05:00:22 AM

## 2025-03-24 NOTE — DVHPN2
Progress Note - Dictate


Date Seen:  Mar 24, 2025


Medical Necessity Reason


Pt with a Central, PICC or Fol:  Yes


The following are medically ne:  Central Line, Nieves Catheter


Reason for nieves catheter:  Strict I&O


Subjective


Patient remains clinically stable.  Nephrologist and her nurse is at bedside.  

Patient is undergoing peritoneal dialysis with the daughter at bedside as well.


vital signs





                                   Vital Sign








  Date Time  Temp Pulse Resp B/P (MAP) Pulse Ox O2 Delivery O2 Flow Rate FiO2


 


3/24/25 14:00   15  100 Room Air* 0 21


 


3/24/25 14:00  70  101/44 (63)    


 


3/24/25 12:00 97.8       





 97.8       














                           Total Intake and Output   


 


 3/23/25 3/23/25 3/24/25





 15:00 23:00 07:00


 


Intake Total 6.566 ml 727.504 ml 981.504 ml


 


Output Total  1490 ml 


 


Balance 6.566 ml -762.496 ml 981.504 ml








medications





                               Current Medications








 Medications  Dose


 Ordered  Sig/Anthony


 Route  Start Time


 Stop Time Status Last Admin


Dose Admin


 


 Ondansetron HCl  4 mg  Q4HP  PRN


 IV  3/4/25 17:30


    3/19/25 10:18


4 MG


 


 Acetaminophen  650 mg  Q6HP  PRN


 PO  3/4/25 17:30


    3/19/25 10:18


650 MG


 


 Nitroglycerin  0.4 mg  Q5MINP  PRN


 SL  3/4/25 17:30


     





 


 Atorvastatin


 Calcium  10 mg  HS


 PO  3/4/25 22:00


    3/23/25 21:45


10 MG


 


 Docusate Sodium  100 mg  BIDP  PRN


 PO  3/4/25 21:00


     





 


 Budesonide  0.5 mg  BID


 NEB  3/4/25 22:00


    3/24/25 07:03


0.5 MG


 


 Levalbuterol HCl  0.625 mg  Q6HR


 NEB  3/6/25 06:00


    3/24/25 12:34


0.625 MG


 


 Phenylephrine HCl


 80 mg/Sodium


 Chloride  250 ml @ 


 7.5 mls/hr  Q24H


 IV  3/6/25 15:00


    3/16/25 16:01


15 MLS/HR


 


 Diagnostic Test


  (Pha)  1 strip  Q6HR


 VI  3/10/25 18:00


    3/24/25 11:50


1 STRIP


 


 Insulin Human


 Regular  FOLLOW


 SLIDING


 SCALE  Q6HR


 SC  3/10/25 18:00


    3/23/25 23:34


4 UNITS


 


 Dextrose  50 ml  UD


 IV  3/10/25 15:30


     





 


 Pantoprazole


 Sodium  40 mg  BID


 IV  3/11/25 22:00


    3/24/25 11:26


40 MG


 


 Guaifenesin/


 Dextromethorphan  10 ml  Q4HP  PRN


 PO  3/12/25 17:30


    3/23/25 22:26


10 ML


 


 Hydrocortisone


 Sodium Succinate  50 mg  Q6HR


 IV  3/16/25 00:00


    3/24/25 12:36


50 MG


 


 Mupirocin  1 applic  BID


 TOP  3/18/25 22:00


    3/24/25 11:28


1 APPLIC


 


 Amiodarone HCl  200 mg  DAILY


 PO  3/19/25 10:00


    3/24/25 11:26


200 MG


 


 Levothyroxine


 Sodium  100 mcg  QAM


 PO  3/21/25 08:16


    3/24/25 07:39


100 MCG


 


 Dimethicone  80 mg  BID


 PO  3/22/25 22:00


    3/24/25 11:27


80 MG


 


 Midodrine  15 mg  DAILY@0600,1200,1800


 PO  3/23/25 18:00


    3/24/25 12:36


15 MG


 


 Polyethylene


 Glycol  17 gm  BID


 PO  3/23/25 22:00


    3/24/25 11:26


17 GM


 


 Epoetin Kiran-epbx  10,000 unit  MWF


 SC  3/24/25 10:00


    3/24/25 12:36


10,000 UNIT


 


 Lactulose  15 ml  TID


 PO  3/24/25 14:00


     





 


 Norepinephrine


 Bitartrate 32 mg/


 Sodium Chloride  250 ml @ 


 0.234 mls/


 hr  Q24H


 IV  3/24/25 13:15


    3/24/25 13:28


0.234 MLS/HR


 


 Norepinephrine


 Bitartrate 32 mg/


 Sodium Chloride  250 ml @ 0


 mls/hr  Q0M


 IV  3/24/25 13:15


   UNV  











objective


Elderly female in bed without significant distress.  HEENT notable for movement 

phase.  Heart regular rate and rhythm S1-S2.  Lungs fair air movement with poor 

inspiratory effort.  Abdomen soft positive bowel sounds.  Extremities positive 

edema.


laboratory and microbiology


                                Laboratory Tests


3/24/25 03:15

















Test


 3/24/25


03:15 Range/Units


 


 


Serum Glucose 135 H   mg/dL








Assessment/Plan


Per nephrologist her peritoneal dialysis settings have been readjusted.  Patient

is on minimal amount of Levophed.  Her systolic blood pressure is above 100.  

Nephrologist wants to continue the Levophed for another 24 hours.  Meantime 

patient is having small bowel movements with the constipation therefore her 

lactulose and laxatives have been increased.  Otherwise continue rest of 

supportive care and treatment as she is on.  Further clinical management per 

clinical course and recommendations from the nephrologist.  Discussed with the 

nurse and patient's family at bedside along with the nephrologist at bedside 

regarding care plan.





Dietary Evaluation Review


Comments:  


Encourage high protein diet. consider Nepro 240ml 19, 432 kcal. PO  


supplements BID


Expected Outcomes/Goals:  


maintain protein levels WNL


Plan discussed with:  Other











BEATRIS CARY MD      Mar 24, 2025 15:21

## 2025-03-24 NOTE — DVHPN2
Progress Note


Date Seen:  Mar 24, 2025


Medical Necessity Reason


Pt with a Central, PICC or Fol:  Yes


The following are medically ne:  Central Line, Nieves Catheter


Reason for nieves catheter:  Strict I&O





Subjective


Patient reports:  Other


Review of Systems:  RESPIRATORY:Abnormal





Objective


vital signs





                                   Vital Sign








  Date Time  Temp Pulse Resp B/P (MAP) Pulse Ox O2 Delivery O2 Flow Rate FiO2


 


3/24/25 12:00   12  100 Room Air* 0 21


 


3/24/25 11:45  71  101/47 (65)    


 


3/24/25 08:00 97.8       





 97.8       














                           Total Intake and Output   


 


 3/23/25 3/23/25 3/24/25





 15:00 23:00 07:00


 


Intake Total 6.566 ml 727.504 ml 981.504 ml


 


Output Total  1490 ml 


 


Balance 6.566 ml -762.496 ml 981.504 ml








medications





                               Current Medications








 Medications  Dose


 Ordered  Sig/Anthony


 Route  Start Time


 Stop Time Status Last Admin


Dose Admin


 


 Ondansetron HCl  4 mg  Q4HP  PRN


 IV  3/4/25 17:30


    3/19/25 10:18


4 MG


 


 Acetaminophen  650 mg  Q6HP  PRN


 PO  3/4/25 17:30


    3/19/25 10:18


650 MG


 


 Nitroglycerin  0.4 mg  Q5MINP  PRN


 SL  3/4/25 17:30


     





 


 Atorvastatin


 Calcium  10 mg  HS


 PO  3/4/25 22:00


    3/23/25 21:45


10 MG


 


 Docusate Sodium  100 mg  BIDP  PRN


 PO  3/4/25 21:00


     





 


 Budesonide  0.5 mg  BID


 NEB  3/4/25 22:00


    3/24/25 07:03


0.5 MG


 


 Levalbuterol HCl  0.625 mg  Q6HR


 NEB  3/6/25 06:00


    3/24/25 07:03


0.625 MG


 


 Phenylephrine HCl


 80 mg/Sodium


 Chloride  250 ml @ 


 7.5 mls/hr  Q24H


 IV  3/6/25 15:00


    3/16/25 16:01


15 MLS/HR


 


 Norepinephrine


 Bitartrate 32 mg/


 Sodium Chloride  250 ml @ 


 0.938 mls/


 hr  Q24H


 IV  3/9/25 13:45


    3/22/25 01:21


0.938 MLS/HR


 


 Diagnostic Test


  (Pha)  1 strip  Q6HR


 VI  3/10/25 18:00


    3/24/25 11:50


1 STRIP


 


 Insulin Human


 Regular  FOLLOW


 SLIDING


 SCALE  Q6HR


 SC  3/10/25 18:00


    3/23/25 23:34


4 UNITS


 


 Dextrose  50 ml  UD


 IV  3/10/25 15:30


     





 


 Pantoprazole


 Sodium  40 mg  BID


 IV  3/11/25 22:00


    3/24/25 11:26


40 MG


 


 Guaifenesin/


 Dextromethorphan  10 ml  Q4HP  PRN


 PO  3/12/25 17:30


    3/23/25 22:26


10 ML


 


 Hydrocortisone


 Sodium Succinate  50 mg  Q6HR


 IV  3/16/25 00:00


    3/24/25 05:35


50 MG


 


 Mupirocin  1 applic  BID


 TOP  3/18/25 22:00


    3/24/25 11:28


1 APPLIC


 


 Amiodarone HCl  200 mg  DAILY


 PO  3/19/25 10:00


    3/24/25 11:26


200 MG


 


 Levothyroxine


 Sodium  100 mcg  QAM


 PO  3/21/25 08:16


    3/24/25 07:39


100 MCG


 


 Dimethicone  80 mg  BID


 PO  3/22/25 22:00


    3/24/25 11:27


80 MG


 


 Midodrine  15 mg  DAILY@0600,1200,1800


 PO  3/23/25 18:00


    3/24/25 05:36


15 MG


 


 Polyethylene


 Glycol  17 gm  BID


 PO  3/23/25 22:00


    3/24/25 11:26


17 GM


 


 Epoetin Kiran-epbx  10,000 unit  MWF


 SC  3/24/25 10:00


     





 


 Lactulose  15 ml  TID


 PO  3/24/25 14:00


     











Examination:  GENERAL:Abnormal, LUNGS:Abnormal


laboratory and microbiology


                                Laboratory Tests


3/24/25 03:15

















Test


 3/24/25


03:15 Range/Units


 


 


Serum Glucose 135 H   mg/dL








                                  Microbiology








 Date/Time


Source Procedure


Growth Status





 


 3/23/25 16:00


Peritoneal Fluid Gram Stain - Final


 Resulted


 


 3/23/25 16:00


Peritoneal Fluid Body Fluid Culture - Preliminary


 Resulted


 


 3/8/25 16:30


Blood Blood Culture - Final


NO GROWTH AFTER 5 DAYS OF INCUBATION. Complete


 


 3/5/25 23:00


Nose MRSA Screen - Final


 Complete











Problem List/Assessment/Plan


Problem List/Assessment/Plan


ESRD on PD


hypervolemia


septic shock


constipation w/ impaction








PD cycler  overnight


manual exchange today to eval for Uf failure


currently has temp femoral if fails


lactulose TID


strict I/O





case discussed with patient daughter and PD modification pending results


Plan discussed with:  Other (sister)





My Orders


My Orders





                          Orders - SOPHIA SALAZAR MD








Procedure Category Date Status





  Time 


 


Document Fluid Input DARLYN 3/24/25 In Process





And Outpu  10:20 


 


Lactulose Oral PHA 3/24/25 In Process





  14:00 











Dietary Evaluation Review


Comments:  


Encourage high protein diet. consider Nepro 240ml 19, 432 kcal. PO  


supplements BID


Expected Outcomes/Goals:  


maintain protein levels WNL


Critical Care Time (mins):  70











SOPHIA SALAZAR MD           Mar 24, 2025 12:33

## 2025-03-24 NOTE — DVHPN2
Progress Note - Dictate


Date Seen:  Mar 24, 2025


Medical Necessity Reason


Pt with a Central, PICC or Fol:  Yes


The following are medically ne:  Central Line, Nieves Catheter


Reason for nieves catheter:  Strict I&O


Subjective


Patient seen and examined at bedside.


Breathing on room air


Overnight events reviewed.


vital signs





                                   Vital Sign








  Date Time  Temp Pulse Resp B/P (MAP) Pulse Ox O2 Delivery O2 Flow Rate FiO2


 


3/24/25 19:06  70 14  98   


 


3/24/25 19:06      Room Air* 0 21


 


3/24/25 19:00    110/42 (64)    


 


3/24/25 16:00 98.6       





 98.6       














                           Total Intake and Output   


 


 3/23/25 3/23/25 3/24/25





 15:00 23:00 07:00


 


Intake Total 6.566 ml 727.504 ml 981.504 ml


 


Output Total  1490 ml 


 


Balance 6.566 ml -762.496 ml 981.504 ml








medications





                               Current Medications








 Medications  Dose


 Ordered  Sig/Anthony


 Route  Start Time


 Stop Time Status Last Admin


Dose Admin


 


 Ondansetron HCl  4 mg  Q4HP  PRN


 IV  3/4/25 17:30


    3/19/25 10:18


4 MG


 


 Acetaminophen  650 mg  Q6HP  PRN


 PO  3/4/25 17:30


    3/19/25 10:18


650 MG


 


 Nitroglycerin  0.4 mg  Q5MINP  PRN


 SL  3/4/25 17:30


     





 


 Atorvastatin


 Calcium  10 mg  HS


 PO  3/4/25 22:00


    3/23/25 21:45


10 MG


 


 Docusate Sodium  100 mg  BIDP  PRN


 PO  3/4/25 21:00


     





 


 Budesonide  0.5 mg  BID


 NEB  3/4/25 22:00


    3/24/25 19:06


0.5 MG


 


 Levalbuterol HCl  0.625 mg  Q6HR


 NEB  3/6/25 06:00


    3/24/25 19:06


0.625 MG


 


 Phenylephrine HCl


 80 mg/Sodium


 Chloride  250 ml @ 


 7.5 mls/hr  Q24H


 IV  3/6/25 15:00


    3/16/25 16:01


15 MLS/HR


 


 Diagnostic Test


  (Pha)  1 strip  Q6HR


 VI  3/10/25 18:00


    3/24/25 17:20


1 STRIP


 


 Insulin Human


 Regular  FOLLOW


 SLIDING


 SCALE  Q6HR


 SC  3/10/25 18:00


    3/23/25 23:34


4 UNITS


 


 Dextrose  50 ml  UD


 IV  3/10/25 15:30


     





 


 Pantoprazole


 Sodium  40 mg  BID


 IV  3/11/25 22:00


    3/24/25 11:26


40 MG


 


 Guaifenesin/


 Dextromethorphan  10 ml  Q4HP  PRN


 PO  3/12/25 17:30


    3/23/25 22:26


10 ML


 


 Hydrocortisone


 Sodium Succinate  50 mg  Q6HR


 IV  3/16/25 00:00


    3/24/25 17:20


50 MG


 


 Mupirocin  1 applic  BID


 TOP  3/18/25 22:00


    3/24/25 11:28


1 APPLIC


 


 Amiodarone HCl  200 mg  DAILY


 PO  3/19/25 10:00


    3/24/25 11:26


200 MG


 


 Levothyroxine


 Sodium  100 mcg  QAM


 PO  3/21/25 08:16


    3/24/25 07:39


100 MCG


 


 Dimethicone  80 mg  BID


 PO  3/22/25 22:00


    3/24/25 11:27


80 MG


 


 Midodrine  15 mg  DAILY@0600,1200,1800


 PO  3/23/25 18:00


    3/24/25 17:20


15 MG


 


 Polyethylene


 Glycol  17 gm  BID


 PO  3/23/25 22:00


    3/24/25 11:26


17 GM


 


 Epoetin Kiran-epbx  10,000 unit  MWF


 SC  3/24/25 10:00


    3/24/25 12:36


10,000 UNIT


 


 Lactulose  15 ml  TID


 PO  3/24/25 14:00


    3/24/25 14:00


15 ML


 


 Norepinephrine


 Bitartrate 32 mg/


 Sodium Chloride  250 ml @ 


 0.234 mls/


 hr  Q24H


 IV  3/24/25 13:15


    3/24/25 13:28


0.234 MLS/HR


 


 Norepinephrine


 Bitartrate 32 mg/


 Sodium Chloride  250 ml @ 0


 mls/hr  Q0M


 IV  3/24/25 13:15


   UNV  











objective


Gen.: Patient lying in bed in no apparent distress. On room air


Head: Normocephalic, atraumatic.


Eyes: EOMI/PERRLA.


Ears: Normal hearing. Normal anatomy.


Neck/trachea: Trachea midline, supple.


Nose: Normal external anatomy.


Mouth: Moist mucous membranes.


Chest: Decreased air entry bilaterally. No wheezing or rhonchi.


Cardiovascular: Positive S1, positive S2. Regular rate and rhythm.


Abdomen: Positive bowel sounds in all 4 quadrants. Soft, non-tender, non-

distended.


: Deferred.


Rectal: Deferred.


Skin: Warm, dry. Intact.


Extremities: 2+ radial pulses bilaterally. No lower extremity edema.


Neuro: Awake, alert, oriented x3. No gross motor or sensory deficits. Cranial 

nerves II through XII intact. Gait not assessed.


laboratory and microbiology


                                Laboratory Tests


3/24/25 03:15

















Test


 3/24/25


03:15 Range/Units


 


 


Serum Glucose 135 H   mg/dL








Assessment/Plan


Impression:


Acute hypoxic respiratory failure


Shock


Atrial fibrillation, rate controlled


Lactic acidosis


End-stage renal disease, on peritoneal dialysis


Obesity





Events:


Remains on room air


Supplemental oxygen PRN





Awaiting decision for hemodialysis vs. peritoneal dialysis


Follow up Nephrology recommendations





On pressors for hemodynamic support


Levophed 1 mcg/min


Titrate to keep mean arterial pressure greater than 65 mmHg


Improving pressor requirements





Head of bed elevation


Aspiration precautions





On midodrine - monitor blood pressure.





Continue steroids - Hydrocortisone 50 mg IV q.6 hours.


Incentive spirometry





On PO amiodarone





Continue Protonix BID


Clinimix for nutritional support





Monitor hemoglobin


Transfuse if less than 7.0 g/dL.





Wound care.





HD per Nephrology


PD per Nephrology


Monitor renal function





Labs and imaging reviewed.


Rest of plan as noted below.





Plan:


Supplemental oxygen PRN


Titrate to keep O2 sats above 92%.





S/p left femoral Lenin cath





Continue steroids


Incentive spirometry





Amiodarone PO


Cardiology recs appreciated.





Pressors as necessary for hemodynamic support


Titrate to keep mean arterial pressure greater than 65 mmHg.


Monitor blood pressure





Monitor renal function.


Monitor electrolytes. Supplement as necessary.


Monitor ins and outs.


Nephrology recommendations appreciated





Diet and lifestyle modifications for weight reduction


Obesity - complicates all care





DVT prophylaxis.





Prognosis: Poor given patient's multiple co-morbidities.


Condition: Critical





Rest of plan per hospitalist and other consultants.





A total of 35 minutes of critical care time was spent reviewing the patient 

record, examining the patient, making a diagnostic and therapeutic plan, 

discussing this plan with the medical personnel, following up on diagnostic 

studies and following the patient for clinical stability excluding any and all 

procedures.  At least 50% of this time was spent in direct, face-to-face 

contact.





Thank you, Dr. Platt, for allowing me to participate in this patient's care.


Further recommendations will depend on the patient's clinical course.


Please do not hesitate to contact me if you have any questions or concerns.





This medical document was created using an electronic medical record system with

Dragon computerized dictation system. Although these documentations are being 

carefully reviewed, there may still be some phonetic and typographical changes. 

The errors are purely typographical, due to imperfection on the software 

program, and do not reflect any compromise in the patient's medical care.





Dietary Evaluation Review


Comments:  


Encourage high protein diet. consider Nepro 240ml 19, 432 kcal. PO  


supplements BID


Expected Outcomes/Goals:  


maintain protein levels WNL


Plan discussed with:  Other (BALDO Oleary)


Critical Care Time(min):  35











JOSEPH SUAREZ MD             Mar 24, 2025 20:05

## 2025-03-24 NOTE — DVHPN2
Progress Note - Dictate


Date Seen:  Mar 24, 2025


Medical Necessity Reason


Pt with a Central, PICC or Fol:  Yes


Subjective


Patient is undergoing peritoneal dialysis, daughter at bedside.





Hemoglobin has been improved.





WBC normal


vital signs





                                   Vital Sign








  Date Time  Temp Pulse Resp B/P (MAP) Pulse Ox O2 Delivery O2 Flow Rate FiO2


 


3/24/25 08:00 97.8 83 14 101/50 (67) 100   





 97.8       


 


3/24/25 08:00      Room Air* 0 21














                           Total Intake and Output   


 


 3/23/25 3/23/25 3/24/25





 14:59 22:59 06:59


 


Intake Total 6.566 ml 727.504 ml 981.504 ml


 


Output Total  1490 ml 


 


Balance 6.566 ml -762.496 ml 981.504 ml








medications





                               Current Medications








 Medications  Dose


 Ordered  Sig/Anthony


 Route  Start Time


 Stop Time Status Last Admin


Dose Admin


 


 Ondansetron HCl  4 mg  Q4HP  PRN


 IV  3/4/25 17:30


    3/19/25 10:18


4 MG


 


 Acetaminophen  650 mg  Q6HP  PRN


 PO  3/4/25 17:30


    3/19/25 10:18


650 MG


 


 Nitroglycerin  0.4 mg  Q5MINP  PRN


 SL  3/4/25 17:30


     





 


 Atorvastatin


 Calcium  10 mg  HS


 PO  3/4/25 22:00


    3/23/25 21:45


10 MG


 


 Docusate Sodium  100 mg  BIDP  PRN


 PO  3/4/25 21:00


     





 


 Budesonide  0.5 mg  BID


 NEB  3/4/25 22:00


    3/24/25 07:03


0.5 MG


 


 Levalbuterol HCl  0.625 mg  Q6HR


 NEB  3/6/25 06:00


    3/24/25 07:03


0.625 MG


 


 Phenylephrine HCl


 80 mg/Sodium


 Chloride  250 ml @ 


 7.5 mls/hr  Q24H


 IV  3/6/25 15:00


    3/16/25 16:01


15 MLS/HR


 


 Norepinephrine


 Bitartrate 32 mg/


 Sodium Chloride  250 ml @ 


 0.938 mls/


 hr  Q24H


 IV  3/9/25 13:45


    3/22/25 01:21


0.938 MLS/HR


 


 Diagnostic Test


  (Pha)  1 strip  Q6HR


 VI  3/10/25 18:00


    3/24/25 05:36


1 STRIP


 


 Insulin Human


 Regular  FOLLOW


 SLIDING


 SCALE  Q6HR


 SC  3/10/25 18:00


    3/23/25 23:34


4 UNITS


 


 Dextrose  50 ml  UD


 IV  3/10/25 15:30


     





 


 Pantoprazole


 Sodium  40 mg  BID


 IV  3/11/25 22:00


    3/23/25 21:45


40 MG


 


 Guaifenesin/


 Dextromethorphan  10 ml  Q4HP  PRN


 PO  3/12/25 17:30


    3/23/25 22:26


10 ML


 


 Hydrocortisone


 Sodium Succinate  50 mg  Q6HR


 IV  3/16/25 00:00


    3/24/25 05:35


50 MG


 


 Mupirocin  1 applic  BID


 TOP  3/18/25 22:00


    3/23/25 21:49


1 APPLIC


 


 Amiodarone HCl  200 mg  DAILY


 PO  3/19/25 10:00


    3/23/25 10:29


200 MG


 


 Levothyroxine


 Sodium  100 mcg  QAM


 PO  3/21/25 08:16


    3/24/25 07:39


100 MCG


 


 Dimethicone  80 mg  BID


 PO  3/22/25 22:00


    3/23/25 21:48


80 MG


 


 Midodrine  15 mg  DAILY@0600,1200,1800


 PO  3/23/25 18:00


    3/24/25 05:36


15 MG


 


 Polyethylene


 Glycol  17 gm  BID


 PO  3/23/25 22:00


    3/23/25 21:54


17 GM


 


 Epoetin Kiran-epbx  10,000 unit  MWF


 SC  3/24/25 10:00


     











objective


General Appearance:  Alert, , mild distress


HEENT:  Atraumatic, PERRLA, EOMI


Respiratory:  Clear to auscultation, Normal air movement


Cardiovascular:  Normal S1, Normal S2, Other (afib)


Abdominal:  non tender. 


Extremities:  No clubbing, No cyanosis, Normal pulses ,edema +


Skin:  No rashes, No breakdown


Neuro:  grossly intact


laboratory and microbiology


                                Laboratory Tests


3/24/25 03:15

















Test


 3/24/25


03:15 Range/Units


 


 


Serum Glucose 135 H   mg/dL








Assessment/Plan


Patient is a 82-year-old female presents to the hospital with:





Hypotension/Shock


Lactic Acidosis 


Afib uncontrolled


Chest pain


Hyperkalemia


ESRD on PD


Chronic abdominal pain


Diarrhea 





Recommendations:


Patient is on low-dose Levophed. She is on 1 pressor.





Stopped Antibiotics


Pulm/ crit care on board


follow blood culture: no growth


no clear source of infection





03/24, Chest x-ray showed Bibasilar opacities which may reflect atelectasis or 

pneumonia.





hypotension is likely multifactorial cardiac due to Afib with rvr vs medication 

induced/ acidosis





s/p  Peritoneal fluid analysis: cell count not qualifying for peritonitis. 

cultures are also negative 








Antibiotic status:


Meropenem IV [Restarted on 03/10 - 03/21]


Vancomycin IV [Started on 03/05 - 03/08]





03/19, Vancomycin Random is 15.0





03/04, Abdomen Pelvis CT showed No acute intra-abdominal finding. Isodense 

lesion of the right mid kidney measuring 1.2 cm, attention on follow-up is 

recommended.  


Compression fracture of L2 and possibly superior endplate of L1 with mild 

posterior extension resulting in mild spinal canal stenosis, age-indeterminate  





03/04, Blood culture showed no growth





03/06, Fluid culture preliminary showed no growth





03/05, MRSA screening negative





critical care time 35 minutes which includes assessment, coordinating plan with 

nurse and consultants. 





prognosis poor


plan discussed with RN





Thank you for consult.





Dietary Evaluation Review


Comments:  


Encourage high protein diet. consider Nepro 240ml 19, 432 kcal. PO  


supplements BID


Expected Outcomes/Goals:  


maintain protein levels WNL











GELA ELLISON MD            Mar 24, 2025 09:38

## 2025-03-25 NOTE — DVHPN2
Progress Note - Dictate


Date Seen:  Mar 25, 2025


Medical Necessity Reason


Pt with a Central, PICC or Fol:  Yes


The following are medically ne:  Central Line, Nieves Catheter


Reason for nieves catheter:  Strict I&O


Subjective


No new acute complaints noted at this time.


Patient is undergoing peritoneal dialysis, daughter at bedside.





Hemoglobin has been improved.





WBC normal


vital signs





                                   Vital Sign








  Date Time  Temp Pulse Resp B/P (MAP) Pulse Ox O2 Delivery O2 Flow Rate FiO2


 


3/25/25 12:00 97.9 63 12 114/42 (66) 97   





 97.9       


 


3/25/25 10:00      Room Air* 0 21














                           Total Intake and Output   


 


 3/24/25 3/24/25 3/25/25





 15:00 23:00 07:00


 


Intake Total 6.096 ml 19928.107 ml 453.283 ml


 


Balance 6.096 ml 81337.107 ml 453.283 ml








medications





                               Current Medications








 Medications  Dose


 Ordered  Sig/Anthony


 Route  Start Time


 Stop Time Status Last Admin


Dose Admin


 


 Ondansetron HCl  4 mg  Q4HP  PRN


 IV  3/4/25 17:30


    3/19/25 10:18


4 MG


 


 Acetaminophen  650 mg  Q6HP  PRN


 PO  3/4/25 17:30


    3/19/25 10:18


650 MG


 


 Nitroglycerin  0.4 mg  Q5MINP  PRN


 SL  3/4/25 17:30


     





 


 Atorvastatin


 Calcium  10 mg  HS


 PO  3/4/25 22:00


    3/24/25 21:40


10 MG


 


 Docusate Sodium  100 mg  BIDP  PRN


 PO  3/4/25 21:00


     





 


 Budesonide  0.5 mg  BID


 NEB  3/4/25 22:00


    3/25/25 06:03


0.5 MG


 


 Levalbuterol HCl  0.625 mg  Q6HR


 NEB  3/6/25 06:00


    3/25/25 11:40


0.625 MG


 


 Phenylephrine HCl


 80 mg/Sodium


 Chloride  250 ml @ 


 7.5 mls/hr  Q24H


 IV  3/6/25 15:00


    3/16/25 16:01


15 MLS/HR


 


 Diagnostic Test


  (Pha)  1 strip  Q6HR


 VI  3/10/25 18:00


    3/25/25 12:03


1 STRIP


 


 Insulin Human


 Regular  FOLLOW


 SLIDING


 SCALE  Q6HR


 SC  3/10/25 18:00


    3/25/25 00:00


4 UNITS


 


 Dextrose  50 ml  UD


 IV  3/10/25 15:30


     





 


 Pantoprazole


 Sodium  40 mg  BID


 IV  3/11/25 22:00


    3/25/25 09:48


40 MG


 


 Guaifenesin/


 Dextromethorphan  10 ml  Q4HP  PRN


 PO  3/12/25 17:30


    3/23/25 22:26


10 ML


 


 Hydrocortisone


 Sodium Succinate  50 mg  Q6HR


 IV  3/16/25 00:00


    3/25/25 12:07


50 MG


 


 Mupirocin  1 applic  BID


 TOP  3/18/25 22:00


    3/25/25 09:50


1 APPLIC


 


 Amiodarone HCl  200 mg  DAILY


 PO  3/19/25 10:00


    3/25/25 09:48


200 MG


 


 Levothyroxine


 Sodium  100 mcg  QAM


 PO  3/21/25 08:16


    3/25/25 07:31


100 MCG


 


 Dimethicone  80 mg  BID


 PO  3/22/25 22:00


    3/25/25 09:48


80 MG


 


 Midodrine  15 mg  DAILY@0600,1200,1800


 PO  3/23/25 18:00


    3/25/25 12:07


15 MG


 


 Polyethylene


 Glycol  17 gm  BID


 PO  3/23/25 22:00


    3/25/25 09:49


17 GM


 


 Epoetin Kiran-epbx  10,000 unit  MWF


 SC  3/24/25 10:00


    3/24/25 12:36


10,000 UNIT


 


 Lactulose  15 ml  TID


 PO  3/24/25 14:00


    3/25/25 05:35


15 ML


 


 Norepinephrine


 Bitartrate 32 mg/


 Sodium Chloride  250 ml @ 


 0.234 mls/


 hr  Q24H


 IV  3/24/25 13:15


    3/24/25 13:28


0.234 MLS/HR


 


 Norepinephrine


 Bitartrate 32 mg/


 Sodium Chloride  250 ml @ 0


 mls/hr  Q0M


 IV  3/24/25 13:15


   UNV  





 


 Nystatin  5 ml  TID


 MT  3/25/25 14:00


   UNV  











objective


General Appearance:  Alert, , mild distress


HEENT:  Atraumatic, PERRLA, EOMI


Respiratory:  Clear to auscultation, Normal air movement


Cardiovascular:  Normal S1, Normal S2, Other (afib)


Abdominal:  non tender. 


Extremities:  No clubbing, No cyanosis, Normal pulses ,edema +


Skin:  No rashes, No breakdown


Neuro:  grossly intact


laboratory and microbiology


                                Laboratory Tests


3/25/25 10:40








3/25/25 03:00

















Test


 3/25/25


03:00 Range/Units


 


 


Serum Glucose 164 H   mg/dL








Assessment/Plan


Patient is a 82-year-old female presents to the hospital with:





Hypotension/Shock


Lactic Acidosis 


Afib uncontrolled


Chest pain


Hyperkalemia


ESRD on PD


Chronic abdominal pain


Diarrhea 





Recommendations:


Patient is on 2 pressors. 





Stopped Antibiotics


Pulm/ crit care on board


follow blood culture: no growth


no clear source of infection





03/23, Fluid culture showed no growth





03/25, Chest x-ray showed Improved aeration in the lung fields.





hypotension is likely multifactorial cardiac due to Afib with rvr vs medication 

induced/ acidosis





s/p  Peritoneal fluid analysis: cell count not qualifying for peritonitis. 

cultures are also negative 








Antibiotic status:


Meropenem IV [Restarted on 03/10 - 03/21]


Vancomycin IV [Started on 03/05 - 03/08]





03/19, Vancomycin Random is 15.0





03/04, Abdomen Pelvis CT showed No acute intra-abdominal finding. Isodense 

lesion of the right mid kidney measuring 1.2 cm, attention on follow-up is 

recommended.  


Compression fracture of L2 and possibly superior endplate of L1 with mild 

posterior extension resulting in mild spinal canal stenosis, age-indeterminate  





03/04, Blood culture showed no growth





03/06, Fluid culture preliminary showed no growth





03/05, MRSA screening negative





critical care time 35 minutes which includes assessment, coordinating plan with 

nurse and consultants. 





prognosis poor


plan discussed with RN





Thank you for consult.





Dietary Evaluation Review


Comments:  


Encourage high protein diet. consider Nepro 240ml 19, 432 kcal. PO  


supplements BID


Expected Outcomes/Goals:  


maintain protein levels WNL











GELA ELLISON MD            Mar 25, 2025 12:29

## 2025-03-25 NOTE — [facility]
CHEST RADIOGRAPH



Indication: PROTOCOL



Technique: Single frontal view of the chest was obtained



Comparison: XY CHEST PORTABLE on DOS: 3/24/25



FINDINGS: 



Lines and Tubes: There is a right central venous catheter with its tip terminating in the superior ve
na cava.



Lungs: Improved aeration in the lung fields.



Pleura: No effusion.



No pneumothorax. 



Cardiomediastinal contours: Unremarkable



Bones: No acute osseous abnormality.



IMPRESSION:



1. Improved aeration in the lung fields.



Electronically Signed by: Katie Stover at 03/25/2025 04:54:55 AM

## 2025-03-25 NOTE — DVHPN2
Progress Note - Dictate


Date Seen:  Mar 25, 2025


Medical Necessity Reason


Pt with a Central, PICC or Fol:  Yes


The following are medically ne:  Central Line, Nieves Catheter


Reason for nieves catheter:  Strict I&O


Subjective


Patient remains clinically stable.  her nurse is at bedside.  Says she feels 

okay.  No complaints of chest pain or shortness for breath or abdominal 

discomfort.


vital signs





                                   Vital Sign








  Date Time  Temp Pulse Resp B/P (MAP) Pulse Ox O2 Delivery O2 Flow Rate FiO2


 


3/25/25 12:00 97.9 63 12 114/42 (66) 97   





 97.9       


 


3/25/25 10:00      Room Air* 0 21














                           Total Intake and Output   


 


 3/24/25 3/24/25 3/25/25





 15:00 23:00 07:00


 


Intake Total 6.096 ml 16794.107 ml 453.283 ml


 


Balance 6.096 ml 94915.107 ml 453.283 ml








medications





                               Current Medications








 Medications  Dose


 Ordered  Sig/Anthony


 Route  Start Time


 Stop Time Status Last Admin


Dose Admin


 


 Ondansetron HCl  4 mg  Q4HP  PRN


 IV  3/4/25 17:30


    3/19/25 10:18


4 MG


 


 Acetaminophen  650 mg  Q6HP  PRN


 PO  3/4/25 17:30


    3/19/25 10:18


650 MG


 


 Nitroglycerin  0.4 mg  Q5MINP  PRN


 SL  3/4/25 17:30


     





 


 Atorvastatin


 Calcium  10 mg  HS


 PO  3/4/25 22:00


    3/24/25 21:40


10 MG


 


 Docusate Sodium  100 mg  BIDP  PRN


 PO  3/4/25 21:00


     





 


 Budesonide  0.5 mg  BID


 NEB  3/4/25 22:00


    3/25/25 06:03


0.5 MG


 


 Levalbuterol HCl  0.625 mg  Q6HR


 NEB  3/6/25 06:00


    3/25/25 11:40


0.625 MG


 


 Phenylephrine HCl


 80 mg/Sodium


 Chloride  250 ml @ 


 7.5 mls/hr  Q24H


 IV  3/6/25 15:00


    3/16/25 16:01


15 MLS/HR


 


 Diagnostic Test


  (Pha)  1 strip  Q6HR


 VI  3/10/25 18:00


    3/25/25 12:03


1 STRIP


 


 Insulin Human


 Regular  FOLLOW


 SLIDING


 SCALE  Q6HR


 SC  3/10/25 18:00


    3/25/25 00:00


4 UNITS


 


 Dextrose  50 ml  UD


 IV  3/10/25 15:30


     





 


 Pantoprazole


 Sodium  40 mg  BID


 IV  3/11/25 22:00


    3/25/25 09:48


40 MG


 


 Guaifenesin/


 Dextromethorphan  10 ml  Q4HP  PRN


 PO  3/12/25 17:30


    3/23/25 22:26


10 ML


 


 Hydrocortisone


 Sodium Succinate  50 mg  Q6HR


 IV  3/16/25 00:00


    3/25/25 12:07


50 MG


 


 Mupirocin  1 applic  BID


 TOP  3/18/25 22:00


    3/25/25 09:50


1 APPLIC


 


 Amiodarone HCl  200 mg  DAILY


 PO  3/19/25 10:00


    3/25/25 09:48


200 MG


 


 Levothyroxine


 Sodium  100 mcg  QAM


 PO  3/21/25 08:16


    3/25/25 07:31


100 MCG


 


 Dimethicone  80 mg  BID


 PO  3/22/25 22:00


    3/25/25 09:48


80 MG


 


 Midodrine  15 mg  DAILY@0600,1200,1800


 PO  3/23/25 18:00


    3/25/25 12:07


15 MG


 


 Polyethylene


 Glycol  17 gm  BID


 PO  3/23/25 22:00


    3/25/25 09:49


17 GM


 


 Epoetin Kiran-epbx  10,000 unit  MWF


 SC  3/24/25 10:00


    3/24/25 12:36


10,000 UNIT


 


 Lactulose  15 ml  TID


 PO  3/24/25 14:00


    3/25/25 05:35


15 ML


 


 Norepinephrine


 Bitartrate 32 mg/


 Sodium Chloride  250 ml @ 


 0.234 mls/


 hr  Q24H


 IV  3/24/25 13:15


    3/24/25 13:28


0.234 MLS/HR


 


 Norepinephrine


 Bitartrate 32 mg/


 Sodium Chloride  250 ml @ 0


 mls/hr  Q0M


 IV  3/24/25 13:15


   UNV  





 


 Nystatin  5 ml  TID


 MT  3/25/25 14:00


     











objective


Elderly female in bed without significant distress.  HEENT notable for movement 

phase.  Heart regular rate and rhythm S1-S2.  Lungs fair air movement with poor 

inspiratory effort.  Abdomen soft positive bowel sounds.  Extremities positive 

edema.


laboratory and microbiology


                                Laboratory Tests


3/25/25 10:40








3/25/25 03:00

















Test


 3/25/25


03:00 Range/Units


 


 


Serum Glucose 164 H   mg/dL








Assessment/Plan


Patient remained stable overnight.  Still on low-dose Levophed for Nephrology 

recommendations.  Nephrologist re-evaluated today and make further 

recommendations.  Patient having bowel movements.  Continue current laxatives.  

Continue peritoneal dialysis per Nephrology recommendations.  Physical therapy 

and out of bed is encouraged and bed to chair as tolerated.  Discussed with the 

patient importance of activity/mobility given a prolonged hospital bed stay.  

Discussed with the nurse at bedside regarding care plan.


Problems(with codes):  


(1) ESRD (end stage renal disease) on dialysis


(2) Abdominal pain


(3) Hypothyroidism


(4) History of peritoneal dialysis


(5) Chronic kidney disease


(6) Hypotension





Dietary Evaluation Review


Comments:  


Encourage high protein diet. consider Nepro 240ml 19, 432 kcal. PO  


supplements BID


Expected Outcomes/Goals:  


maintain protein levels WNL


Plan discussed with:  Other











BEATRIS CARY MD      Mar 25, 2025 13:40

## 2025-03-25 NOTE — DVHPN2
Progress Note


Date Seen:  Mar 25, 2025


Medical Necessity Reason


Pt with a Central, PICC or Fol:  Yes


The following are medically ne:  Central Line, Nieves Catheter


Reason for nieves catheter:  Strict I&O





Subjective


Patient reports:  Feels better





Objective


vital signs





                                   Vital Sign








  Date Time  Temp Pulse Resp B/P (MAP) Pulse Ox O2 Delivery O2 Flow Rate FiO2


 


3/25/25 18:45  68 14 105/46 (65) 96   


 


3/25/25 18:00      Room Air* 0 21


 


3/25/25 16:00 97.8       





 97.8       














                           Total Intake and Output   


 


 3/24/25 3/24/25 3/25/25





 15:00 23:00 07:00


 


Intake Total 6.096 ml 08953.107 ml 453.283 ml


 


Balance 6.096 ml 04906.107 ml 453.283 ml








medications





                               Current Medications








 Medications  Dose


 Ordered  Sig/Anthony


 Route  Start Time


 Stop Time Status Last Admin


Dose Admin


 


 Ondansetron HCl  4 mg  Q4HP  PRN


 IV  3/4/25 17:30


    3/19/25 10:18


4 MG


 


 Acetaminophen  650 mg  Q6HP  PRN


 PO  3/4/25 17:30


    3/19/25 10:18


650 MG


 


 Nitroglycerin  0.4 mg  Q5MINP  PRN


 SL  3/4/25 17:30


     





 


 Atorvastatin


 Calcium  10 mg  HS


 PO  3/4/25 22:00


    3/24/25 21:40


10 MG


 


 Docusate Sodium  100 mg  BIDP  PRN


 PO  3/4/25 21:00


     





 


 Budesonide  0.5 mg  BID


 NEB  3/4/25 22:00


    3/25/25 18:48


0.5 MG


 


 Levalbuterol HCl  0.625 mg  Q6HR


 NEB  3/6/25 06:00


    3/25/25 18:48


0.625 MG


 


 Phenylephrine HCl


 80 mg/Sodium


 Chloride  250 ml @ 


 7.5 mls/hr  Q24H


 IV  3/6/25 15:00


    3/16/25 16:01


15 MLS/HR


 


 Diagnostic Test


  (Pha)  1 strip  Q6HR


 VI  3/10/25 18:00


    3/25/25 17:38


1 STRIP


 


 Insulin Human


 Regular  FOLLOW


 SLIDING


 SCALE  Q6HR


 SC  3/10/25 18:00


    3/25/25 00:00


4 UNITS


 


 Dextrose  50 ml  UD


 IV  3/10/25 15:30


     





 


 Pantoprazole


 Sodium  40 mg  BID


 IV  3/11/25 22:00


    3/25/25 09:48


40 MG


 


 Guaifenesin/


 Dextromethorphan  10 ml  Q4HP  PRN


 PO  3/12/25 17:30


    3/23/25 22:26


10 ML


 


 Hydrocortisone


 Sodium Succinate  50 mg  Q6HR


 IV  3/16/25 00:00


    3/25/25 17:38


50 MG


 


 Mupirocin  1 applic  BID


 TOP  3/18/25 22:00


    3/25/25 09:50


1 APPLIC


 


 Amiodarone HCl  200 mg  DAILY


 PO  3/19/25 10:00


    3/25/25 09:48


200 MG


 


 Levothyroxine


 Sodium  100 mcg  QAM


 PO  3/21/25 08:16


    3/25/25 07:31


100 MCG


 


 Dimethicone  80 mg  BID


 PO  3/22/25 22:00


    3/25/25 09:48


80 MG


 


 Midodrine  15 mg  DAILY@0600,1200,1800


 PO  3/23/25 18:00


    3/25/25 17:38


15 MG


 


 Polyethylene


 Glycol  17 gm  BID


 PO  3/23/25 22:00


    3/25/25 09:49


17 GM


 


 Epoetin Kiran-epbx  10,000 unit  MWF


 SC  3/24/25 10:00


    3/24/25 12:36


10,000 UNIT


 


 Lactulose  15 ml  TID


 PO  3/24/25 14:00


    3/25/25 14:00


15 ML


 


 Norepinephrine


 Bitartrate 32 mg/


 Sodium Chloride  250 ml @ 


 0.234 mls/


 hr  Q24H


 IV  3/24/25 13:15


    3/24/25 13:28


0.234 MLS/HR


 


 Norepinephrine


 Bitartrate 32 mg/


 Sodium Chloride  250 ml @ 0


 mls/hr  Q0M


 IV  3/24/25 13:15


   UNV  





 


 Nystatin  5 ml  TID


 MT  3/25/25 14:00


    3/25/25 14:00


5 ML








Examination:  GENERAL:Abnormal, LUNGS:Abnormal


laboratory and microbiology


                                Laboratory Tests


3/25/25 10:40








3/25/25 03:00

















Test


 3/25/25


03:00 Range/Units


 


 


Serum Glucose 164 H   mg/dL








                                  Microbiology








 Date/Time


Source Procedure


Growth Status





 


 3/23/25 16:00


Peritoneal Fluid Gram Stain - Final


 Resulted


 


 3/23/25 16:00


Peritoneal Fluid Body Fluid Culture - Preliminary


 Resulted


 


 3/8/25 16:30


Blood Blood Culture - Final


NO GROWTH AFTER 5 DAYS OF INCUBATION. Complete


 


 3/5/25 23:00


Nose MRSA Screen - Final


 Complete











Problem List/Assessment/Plan


Problem List/Assessment/Plan


ESRD on PD


hypervolemia- diastolic HF ? vs PD fluid absorption from fast exchanger


septic shock


constipation w/ impaction


hypotension








PD cycler  overnight tolerated better last night program adjusted to increase UF


manual exchange today to eval for Uf failure 4.25% exchange for today


discussed will remove fem catheter tomorrow if overnight PD session goes well


lactulose TID


strict I/O





current regime 12L green , 5 cycles, w/ 1.5L purple as last fill. 


day exchange red 2L today to increase negative balance


Plan discussed with:  Patient, Daughter





Dietary Evaluation Review


Comments:  


Encourage high protein diet. consider Nepro 240ml 19, 432 kcal. PO  


supplements BID


Expected Outcomes/Goals:  


maintain protein levels WNL











SOPHIA SALAZAR MD           Mar 25, 2025 19:43

## 2025-03-26 NOTE — DVHPN2
Progress Note - Dictate


Date Seen:  Mar 26, 2025


Medical Necessity Reason


Pt with a Central, PICC or Fol:  Yes


The following are medically ne:  Central Line, Nieves Catheter


Reason for nieves catheter:  Strict I&O


Subjective


Patient remains clinically stable.  her nurse is at bedside.  Now her Levophed 

dosage need to be increased to keep systolic blood pressure above 95.  Patient 

is not motivated enough to do any physical therapy or activity.  PT evaluation 

is being done.


vital signs





                                   Vital Sign








  Date Time  Temp Pulse Resp B/P (MAP) Pulse Ox O2 Delivery O2 Flow Rate FiO2


 


3/26/25 12:20  99 18  98   


 


3/26/25 11:00    113/75 (88)    


 


3/26/25 10:00      Room Air 0.0 


 


3/26/25 10:00        21


 


3/26/25 08:00 98.0       





 98.0       














                           Total Intake and Output   


 


 3/25/25 3/25/25 3/26/25





 15:00 23:00 07:00


 


Intake Total 2.106 ml 2362.810 ml 156.796 ml


 


Output Total  24917 ml 


 


Balance 2.106 ml -24689.190 ml 156.796 ml








medications





                               Current Medications








 Medications  Dose


 Ordered  Sig/Anthony


 Route  Start Time


 Stop Time Status Last Admin


Dose Admin


 


 Ondansetron HCl  4 mg  Q4HP  PRN


 IV  3/4/25 17:30


    3/19/25 10:18


4 MG


 


 Acetaminophen  650 mg  Q6HP  PRN


 PO  3/4/25 17:30


    3/19/25 10:18


650 MG


 


 Nitroglycerin  0.4 mg  Q5MINP  PRN


 SL  3/4/25 17:30


     





 


 Atorvastatin


 Calcium  10 mg  HS


 PO  3/4/25 22:00


    3/25/25 21:35


10 MG


 


 Docusate Sodium  100 mg  BIDP  PRN


 PO  3/4/25 21:00


     





 


 Budesonide  0.5 mg  BID


 NEB  3/4/25 22:00


    3/26/25 07:17


0.5 MG


 


 Levalbuterol HCl  0.625 mg  Q6HR


 NEB  3/6/25 06:00


    3/26/25 12:20


0.625 MG


 


 Phenylephrine HCl


 80 mg/Sodium


 Chloride  250 ml @ 


 7.5 mls/hr  Q24H


 IV  3/6/25 15:00


    3/16/25 16:01


15 MLS/HR


 


 Diagnostic Test


  (Pha)  1 strip  Q6HR


 VI  3/10/25 18:00


    3/26/25 12:28


1 STRIP


 


 Insulin Human


 Regular  FOLLOW


 SLIDING


 SCALE  Q6HR


 SC  3/10/25 18:00


    3/26/25 06:06


4 UNITS


 


 Dextrose  50 ml  UD


 IV  3/10/25 15:30


     





 


 Pantoprazole


 Sodium  40 mg  BID


 IV  3/11/25 22:00


    3/26/25 10:39


40 MG


 


 Guaifenesin/


 Dextromethorphan  10 ml  Q4HP  PRN


 PO  3/12/25 17:30


    3/23/25 22:26


10 ML


 


 Hydrocortisone


 Sodium Succinate  50 mg  Q6HR


 IV  3/16/25 00:00


    3/26/25 12:37


50 MG


 


 Mupirocin  1 applic  BID


 TOP  3/18/25 22:00


    3/26/25 10:53


1 APPLIC


 


 Amiodarone HCl  200 mg  DAILY


 PO  3/19/25 10:00


    3/26/25 10:39


200 MG


 


 Levothyroxine


 Sodium  100 mcg  QAM


 PO  3/21/25 08:16


    3/26/25 06:06


100 MCG


 


 Dimethicone  80 mg  BID


 PO  3/22/25 22:00


    3/26/25 12:28


80 MG


 


 Midodrine  15 mg  DAILY@0600,1200,1800


 PO  3/23/25 18:00


    3/26/25 12:37


15 MG


 


 Polyethylene


 Glycol  17 gm  BID


 PO  3/23/25 22:00


    3/26/25 10:39


17 GM


 


 Epoetin Kiran-epbx  10,000 unit  MWF


 SC  3/24/25 10:00


    3/24/25 12:36


10,000 UNIT


 


 Lactulose  15 ml  TID


 PO  3/24/25 14:00


    3/26/25 05:52


15 ML


 


 Norepinephrine


 Bitartrate 32 mg/


 Sodium Chloride  250 ml @ 


 0.234 mls/


 hr  Q24H


 IV  3/24/25 13:15


    3/24/25 13:28


0.234 MLS/HR


 


 Norepinephrine


 Bitartrate 32 mg/


 Sodium Chloride  250 ml @ 0


 mls/hr  Q0M


 IV  3/24/25 13:15


   UNV  





 


 Nystatin  5 ml  TID


 MT  3/25/25 14:00


    3/26/25 05:52


5 ML








objective


Elderly female in bed without significant distress.  HEENT notable for movement 

phase.  Heart regular rate and rhythm S1-S2.  Lungs fair air movement with poor 

inspiratory effort.  Abdomen soft positive bowel sounds.  Extremities positive 

edema.


laboratory and microbiology


                                Laboratory Tests


3/26/25 03:00

















Test


 3/26/25


03:00 Range/Units


 


 


Serum Glucose 184 H   mg/dL








Assessment/Plan


Patient remained stable overnight.  Continue Levophed and titrate to keep 

systolic above 90.  Patient once again told and encouraged to take deep breaths 

and use incentive spirometry as well as out of bed to chair given a prolonged 

hospital stay with prolonged bed-bound status.  The risks of wounds as well as 

atelectasis with a pneumonia and respiratory failure is discussed with the 

patient along with the nurse at bedside.  Otherwise for now continue present 

management and further clinical management per clinical course and 

recommendations from nephrologist.  Overall prognosis remains poor due to her 

physical debility.


Problems(with codes):  


(1) ESRD (end stage renal disease) on dialysis


(2) Abdominal pain


(3) History of peritoneal dialysis


(4) Chronic kidney disease


(5) Hypotension





Dietary Evaluation Review


Comments:  


Encourage high protein diet. consider Nepro 240ml 19, 432 kcal. PO  


supplements BID


Expected Outcomes/Goals:  


maintain protein levels WNL


Plan discussed with:  Other











BEATRIS CARY MD      Mar 26, 2025 12:48

## 2025-03-26 NOTE — DVHPN2
Progress Note - Dictate


Date Seen:  Mar 26, 2025


Medical Necessity Reason


Pt with a Central, PICC or Fol:  Yes


The following are medically ne:  Central Line, Nieves Catheter


Reason for nieves catheter:  Strict I&O


Subjective


Patient seen and examined at bedside.


Breathing on room air


Overnight events reviewed.


vital signs





                                   Vital Sign








  Date Time  Temp Pulse Resp B/P (MAP) Pulse Ox O2 Delivery O2 Flow Rate FiO2


 


3/26/25 21:45  90 14 122/51 (74) 96   


 


3/26/25 20:15 98.1       





 98.1       


 


3/26/25 19:15      Room Air* 0 21














                           Total Intake and Output   


 


 3/25/25 3/25/25 3/26/25





 14:59 22:59 06:59


 


Intake Total 2.341 ml 2362.341 ml 157.499 ml


 


Output Total  34326 ml 


 


Balance 2.341 ml -47953.659 ml 157.499 ml








medications





                               Current Medications








 Medications  Dose


 Ordered  Sig/Anthony


 Route  Start Time


 Stop Time Status Last Admin


Dose Admin


 


 Ondansetron HCl  4 mg  Q4HP  PRN


 IV  3/4/25 17:30


    3/19/25 10:18


4 MG


 


 Acetaminophen  650 mg  Q6HP  PRN


 PO  3/4/25 17:30


    3/19/25 10:18


650 MG


 


 Nitroglycerin  0.4 mg  Q5MINP  PRN


 SL  3/4/25 17:30


     





 


 Atorvastatin


 Calcium  10 mg  HS


 PO  3/4/25 22:00


    3/26/25 21:12


10 MG


 


 Docusate Sodium  100 mg  BIDP  PRN


 PO  3/4/25 21:00


     





 


 Budesonide  0.5 mg  BID


 NEB  3/4/25 22:00


    3/26/25 17:59


0.5 MG


 


 Levalbuterol HCl  0.625 mg  Q6HR


 NEB  3/6/25 06:00


    3/26/25 17:59


0.625 MG


 


 Phenylephrine HCl


 80 mg/Sodium


 Chloride  250 ml @ 


 7.5 mls/hr  Q24H


 IV  3/6/25 15:00


    3/16/25 16:01


15 MLS/HR


 


 Diagnostic Test


  (Pha)  1 strip  Q6HR


 VI  3/10/25 18:00


    3/26/25 18:00


1 STRIP


 


 Insulin Human


 Regular  FOLLOW


 SLIDING


 SCALE  Q6HR


 SC  3/10/25 18:00


    3/26/25 06:06


4 UNITS


 


 Dextrose  50 ml  UD


 IV  3/10/25 15:30


     





 


 Pantoprazole


 Sodium  40 mg  BID


 IV  3/11/25 22:00


    3/26/25 21:18


40 MG


 


 Guaifenesin/


 Dextromethorphan  10 ml  Q4HP  PRN


 PO  3/12/25 17:30


    3/23/25 22:26


10 ML


 


 Hydrocortisone


 Sodium Succinate  50 mg  Q6HR


 IV  3/16/25 00:00


    3/26/25 17:57


50 MG


 


 Mupirocin  1 applic  BID


 TOP  3/18/25 22:00


    3/26/25 21:12


1 APPLIC


 


 Amiodarone HCl  200 mg  DAILY


 PO  3/19/25 10:00


    3/26/25 10:39


200 MG


 


 Levothyroxine


 Sodium  100 mcg  QAM


 PO  3/21/25 08:16


    3/26/25 06:06


100 MCG


 


 Dimethicone  80 mg  BID


 PO  3/22/25 22:00


    3/26/25 21:11


80 MG


 


 Midodrine  15 mg  DAILY@0600,1200,1800


 PO  3/23/25 18:00


    3/26/25 17:57


15 MG


 


 Polyethylene


 Glycol  17 gm  BID


 PO  3/23/25 22:00


    3/26/25 21:11


17 GM


 


 Epoetin Kiran-epbx  10,000 unit  MWF


 SC  3/24/25 10:00


    3/24/25 12:36


10,000 UNIT


 


 Lactulose  15 ml  TID


 PO  3/24/25 14:00


    3/26/25 21:12


15 ML


 


 Norepinephrine


 Bitartrate 32 mg/


 Sodium Chloride  250 ml @ 


 0.234 mls/


 hr  Q24H


 IV  3/24/25 13:15


    3/24/25 13:28


0.234 MLS/HR


 


 Norepinephrine


 Bitartrate 32 mg/


 Sodium Chloride  250 ml @ 0


 mls/hr  Q0M


 IV  3/24/25 13:15


   UNV  





 


 Nystatin  5 ml  TID


 MT  3/25/25 14:00


    3/26/25 21:11


5 ML


 


 Albumin Human  100 ml @ 


 100 mls/hr  KRISTI  PRN


 IV  3/26/25 15:15


     











objective


Gen.: Patient lying in bed in no apparent distress. On room air


Head: Normocephalic, atraumatic.


Eyes: EOMI/PERRLA.


Ears: Normal hearing. Normal anatomy.


Neck/trachea: Trachea midline, supple.


Nose: Normal external anatomy.


Mouth: Moist mucous membranes.


Chest: Decreased air entry bilaterally. No wheezing or rhonchi.


Cardiovascular: Positive S1, positive S2. Regular rate and rhythm.


Abdomen: Positive bowel sounds in all 4 quadrants. Soft, non-tender, non-

distended.


: Deferred.


Rectal: Deferred.


Skin: Warm, dry. Intact.


Extremities: 2+ radial pulses bilaterally. No lower extremity edema.


Neuro: Awake, alert, oriented x3. No gross motor or sensory deficits. Cranial 

nerves II through XII intact. Gait not assessed.


laboratory and microbiology


                                Laboratory Tests


3/26/25 03:00

















Test


 3/26/25


03:00 Range/Units


 


 


Serum Glucose 184 H   mg/dL








Assessment/Plan


Impression:


Acute hypoxic respiratory failure


Shock


Atrial fibrillation, rate controlled


Lactic acidosis


End-stage renal disease, on peritoneal dialysis


Obesity





Events:


Remains on room air


Supplemental oxygen PRN





Hemodialysis this PM


Nephrology recommendations appreciated.


Remove Lenin cath when Nephrology recommends





On pressors (Levophed) for hemodynamic support


Titrate to keep mean arterial pressure greater than 65 mmHg


Taper off as tolerated





Head of bed elevation


Aspiration precautions





On midodrine - monitor blood pressure.





Continue steroids - Hydrocortisone 50 mg IV q.6 hours.


Incentive spirometry





On PO amiodarone





Continue Protonix BID


Clinimix for nutritional support





Monitor hemoglobin


Transfuse if less than 7.0 g/dL.





Wound care.





HD/PD per Nephrology


Monitor renal function


Monitor electrolytes. Supplement as necessary.





Labs and imaging reviewed.


Rest of plan as noted below.





Plan:


Supplemental oxygen PRN


Titrate to keep O2 sats above 92%.





S/p left femoral Lenin cath





Continue steroids


Incentive spirometry





Amiodarone PO


Cardiology recs appreciated.





Pressors as necessary for hemodynamic support


Titrate to keep mean arterial pressure greater than 65 mmHg.


Monitor blood pressure





Monitor renal function.


Monitor electrolytes. Supplement as necessary.


Monitor ins and outs.


Nephrology recommendations appreciated





Diet and lifestyle modifications for weight reduction


Obesity - complicates all care





DVT prophylaxis.





Prognosis: Poor given patient's multiple co-morbidities.


Condition: Critical





Rest of plan per hospitalist and other consultants.





A total of 35 minutes of critical care time was spent reviewing the patient 

record, examining the patient, making a diagnostic and therapeutic plan, 

discussing this plan with the medical personnel, following up on diagnostic 

studies and following the patient for clinical stability excluding any and all 

procedures.  At least 50% of this time was spent in direct, face-to-face 

contact.





Thank you, Dr. Platt, for allowing me to participate in this patient's care.


Further recommendations will depend on the patient's clinical course.


Please do not hesitate to contact me if you have any questions or concerns.





This medical document was created using an electronic medical record system with

Dragon computerized dictation system. Although these documentations are being 

carefully reviewed, there may still be some phonetic and typographical changes. 

The errors are purely typographical, due to imperfection on the software 

program, and do not reflect any compromise in the patient's medical care.





Dietary Evaluation Review


Comments:  


Encourage high protein diet. consider Nepro 240ml 19, 432 kcal. PO  


supplements BID


Expected Outcomes/Goals:  


maintain protein levels WNL


Plan discussed with:  Other (BALDO Mcmahan)


Critical Care Time(min):  35











JOSEPH SUAREZ MD             Mar 26, 2025 23:31

## 2025-03-26 NOTE — DVHPN2
Progress Note - Dictate


Date Seen:  Mar 26, 2025


Medical Necessity Reason


Pt with a Central, PICC or Fol:  Yes


The following are medically ne:  Central Line, Nieves Catheter


Reason for nieves catheter:  Strict I&O


Subjective


Patient seen and examined at bedside.


Breathing on room air


Overnight events reviewed.


vital signs





                                   Vital Sign








  Date Time  Temp Pulse Resp B/P (MAP) Pulse Ox O2 Delivery O2 Flow Rate FiO2


 


3/26/25 11:00  109 16 113/75 (88) 100   


 


3/26/25 10:00      Room Air 0.0 


 


3/26/25 10:00        21


 


3/26/25 08:00 98.0       





 98.0       














                           Total Intake and Output   


 


 3/25/25 3/25/25 3/26/25





 15:00 23:00 07:00


 


Intake Total 2.106 ml 2362.810 ml 156.796 ml


 


Output Total  12242 ml 


 


Balance 2.106 ml -90847.190 ml 156.796 ml








medications





                               Current Medications








 Medications  Dose


 Ordered  Sig/Anthony


 Route  Start Time


 Stop Time Status Last Admin


Dose Admin


 


 Ondansetron HCl  4 mg  Q4HP  PRN


 IV  3/4/25 17:30


    3/19/25 10:18


4 MG


 


 Acetaminophen  650 mg  Q6HP  PRN


 PO  3/4/25 17:30


    3/19/25 10:18


650 MG


 


 Nitroglycerin  0.4 mg  Q5MINP  PRN


 SL  3/4/25 17:30


     





 


 Atorvastatin


 Calcium  10 mg  HS


 PO  3/4/25 22:00


    3/25/25 21:35


10 MG


 


 Docusate Sodium  100 mg  BIDP  PRN


 PO  3/4/25 21:00


     





 


 Budesonide  0.5 mg  BID


 NEB  3/4/25 22:00


    3/26/25 07:17


0.5 MG


 


 Levalbuterol HCl  0.625 mg  Q6HR


 NEB  3/6/25 06:00


    3/26/25 07:17


0.625 MG


 


 Phenylephrine HCl


 80 mg/Sodium


 Chloride  250 ml @ 


 7.5 mls/hr  Q24H


 IV  3/6/25 15:00


    3/16/25 16:01


15 MLS/HR


 


 Diagnostic Test


  (Pha)  1 strip  Q6HR


 VI  3/10/25 18:00


    3/26/25 05:52


1 STRIP


 


 Insulin Human


 Regular  FOLLOW


 SLIDING


 SCALE  Q6HR


 SC  3/10/25 18:00


    3/26/25 06:06


4 UNITS


 


 Dextrose  50 ml  UD


 IV  3/10/25 15:30


     





 


 Pantoprazole


 Sodium  40 mg  BID


 IV  3/11/25 22:00


    3/26/25 10:39


40 MG


 


 Guaifenesin/


 Dextromethorphan  10 ml  Q4HP  PRN


 PO  3/12/25 17:30


    3/23/25 22:26


10 ML


 


 Hydrocortisone


 Sodium Succinate  50 mg  Q6HR


 IV  3/16/25 00:00


    3/26/25 05:52


50 MG


 


 Mupirocin  1 applic  BID


 TOP  3/18/25 22:00


    3/26/25 10:53


1 APPLIC


 


 Amiodarone HCl  200 mg  DAILY


 PO  3/19/25 10:00


    3/26/25 10:39


200 MG


 


 Levothyroxine


 Sodium  100 mcg  QAM


 PO  3/21/25 08:16


    3/26/25 06:06


100 MCG


 


 Dimethicone  80 mg  BID


 PO  3/22/25 22:00


    3/25/25 21:35


80 MG


 


 Midodrine  15 mg  DAILY@0600,1200,1800


 PO  3/23/25 18:00


    3/26/25 05:52


15 MG


 


 Polyethylene


 Glycol  17 gm  BID


 PO  3/23/25 22:00


    3/26/25 10:39


17 GM


 


 Epoetin Kiran-epbx  10,000 unit  MWF


 SC  3/24/25 10:00


    3/24/25 12:36


10,000 UNIT


 


 Lactulose  15 ml  TID


 PO  3/24/25 14:00


    3/26/25 05:52


15 ML


 


 Norepinephrine


 Bitartrate 32 mg/


 Sodium Chloride  250 ml @ 


 0.234 mls/


 hr  Q24H


 IV  3/24/25 13:15


    3/24/25 13:28


0.234 MLS/HR


 


 Norepinephrine


 Bitartrate 32 mg/


 Sodium Chloride  250 ml @ 0


 mls/hr  Q0M


 IV  3/24/25 13:15


   UNV  





 


 Nystatin  5 ml  TID


 MT  3/25/25 14:00


    3/26/25 05:52


5 ML








objective


Gen.: Patient lying in bed in no apparent distress. On room air


Head: Normocephalic, atraumatic.


Eyes: EOMI/PERRLA.


Ears: Normal hearing. Normal anatomy.


Neck/trachea: Trachea midline, supple.


Nose: Normal external anatomy.


Mouth: Moist mucous membranes.


Chest: Decreased air entry bilaterally. No wheezing or rhonchi.


Cardiovascular: Positive S1, positive S2. Regular rate and rhythm.


Abdomen: Positive bowel sounds in all 4 quadrants. Soft, non-tender, non-

distended.


: Deferred.


Rectal: Deferred.


Skin: Warm, dry. Intact.


Extremities: 2+ radial pulses bilaterally. No lower extremity edema.


Neuro: Awake, alert, oriented x3. No gross motor or sensory deficits. Cranial 

nerves II through XII intact. Gait not assessed.


laboratory and microbiology


                                Laboratory Tests


3/26/25 03:00

















Test


 3/26/25


03:00 Range/Units


 


 


Serum Glucose 184 H   mg/dL








Assessment/Plan


Impression:


Acute hypoxic respiratory failure


Shock


Atrial fibrillation, rate controlled


Lactic acidosis


End-stage renal disease, on peritoneal dialysis


Obesity





Events:


Remains on room air


Supplemental oxygen PRN





Peritoneal dialysis today


Nephrology recommendations appreciated.


Remove Lenin cath when Nephrology recommends





On pressors for hemodynamic support


Levophed 0.5 mcg/min


Titrate to keep mean arterial pressure greater than 65 mmHg


Improving pressor requirements





Head of bed elevation


Aspiration precautions





On midodrine - monitor blood pressure.





Continue steroids - Hydrocortisone 50 mg IV q.6 hours.


Incentive spirometry





On PO amiodarone





Continue Protonix BID


Clinimix for nutritional support





Monitor hemoglobin


Transfuse if less than 7.0 g/dL.





Wound care.





PD per Nephrology


Monitor renal function


Monitor electrolytes. Supplement as necessary.


Potassium supplementation





Labs and imaging reviewed.


Rest of plan as noted below.





Plan:


Supplemental oxygen PRN


Titrate to keep O2 sats above 92%.





S/p left femoral Lenin cath





Continue steroids


Incentive spirometry





Amiodarone PO


Cardiology recs appreciated.





Pressors as necessary for hemodynamic support


Titrate to keep mean arterial pressure greater than 65 mmHg.


Monitor blood pressure





Monitor renal function.


Monitor electrolytes. Supplement as necessary.


Monitor ins and outs.


Nephrology recommendations appreciated





Diet and lifestyle modifications for weight reduction


Obesity - complicates all care





DVT prophylaxis.





Prognosis: Poor given patient's multiple co-morbidities.


Condition: Critical





Rest of plan per hospitalist and other consultants.





A total of 35 minutes of critical care time was spent reviewing the patient 

record, examining the patient, making a diagnostic and therapeutic plan, 

discussing this plan with the medical personnel, following up on diagnostic 

studies and following the patient for clinical stability excluding any and all 

procedures.  At least 50% of this time was spent in direct, face-to-face 

contact.





Thank you, Dr. Platt, for allowing me to participate in this patient's care.


Further recommendations will depend on the patient's clinical course.


Please do not hesitate to contact me if you have any questions or concerns.





This medical document was created using an electronic medical record system with

Dragon computerized dictation system. Although these documentations are being 

carefully reviewed, there may still be some phonetic and typographical changes. 

The errors are purely typographical, due to imperfection on the software 

program, and do not reflect any compromise in the patient's medical care.





Dietary Evaluation Review


Comments:  


Encourage high protein diet. consider Nepro 240ml 19, 432 kcal. PO  


supplements BID


Expected Outcomes/Goals:  


maintain protein levels WNL


Plan discussed with:  Other (BALDO Oleary)


Critical Care Time(min):  35











JOSEPH SUAREZ MD             Mar 26, 2025 11:45

## 2025-03-26 NOTE — DVHPN2
Progress Note


Date Seen:  Mar 26, 2025


Medical Necessity Reason


Pt with a Central, PICC or Fol:  Yes


The following are medically ne:  Central Line, Nieves Catheter


Reason for nieves catheter:  Strict I&O





Subjective


Patient reports:  Feels worse


Review of Systems:  RESPIRATORY:Abnormal





Objective


vital signs





                                   Vital Sign








  Date Time  Temp Pulse Resp B/P (MAP) Pulse Ox O2 Delivery O2 Flow Rate FiO2


 


3/26/25 12:20  99 18  98   


 


3/26/25 12:00      Room Air* 0 21


 


3/26/25 11:00    113/75 (88)    


 


3/26/25 08:00 98.0       





 98.0       














                           Total Intake and Output   


 


 3/25/25 3/25/25 3/26/25





 15:00 23:00 07:00


 


Intake Total 2.106 ml 2362.810 ml 156.796 ml


 


Output Total  41260 ml 


 


Balance 2.106 ml -84249.190 ml 156.796 ml








medications





                               Current Medications








 Medications  Dose


 Ordered  Sig/Anthony


 Route  Start Time


 Stop Time Status Last Admin


Dose Admin


 


 Ondansetron HCl  4 mg  Q4HP  PRN


 IV  3/4/25 17:30


    3/19/25 10:18


4 MG


 


 Acetaminophen  650 mg  Q6HP  PRN


 PO  3/4/25 17:30


    3/19/25 10:18


650 MG


 


 Nitroglycerin  0.4 mg  Q5MINP  PRN


 SL  3/4/25 17:30


     





 


 Atorvastatin


 Calcium  10 mg  HS


 PO  3/4/25 22:00


    3/25/25 21:35


10 MG


 


 Docusate Sodium  100 mg  BIDP  PRN


 PO  3/4/25 21:00


     





 


 Budesonide  0.5 mg  BID


 NEB  3/4/25 22:00


    3/26/25 07:17


0.5 MG


 


 Levalbuterol HCl  0.625 mg  Q6HR


 NEB  3/6/25 06:00


    3/26/25 12:20


0.625 MG


 


 Phenylephrine HCl


 80 mg/Sodium


 Chloride  250 ml @ 


 7.5 mls/hr  Q24H


 IV  3/6/25 15:00


    3/16/25 16:01


15 MLS/HR


 


 Diagnostic Test


  (Pha)  1 strip  Q6HR


 VI  3/10/25 18:00


    3/26/25 12:28


1 STRIP


 


 Insulin Human


 Regular  FOLLOW


 SLIDING


 SCALE  Q6HR


 SC  3/10/25 18:00


    3/26/25 06:06


4 UNITS


 


 Dextrose  50 ml  UD


 IV  3/10/25 15:30


     





 


 Pantoprazole


 Sodium  40 mg  BID


 IV  3/11/25 22:00


    3/26/25 10:39


40 MG


 


 Guaifenesin/


 Dextromethorphan  10 ml  Q4HP  PRN


 PO  3/12/25 17:30


    3/23/25 22:26


10 ML


 


 Hydrocortisone


 Sodium Succinate  50 mg  Q6HR


 IV  3/16/25 00:00


    3/26/25 12:37


50 MG


 


 Mupirocin  1 applic  BID


 TOP  3/18/25 22:00


    3/26/25 10:53


1 APPLIC


 


 Amiodarone HCl  200 mg  DAILY


 PO  3/19/25 10:00


    3/26/25 10:39


200 MG


 


 Levothyroxine


 Sodium  100 mcg  QAM


 PO  3/21/25 08:16


    3/26/25 06:06


100 MCG


 


 Dimethicone  80 mg  BID


 PO  3/22/25 22:00


    3/26/25 12:28


80 MG


 


 Midodrine  15 mg  DAILY@0600,1200,1800


 PO  3/23/25 18:00


    3/26/25 12:37


15 MG


 


 Polyethylene


 Glycol  17 gm  BID


 PO  3/23/25 22:00


    3/26/25 10:39


17 GM


 


 Epoetin Kiran-epbx  10,000 unit  MWF


 SC  3/24/25 10:00


    3/24/25 12:36


10,000 UNIT


 


 Lactulose  15 ml  TID


 PO  3/24/25 14:00


    3/26/25 05:52


15 ML


 


 Norepinephrine


 Bitartrate 32 mg/


 Sodium Chloride  250 ml @ 


 0.234 mls/


 hr  Q24H


 IV  3/24/25 13:15


    3/24/25 13:28


0.234 MLS/HR


 


 Norepinephrine


 Bitartrate 32 mg/


 Sodium Chloride  250 ml @ 0


 mls/hr  Q0M


 IV  3/24/25 13:15


   UNV  





 


 Nystatin  5 ml  TID


 MT  3/25/25 14:00


    3/26/25 05:52


5 ML








Examination:  GENERAL:Abnormal, LUNGS:Abnormal, CVS:Abnormal, SKIN:Abnormal


laboratory and microbiology


                                Laboratory Tests


3/26/25 03:00

















Test


 3/26/25


03:00 Range/Units


 


 


Serum Glucose 184 H   mg/dL








                                  Microbiology








 Date/Time


Source Procedure


Growth Status





 


 3/23/25 16:00


Peritoneal Fluid Gram Stain - Final


 Resulted


 


 3/23/25 16:00


Peritoneal Fluid Body Fluid Culture - Preliminary


 Resulted


 


 3/8/25 16:30


Blood Blood Culture - Final


NO GROWTH AFTER 5 DAYS OF INCUBATION. Complete


 


 3/5/25 23:00


Nose MRSA Screen - Final


 Complete











Problem List/Assessment/Plan


Problem List/Assessment/Plan


ESRD on PD


hypervolemia- diastolic HF ? vs PD ultrafiltration failure


 shock


constipation w/ impaction


hypotension





Ultrafiltration failure - 4.25% dextrose solution did not yield negative 

balance. At this time I recommend conversion to hemodialysis . will use temp 

catheter and then tunnel HD catheter by IR


albumin for BP support and levophed


lactulose TID


strict I/O


recommend eval of cardiac function due to persistent hypotension. No clear signs

of infection to explain shock


plan discussed with daughter who is in agreement


Plan discussed with:  Patient, Daughter





Dietary Evaluation Review


Comments:  


Encourage high protein diet. consider Nepro 240ml 19, 432 kcal. PO  


supplements BID


Expected Outcomes/Goals:  


maintain protein levels WNL


Critical Care Time (mins):  33











SOPHIA SALAZAR MD           Mar 26, 2025 15:12

## 2025-03-27 NOTE — DVH
EXAM:  XR Chest, 1 View



CLINICAL INDICATION:   INTUBATION



TECHNIQUE:  Frontal view of the chest.



COMPARISON:   XY CHEST XRAY 1 VIEW on DOS: 3/27/25, XY CHEST PORTABLE on DOS: 3/25/25, XY CHEST SCOTT
BLE on DOS: 3/24/25, XY CHEST PORTABLE on DOS: 3/23/25, XY CHEST XRAY 1 VIEW on DOS: 3/20/25



FINDINGS:



 LUNGS AND PLEURAL SPACES:  Right perihilar patchy airspace disease,, new since the prior exam.  This
 may represent congestion or contusion.  No pneumothorax.



 HEART:  Unremarkable.  No cardiomegaly.



 MEDIASTINUM:  Unremarkable.  Normal mediastinal contour.



 BONES/JOINTS:  Unremarkable.  No acute fracture.



 TUBES, LINES AND DEVICES:  Right internal jugular central venous catheter tip in the superior vena c
juan pablo.  The endotracheal tube (ETT) is in satisfactory position.



 UPPER ABDOMEN:  Enteric stomach.



 OTHER FINDINGS:  .



IMPRESSION:     



 Right perihilar patchy airspace disease,, new since the prior exam.  This may represent congestion o
r contusion.



Electronically Signed by: Armando Sahni at 03/27/2025 19:21:46 PM

## 2025-03-27 NOTE — DVHPN2
Progress Note - Dictate


Date Seen:  Mar 27, 2025


Medical Necessity Reason


Pt with a Central, PICC or Fol:  Yes


The following are medically ne:  Central Line, Nieves Catheter


Reason for nieves catheter:  Strict I&O


Subjective


Patient is seen by me before noon time.  She remains clinically stable.  

Patient's nurse as well as dialysis nurse are at bedside.  Undergoing 

hemodialysis through groin catheter.  Apparently patient did not do well with 

the peritoneal dialysis.  Therefore nephrologist recommending switched to 

permanent hemodialysis.  Patient's scheduled to undergo tunneled dialysis 

catheter later this afternoon.  At present no complaints.  Plan is to take about

3 L of fluid per dialysis nurse.


vital signs





                                   Vital Sign








  Date Time  Temp Pulse Resp B/P (MAP) Pulse Ox O2 Delivery O2 Flow Rate FiO2


 


3/27/25 12:10  86 16  96   


 


3/27/25 11:15    136/55 (82)    


 


3/27/25 10:00      Room Air* 0 21


 


3/27/25 08:00 98.1       





 98.1       














                           Total Intake and Output   


 


 3/26/25 3/26/25 3/27/25





 15:00 23:00 07:00


 


Intake Total 16.872 ml 415.937 ml 377.422 ml


 


Balance 16.872 ml 415.937 ml 377.422 ml








medications





                               Current Medications








 Medications  Dose


 Ordered  Sig/Anthony


 Route  Start Time


 Stop Time Status Last Admin


Dose Admin


 


 Ondansetron HCl  4 mg  Q4HP  PRN


 IV  3/4/25 17:30


    3/19/25 10:18


4 MG


 


 Acetaminophen  650 mg  Q6HP  PRN


 PO  3/4/25 17:30


    3/19/25 10:18


650 MG


 


 Nitroglycerin  0.4 mg  Q5MINP  PRN


 SL  3/4/25 17:30


     





 


 Atorvastatin


 Calcium  10 mg  HS


 PO  3/4/25 22:00


    3/26/25 21:12


10 MG


 


 Docusate Sodium  100 mg  BIDP  PRN


 PO  3/4/25 21:00


     





 


 Budesonide  0.5 mg  BID


 NEB  3/4/25 22:00


    3/27/25 06:14


0.5 MG


 


 Levalbuterol HCl  0.625 mg  Q6HR


 NEB  3/6/25 06:00


    3/27/25 12:17


0.625 MG


 


 Phenylephrine HCl


 80 mg/Sodium


 Chloride  250 ml @ 


 7.5 mls/hr  Q24H


 IV  3/6/25 15:00


    3/16/25 16:01


15 MLS/HR


 


 Diagnostic Test


  (Pha)  1 strip  Q6HR


 VI  3/10/25 18:00


    3/27/25 12:11


1 STRIP


 


 Insulin Human


 Regular  FOLLOW


 SLIDING


 SCALE  Q6HR


 SC  3/10/25 18:00


    3/26/25 06:06


4 UNITS


 


 Dextrose  50 ml  UD


 IV  3/10/25 15:30


     





 


 Pantoprazole


 Sodium  40 mg  BID


 IV  3/11/25 22:00


    3/27/25 12:08


40 MG


 


 Guaifenesin/


 Dextromethorphan  10 ml  Q4HP  PRN


 PO  3/12/25 17:30


    3/23/25 22:26


10 ML


 


 Hydrocortisone


 Sodium Succinate  50 mg  Q6HR


 IV  3/16/25 00:00


    3/27/25 12:06


50 MG


 


 Mupirocin  1 applic  BID


 TOP  3/18/25 22:00


    3/27/25 10:00


1 APPLIC


 


 Amiodarone HCl  200 mg  DAILY


 PO  3/19/25 10:00


    3/26/25 10:39


200 MG


 


 Levothyroxine


 Sodium  100 mcg  QAM


 PO  3/21/25 08:16


    3/27/25 06:05


100 MCG


 


 Dimethicone  80 mg  BID


 PO  3/22/25 22:00


    3/26/25 21:11


80 MG


 


 Midodrine  15 mg  DAILY@0600,1200,1800


 PO  3/23/25 18:00


    3/27/25 06:05


15 MG


 


 Polyethylene


 Glycol  17 gm  BID


 PO  3/23/25 22:00


    3/26/25 21:11


17 GM


 


 Epoetin Kiran-epbx  10,000 unit  MWF


 SC  3/24/25 10:00


    3/24/25 12:36


10,000 UNIT


 


 Lactulose  15 ml  TID


 PO  3/24/25 14:00


    3/27/25 06:11


15 ML


 


 Norepinephrine


 Bitartrate 32 mg/


 Sodium Chloride  250 ml @ 


 0.234 mls/


 hr  Q24H


 IV  3/24/25 13:15


    3/27/25 00:06


3.75 MLS/HR


 


 Norepinephrine


 Bitartrate 32 mg/


 Sodium Chloride  250 ml @ 0


 mls/hr  Q0M


 IV  3/24/25 13:15


   UNV  





 


 Nystatin  5 ml  TID


 MT  3/25/25 14:00


    3/27/25 06:05


5 ML


 


 Albumin Human  100 ml @ 


 100 mls/hr  KRISTI  PRN


 IV  3/26/25 15:15


    3/27/25 11:10


100 MLS/HR








objective


Elderly female in bed without significant distress.  HEENT neck supple no JVD.  

Heart regular rate and rhythm S1-S2.  Lungs without rales or wheezing.  

Extremities positive edema


laboratory and microbiology


                                Laboratory Tests


3/27/25 04:45








3/27/25 03:31

















Test


 3/27/25


03:31 Range/Units


 


 


Serum Glucose 121 H   mg/dL








Assessment/Plan


Continue present management as she is on.  Continue pressor support to keep 

systolic blood pressure above 95.  Patient encouraged physical therapy out of 

bed to chair to prevent bedsores and due to her significant physical 

deconditioning due to prolonged hospital stay.  Otherwise continue rest of 

supportive care and treatment and further clinical management per clinical 

course.  Discussed with the nurse at bedside regarding care plan.





Dietary Evaluation Review


Comments:  


Encourage high protein diet. consider Nepro 240ml 19, 432 kcal. PO  


supplements BID


Expected Outcomes/Goals:  


maintain protein levels WNL


Plan discussed with:  Other











BEATRIS CARY MD      Mar 27, 2025 12:32

## 2025-03-27 NOTE — DVHPN2
Progress Note


Date Seen:  Mar 27, 2025


Medical Necessity Reason


Pt with a Central, PICC or Fol:  Yes


The following are medically ne:  Central Line (femoral hd catheter), Nieves 

Catheter


Reason for nieves catheter:  Strict I&O





Subjective


Other Systems:  


pt getting TD cath


pt had ecg today





Objective


vital signs





                                   Vital Sign








  Date Time  Temp Pulse Resp B/P (MAP) Pulse Ox O2 Delivery O2 Flow Rate FiO2


 


3/27/25 15:33   13  95 Nasal Cannula* 2 28


 


3/27/25 15:00 98.3 96  125/47 (73)    





 98.3       














                           Total Intake and Output   


 


 3/26/25 3/26/25 3/27/25





 15:00 23:00 07:00


 


Intake Total 16.872 ml 415.937 ml 377.422 ml


 


Balance 16.872 ml 415.937 ml 377.422 ml








medications





                               Current Medications








 Medications  Dose


 Ordered  Sig/Anthony


 Route  Start Time


 Stop Time Status Last Admin


Dose Admin


 


 Ondansetron HCl  4 mg  Q4HP  PRN


 IV  3/4/25 17:30


    3/19/25 10:18


4 MG


 


 Acetaminophen  650 mg  Q6HP  PRN


 PO  3/4/25 17:30


    3/19/25 10:18


650 MG


 


 Nitroglycerin  0.4 mg  Q5MINP  PRN


 SL  3/4/25 17:30


     





 


 Atorvastatin


 Calcium  10 mg  HS


 PO  3/4/25 22:00


    3/26/25 21:12


10 MG


 


 Docusate Sodium  100 mg  BIDP  PRN


 PO  3/4/25 21:00


     





 


 Budesonide  0.5 mg  BID


 NEB  3/4/25 22:00


    3/27/25 06:14


0.5 MG


 


 Levalbuterol HCl  0.625 mg  Q6HR


 NEB  3/6/25 06:00


    3/27/25 12:17


0.625 MG


 


 Phenylephrine HCl


 80 mg/Sodium


 Chloride  250 ml @ 


 7.5 mls/hr  Q24H


 IV  3/6/25 15:00


    3/16/25 16:01


15 MLS/HR


 


 Diagnostic Test


  (Pha)  1 strip  Q6HR


 VI  3/10/25 18:00


    3/27/25 12:11


1 STRIP


 


 Insulin Human


 Regular  FOLLOW


 SLIDING


 SCALE  Q6HR


 SC  3/10/25 18:00


    3/26/25 06:06


4 UNITS


 


 Dextrose  50 ml  UD


 IV  3/10/25 15:30


     





 


 Pantoprazole


 Sodium  40 mg  BID


 IV  3/11/25 22:00


    3/27/25 12:08


40 MG


 


 Guaifenesin/


 Dextromethorphan  10 ml  Q4HP  PRN


 PO  3/12/25 17:30


    3/23/25 22:26


10 ML


 


 Hydrocortisone


 Sodium Succinate  50 mg  Q6HR


 IV  3/16/25 00:00


    3/27/25 12:06


50 MG


 


 Mupirocin  1 applic  BID


 TOP  3/18/25 22:00


    3/27/25 10:00


1 APPLIC


 


 Amiodarone HCl  200 mg  DAILY


 PO  3/19/25 10:00


    3/27/25 12:50


200 MG


 


 Levothyroxine


 Sodium  100 mcg  QAM


 PO  3/21/25 08:16


    3/27/25 06:05


100 MCG


 


 Dimethicone  80 mg  BID


 PO  3/22/25 22:00


    3/26/25 21:11


80 MG


 


 Midodrine  15 mg  DAILY@0600,1200,1800


 PO  3/23/25 18:00


    3/27/25 12:51


15 MG


 


 Polyethylene


 Glycol  17 gm  BID


 PO  3/23/25 22:00


    3/26/25 21:11


17 GM


 


 Epoetin Kiran-epbx  10,000 unit  MWF


 SC  3/24/25 10:00


    3/24/25 12:36


10,000 UNIT


 


 Lactulose  15 ml  TID


 PO  3/24/25 14:00


    3/27/25 06:11


15 ML


 


 Norepinephrine


 Bitartrate 32 mg/


 Sodium Chloride  250 ml @ 


 0.234 mls/


 hr  Q24H


 IV  3/24/25 13:15


    3/27/25 00:06


3.75 MLS/HR


 


 Norepinephrine


 Bitartrate 32 mg/


 Sodium Chloride  250 ml @ 0


 mls/hr  Q0M


 IV  3/24/25 13:15


   UNV  





 


 Nystatin  5 ml  TID


 MT  3/25/25 14:00


    3/27/25 06:05


5 ML


 


 Albumin Human  100 ml @ 


 100 mls/hr  KRISTI  PRN


 IV  3/26/25 15:15


    3/27/25 11:10


100 MLS/HR








Examination:  GENERAL:Abnormal, HEENT:Normal, LUNGS:Abnormal, CVS:Abnormal, 

ABDOMEN:Abnormal


laboratory and microbiology


                                Laboratory Tests


3/27/25 04:45








3/27/25 03:31

















Test


 3/27/25


03:31 Range/Units


 


 


Serum Glucose 121 H   mg/dL








                                  Microbiology








 Date/Time


Source Procedure


Growth Status





 


 3/23/25 16:00


Peritoneal Fluid Gram Stain - Final


 Resulted


 


 3/23/25 16:00


Peritoneal Fluid Body Fluid Culture - Preliminary


 Resulted


 


 3/8/25 16:30


Blood Blood Culture - Final


NO GROWTH AFTER 5 DAYS OF INCUBATION. Complete


 


 3/5/25 23:00


Nose MRSA Screen - Final


 Complete











Problem List/Assessment/Plan


Problem List/Assessment/Plan


chest pain


esrd on hd


frailty


hypotension








lvef is normal on echo


lvef 70% 


on high dose midodrine


ecg rviewed, SR non specific changes


on pressors, wean as feasible 


prognosis very poor,


Plan discussed with:  Patient





Dietary Evaluation Review


Comments:  


Encourage high protein diet. consider Nepro 240ml 19, 432 kcal. PO  


supplements BID


Expected Outcomes/Goals:  


maintain protein levels WNL





Date of Service:  Mar 27, 2025


Billing Provider:  RAFAEL STONE MD


Common Visit Codes:  NOT BILLABLE











RAFAEL STONE MD              Mar 27, 2025 15:52

## 2025-03-27 NOTE — DVH
PROCEDURE: TUNNELED CENTRAL VENOUS CATHETER PLACEMENT USING FLUOROSCOPY AND ULTRASOUND



HISTORY: HD CATH PL



DOCUMENTATION: Informed consent was obtained and a procedural  time out  was performed. 



SEDATION: Moderate sedation was utilized during the procedure. The patient received benzodiazepines a
nd opioids, the dosing of which was documented in the patient s permanent medical record. Pre-sedatio
n history and evaluation revealed no contraindications to sedation. The patient s level of consciousn
ess and physiologic status was monitored continuously by the physician and nursing staff throughout t
he procedure. Total intra-service moderate sedation time was 30 minutes.



FLUORO: 1 minutes, 2  mGy, 



TECHNIQUE: The skin over the RIGHT internal jugular vein and chest was sterilely prepped, draped and 
anesthetized with 1% lidocaine with epinephrine. The vein was accessed with a 21-gauge needle under u
ltrasound guidance with an image archived in the PACS. A guidewire was then passed into the central v
eins under fluoroscopy. The subcutaneous tunnel was anesthetized with 1% lidocaine and the catheter w
as tunneled to the venous entry site, cut to the appropriate length, and inserted through a peel away
 sheath. A final radiograph was obtained, the catheter was flushed and secured in place, and sterile 
dressings were applied.



Procedural physician complied with all CLIP criteria, including preprocedural hand hygiene and use of
 maximum sterile barriers including hat, gown, sterile gloves, mask, and head to toe drape. The neck 
and chest were prepped with chlorhexidine solution and draped in the usual sterile fashion. The prep 
solution was allowed to dry prior to puncture.



FINDINGS: Ultrasound demonstrates a patent RIGHT internal jugular vein. The tip of the catheter was p
laced near the cavoatrial junction. No complications are identified.



IMPRESSION: 



SUCCESSFUL 14.5 x 23 cm Vincentian DUAL-LUMEN POWER INJECTABLE TUNNELED CENTRAL VENOUS CATHETER PLACEMENT
. THE CATHETER IS READY FOR IMMEDIATE USE.



Electronically Signed by: Abdirahman Freeman at 03/27/2025 14:50:10 PM

## 2025-03-27 NOTE — DVH
PROCEDURE: TUNNELED CENTRAL VENOUS CATHETER PLACEMENT USING FLUOROSCOPY AND ULTRASOUND



HISTORY: HD CATH PL



DOCUMENTATION: Informed consent was obtained and a procedural  time out  was performed. 



SEDATION: Moderate sedation was utilized during the procedure. The patient received benzodiazepines a
nd opioids, the dosing of which was documented in the patient s permanent medical record. Pre-sedatio
n history and evaluation revealed no contraindications to sedation. The patient s level of consciousn
ess and physiologic status was monitored continuously by the physician and nursing staff throughout t
he procedure. Total intra-service moderate sedation time was 30 minutes.



FLUORO: 1 minutes, 2  mGy, 



TECHNIQUE: The skin over the RIGHT internal jugular vein and chest was sterilely prepped, draped and 
anesthetized with 1% lidocaine with epinephrine. The vein was accessed with a 21-gauge needle under u
ltrasound guidance with an image archived in the PACS. A guidewire was then passed into the central v
eins under fluoroscopy. The subcutaneous tunnel was anesthetized with 1% lidocaine and the catheter w
as tunneled to the venous entry site, cut to the appropriate length, and inserted through a peel away
 sheath. A final radiograph was obtained, the catheter was flushed and secured in place, and sterile 
dressings were applied.



Procedural physician complied with all CLIP criteria, including preprocedural hand hygiene and use of
 maximum sterile barriers including hat, gown, sterile gloves, mask, and head to toe drape. The neck 
and chest were prepped with chlorhexidine solution and draped in the usual sterile fashion. The prep 
solution was allowed to dry prior to puncture.



FINDINGS: Ultrasound demonstrates a patent RIGHT internal jugular vein. The tip of the catheter was p
laced near the cavoatrial junction. No complications are identified.



IMPRESSION: 



SUCCESSFUL 14.5 x 23 cm Moroccan DUAL-LUMEN POWER INJECTABLE TUNNELED CENTRAL VENOUS CATHETER PLACEMENT
. THE CATHETER IS READY FOR IMMEDIATE USE.



Electronically Signed by: Abdirahman Freeman at 03/27/2025 14:50:10 PM

## 2025-03-27 NOTE — DVHNC2
Procedure


-


Procedure: Endotracheal Intubation


INDICATION: Acute respiratory failure, agonal breathing


Physician: Joseph Charlton MD


Assisted RN RT Ale Thapa





CONSENT: Emergent procedure. Implied. 





Patient medications and allergies reviewed.  





Patient identification and proposed procedure were verified prior to the 

procedure by the physician, and a nurse in the patient's room.  The heart rate, 

respiratory rate, oxygen saturations, blood pressure, adequacy of pulmonary v

entilation, and response to care were monitored throughout the procedure.  The 

physical status of the patient was reassessed after the procedure.





PROCEDURE SUMMARY: 


A time out was performed. My hands were washed immediately prior to the 

procedure. I wore a surgical cap, mask with protective eyewear, gown and gloves 

throughout the procedure. The patient was placed on a cardiac monitor including 

continuous pulse oximetry. 





The patient received 16 mg Etomidate and 50 mg rocuronium for induction. Cricoid

pressure was maintained from time induction agent was given to time of cuff 

balloon inflation. Using a MAC 4 GlideoScope and a size 8.0 endotracheal tube 

with stylet, the patient was intubated on the 1 attempt. The stylet was removed 

and cuff balloon was inflated. Appropriate endotracheal tube position was 

confirmed by direct visualization of vocal cord passage, fogging of the tube, 

CO2 colorimetric indicator and symmetric breath sounds. The tube was secured at 

23 cm at the lips. Post intubation chest x-ray demonstrates the ETT above the 

rubén. It was retracted to 19 cm at lip.





CPT Code: 64800











JOSEPH CHARLTON MD             Mar 27, 2025 18:23

## 2025-03-27 NOTE — DVH
PROCEDURE: TUNNELED CENTRAL VENOUS CATHETER PLACEMENT USING FLUOROSCOPY AND ULTRASOUND



HISTORY: HD CATH PL



DOCUMENTATION: Informed consent was obtained and a procedural  time out  was performed. 



SEDATION: Moderate sedation was utilized during the procedure. The patient received benzodiazepines a
nd opioids, the dosing of which was documented in the patient s permanent medical record. Pre-sedatio
n history and evaluation revealed no contraindications to sedation. The patient s level of consciousn
ess and physiologic status was monitored continuously by the physician and nursing staff throughout t
he procedure. Total intra-service moderate sedation time was 30 minutes.



FLUORO: 1 minutes, 2  mGy, 



TECHNIQUE: The skin over the RIGHT internal jugular vein and chest was sterilely prepped, draped and 
anesthetized with 1% lidocaine with epinephrine. The vein was accessed with a 21-gauge needle under u
ltrasound guidance with an image archived in the PACS. A guidewire was then passed into the central v
eins under fluoroscopy. The subcutaneous tunnel was anesthetized with 1% lidocaine and the catheter w
as tunneled to the venous entry site, cut to the appropriate length, and inserted through a peel away
 sheath. A final radiograph was obtained, the catheter was flushed and secured in place, and sterile 
dressings were applied.



Procedural physician complied with all CLIP criteria, including preprocedural hand hygiene and use of
 maximum sterile barriers including hat, gown, sterile gloves, mask, and head to toe drape. The neck 
and chest were prepped with chlorhexidine solution and draped in the usual sterile fashion. The prep 
solution was allowed to dry prior to puncture.



FINDINGS: Ultrasound demonstrates a patent RIGHT internal jugular vein. The tip of the catheter was p
laced near the cavoatrial junction. No complications are identified.



IMPRESSION: 



SUCCESSFUL 14.5 x 23 cm Turkish DUAL-LUMEN POWER INJECTABLE TUNNELED CENTRAL VENOUS CATHETER PLACEMENT
. THE CATHETER IS READY FOR IMMEDIATE USE.



Electronically Signed by: Abdirahman Freeman at 03/27/2025 14:50:10 PM

## 2025-03-27 NOTE — DVHPN2
Progress Note - Dictate


Date Seen:  Mar 27, 2025


Medical Necessity Reason


Pt with a Central, PICC or Fol:  Yes


The following are medically ne:  Central Line, Nieves Catheter


Reason for nieves catheter:  Strict I&O


Subjective


No new acute complaints noted at this time.


Patient is undergoing peritoneal dialysis, daughter at bedside.





Hemoglobin is low.





WBC trending up. Creatinine also elevated.


vital signs





                                   Vital Sign








  Date Time  Temp Pulse Resp B/P (MAP) Pulse Ox O2 Delivery O2 Flow Rate FiO2


 


3/27/25 06:45  95 14 131/44 (73) 98   


 


3/27/25 06:15      Nasal Cannula* 2 28


 


3/27/25 04:00 98.2       





 98.2       














                           Total Intake and Output   


 


 3/26/25 3/26/25 3/27/25





 15:00 23:00 07:00


 


Intake Total 16.872 ml 415.937 ml 374.844 ml


 


Balance 16.872 ml 415.937 ml 374.844 ml








medications





                               Current Medications








 Medications  Dose


 Ordered  Sig/Anthony


 Route  Start Time


 Stop Time Status Last Admin


Dose Admin


 


 Ondansetron HCl  4 mg  Q4HP  PRN


 IV  3/4/25 17:30


    3/19/25 10:18


4 MG


 


 Acetaminophen  650 mg  Q6HP  PRN


 PO  3/4/25 17:30


    3/19/25 10:18


650 MG


 


 Nitroglycerin  0.4 mg  Q5MINP  PRN


 SL  3/4/25 17:30


     





 


 Atorvastatin


 Calcium  10 mg  HS


 PO  3/4/25 22:00


    3/26/25 21:12


10 MG


 


 Docusate Sodium  100 mg  BIDP  PRN


 PO  3/4/25 21:00


     





 


 Budesonide  0.5 mg  BID


 NEB  3/4/25 22:00


    3/27/25 06:14


0.5 MG


 


 Levalbuterol HCl  0.625 mg  Q6HR


 NEB  3/6/25 06:00


    3/27/25 06:14


0.625 MG


 


 Phenylephrine HCl


 80 mg/Sodium


 Chloride  250 ml @ 


 7.5 mls/hr  Q24H


 IV  3/6/25 15:00


    3/16/25 16:01


15 MLS/HR


 


 Diagnostic Test


  (Pha)  1 strip  Q6HR


 VI  3/10/25 18:00


    3/27/25 05:35


1 STRIP


 


 Insulin Human


 Regular  FOLLOW


 SLIDING


 SCALE  Q6HR


 SC  3/10/25 18:00


    3/26/25 06:06


4 UNITS


 


 Dextrose  50 ml  UD


 IV  3/10/25 15:30


     





 


 Pantoprazole


 Sodium  40 mg  BID


 IV  3/11/25 22:00


    3/26/25 21:18


40 MG


 


 Guaifenesin/


 Dextromethorphan  10 ml  Q4HP  PRN


 PO  3/12/25 17:30


    3/23/25 22:26


10 ML


 


 Hydrocortisone


 Sodium Succinate  50 mg  Q6HR


 IV  3/16/25 00:00


    3/27/25 06:05


50 MG


 


 Mupirocin  1 applic  BID


 TOP  3/18/25 22:00


    3/26/25 21:12


1 APPLIC


 


 Amiodarone HCl  200 mg  DAILY


 PO  3/19/25 10:00


    3/26/25 10:39


200 MG


 


 Levothyroxine


 Sodium  100 mcg  QAM


 PO  3/21/25 08:16


    3/27/25 06:05


100 MCG


 


 Dimethicone  80 mg  BID


 PO  3/22/25 22:00


    3/26/25 21:11


80 MG


 


 Midodrine  15 mg  DAILY@0600,1200,1800


 PO  3/23/25 18:00


    3/27/25 06:05


15 MG


 


 Polyethylene


 Glycol  17 gm  BID


 PO  3/23/25 22:00


    3/26/25 21:11


17 GM


 


 Epoetin Kiran-epbx  10,000 unit  MWF


 SC  3/24/25 10:00


    3/24/25 12:36


10,000 UNIT


 


 Lactulose  15 ml  TID


 PO  3/24/25 14:00


    3/27/25 06:11


15 ML


 


 Norepinephrine


 Bitartrate 32 mg/


 Sodium Chloride  250 ml @ 


 0.234 mls/


 hr  Q24H


 IV  3/24/25 13:15


    3/27/25 00:06


3.75 MLS/HR


 


 Norepinephrine


 Bitartrate 32 mg/


 Sodium Chloride  250 ml @ 0


 mls/hr  Q0M


 IV  3/24/25 13:15


   UNV  





 


 Nystatin  5 ml  TID


 MT  3/25/25 14:00


    3/27/25 06:05


5 ML


 


 Albumin Human  100 ml @ 


 100 mls/hr  KRISTI  PRN


 IV  3/26/25 15:15


     











objective


General Appearance:  Alert, , mild distress


HEENT:  Atraumatic, PERRLA, EOMI


Respiratory:  Clear to auscultation, Normal air movement


Cardiovascular:  Normal S1, Normal S2, Other (afib)


Abdominal:  non tender. 


Extremities:  No clubbing, No cyanosis, Normal pulses ,edema +


Skin:  No rashes, No breakdown


Neuro:  grossly intact


laboratory and microbiology


                                Laboratory Tests


3/27/25 04:45








3/27/25 03:31

















Test


 3/27/25


03:31 Range/Units


 


 


Serum Glucose 121 H   mg/dL








Assessment/Plan


Patient is a 82-year-old female presents to the hospital with:





Hypotension/Shock


Lactic Acidosis 


Afib uncontrolled


Chest pain


Hyperkalemia


ESRD on PD


Chronic abdominal pain


Diarrhea 





Recommendations:


Patient is on 2 pressors. 





Stopped Antibiotics


Pulm/ crit care on board


follow blood culture: no growth


no clear source of infection





03/23, Fluid culture showed no growth





03/25, Chest x-ray showed Improved aeration in the lung fields.





hypotension is likely multifactorial cardiac due to Afib with rvr vs medication 

induced/ acidosis





s/p  Peritoneal fluid analysis: cell count not qualifying for peritonitis. 

cultures are also negative 








Antibiotic status:


Meropenem IV [Restarted on 03/10 - 03/21]


Vancomycin IV [Started on 03/05 - 03/08]





03/19, Vancomycin Random is 15.0





03/04, Abdomen Pelvis CT showed No acute intra-abdominal finding. Isodense 

lesion of the right mid kidney measuring 1.2 cm, attention on follow-up is 

recommended.  


Compression fracture of L2 and possibly superior endplate of L1 with mild 

posterior extension resulting in mild spinal canal stenosis, age-indeterminate  





03/04, Blood culture showed no growth





03/06, Fluid culture preliminary showed no growth





03/05, MRSA screening negative





critical care time 35 minutes which includes assessment, coordinating plan with 

nurse and consultants. 





prognosis poor


plan discussed with RN





Thank you for consult.





Dietary Evaluation Review


Comments:  


Encourage high protein diet. consider Nepro 240ml 19, 432 kcal. PO  


supplements BID


Expected Outcomes/Goals:  


maintain protein levels WNL











GELA ELLISON MD            Mar 27, 2025 09:33

## 2025-03-27 NOTE — DVHPN2
Progress Note


Date Seen:  Mar 27, 2025


Medical Necessity Reason


Pt with a Central, PICC or Fol:  Yes


The following are medically ne:  Central Line (femoral hd catheter), Nieves 

Catheter


Reason for nieves catheter:  Strict I&O





Subjective


Review of Systems:  RESPIRATORY:Abnormal





Objective


vital signs





                                   Vital Sign








  Date Time  Temp Pulse Resp B/P (MAP) Pulse Ox O2 Delivery O2 Flow Rate FiO2


 


3/27/25 12:10  86 16  96   


 


3/27/25 11:15    136/55 (82)    


 


3/27/25 10:00      Room Air* 0 21


 


3/27/25 08:00 98.1       





 98.1       














                           Total Intake and Output   


 


 3/26/25 3/26/25 3/27/25





 14:59 22:59 06:59


 


Intake Total 16.872 ml 415.468 ml 377.422 ml


 


Balance 16.872 ml 415.468 ml 377.422 ml








medications





                               Current Medications








 Medications  Dose


 Ordered  Sig/Anthony


 Route  Start Time


 Stop Time Status Last Admin


Dose Admin


 


 Ondansetron HCl  4 mg  Q4HP  PRN


 IV  3/4/25 17:30


    3/19/25 10:18


4 MG


 


 Acetaminophen  650 mg  Q6HP  PRN


 PO  3/4/25 17:30


    3/19/25 10:18


650 MG


 


 Nitroglycerin  0.4 mg  Q5MINP  PRN


 SL  3/4/25 17:30


     





 


 Atorvastatin


 Calcium  10 mg  HS


 PO  3/4/25 22:00


    3/26/25 21:12


10 MG


 


 Docusate Sodium  100 mg  BIDP  PRN


 PO  3/4/25 21:00


     





 


 Budesonide  0.5 mg  BID


 NEB  3/4/25 22:00


    3/27/25 06:14


0.5 MG


 


 Levalbuterol HCl  0.625 mg  Q6HR


 NEB  3/6/25 06:00


    3/27/25 12:17


0.625 MG


 


 Phenylephrine HCl


 80 mg/Sodium


 Chloride  250 ml @ 


 7.5 mls/hr  Q24H


 IV  3/6/25 15:00


    3/16/25 16:01


15 MLS/HR


 


 Diagnostic Test


  (Pha)  1 strip  Q6HR


 VI  3/10/25 18:00


    3/27/25 12:11


1 STRIP


 


 Insulin Human


 Regular  FOLLOW


 SLIDING


 SCALE  Q6HR


 SC  3/10/25 18:00


    3/26/25 06:06


4 UNITS


 


 Dextrose  50 ml  UD


 IV  3/10/25 15:30


     





 


 Pantoprazole


 Sodium  40 mg  BID


 IV  3/11/25 22:00


    3/27/25 12:08


40 MG


 


 Guaifenesin/


 Dextromethorphan  10 ml  Q4HP  PRN


 PO  3/12/25 17:30


    3/23/25 22:26


10 ML


 


 Hydrocortisone


 Sodium Succinate  50 mg  Q6HR


 IV  3/16/25 00:00


    3/27/25 12:06


50 MG


 


 Mupirocin  1 applic  BID


 TOP  3/18/25 22:00


    3/27/25 10:00


1 APPLIC


 


 Amiodarone HCl  200 mg  DAILY


 PO  3/19/25 10:00


    3/27/25 12:50


200 MG


 


 Levothyroxine


 Sodium  100 mcg  QAM


 PO  3/21/25 08:16


    3/27/25 06:05


100 MCG


 


 Dimethicone  80 mg  BID


 PO  3/22/25 22:00


    3/26/25 21:11


80 MG


 


 Midodrine  15 mg  DAILY@0600,1200,1800


 PO  3/23/25 18:00


    3/27/25 12:51


15 MG


 


 Polyethylene


 Glycol  17 gm  BID


 PO  3/23/25 22:00


    3/26/25 21:11


17 GM


 


 Epoetin Kiran-epbx  10,000 unit  MWF


 SC  3/24/25 10:00


    3/24/25 12:36


10,000 UNIT


 


 Lactulose  15 ml  TID


 PO  3/24/25 14:00


    3/27/25 06:11


15 ML


 


 Norepinephrine


 Bitartrate 32 mg/


 Sodium Chloride  250 ml @ 


 0.234 mls/


 hr  Q24H


 IV  3/24/25 13:15


    3/27/25 00:06


3.75 MLS/HR


 


 Norepinephrine


 Bitartrate 32 mg/


 Sodium Chloride  250 ml @ 0


 mls/hr  Q0M


 IV  3/24/25 13:15


   UNV  





 


 Nystatin  5 ml  TID


 MT  3/25/25 14:00


    3/27/25 06:05


5 ML


 


 Albumin Human  100 ml @ 


 100 mls/hr  KRISTI  PRN


 IV  3/26/25 15:15


    3/27/25 11:10


100 MLS/HR








Examination:  GENERAL:Abnormal, ABDOMEN:Abnormal


laboratory and microbiology


                                Laboratory Tests


3/27/25 04:45








3/27/25 03:31

















Test


 3/27/25


03:31 Range/Units


 


 


Serum Glucose 121 H   mg/dL








                                  Microbiology








 Date/Time


Source Procedure


Growth Status





 


 3/23/25 16:00


Peritoneal Fluid Gram Stain - Final


 Resulted


 


 3/23/25 16:00


Peritoneal Fluid Body Fluid Culture - Preliminary


 Resulted


 


 3/8/25 16:30


Blood Blood Culture - Final


NO GROWTH AFTER 5 DAYS OF INCUBATION. Complete


 


 3/5/25 23:00


Nose MRSA Screen - Final


 Complete











Problem List/Assessment/Plan


Problem List/Assessment/Plan


ESRD on PD


hypervolemia- diastolic HF ? 


PD ultrafiltration failure


shock


constipation w/ impaction


hypotension








HD with temp cath failed last night after 25mins, successful today 3.5L PUF


went for tunnel HD catheter


repeated HD treatments until euvolemic


albumin for BP support and levophed


lactulose BID


strict I/O


recommend eval of cardiac function due to persistent hypotension. No clear signs

of infection to explain shock


HD chairtime requested from . for outpatient dialysis when stable


plan discussed with daughter who is in agreement


Plan discussed with:  Patient, Daughter





My Orders


My Orders





                          Orders - SOPHIA SALAZAR MD








Procedure Category Date Status





  Time 


 


Dialysis Nursing DARLYN 3/26/25 In Process





Message  15:13 


 


Document Fluid Input DARLYN 3/26/25 In Process





And Outpu  15:13 


 


Albumin 25% (Albutein) PHA 3/26/25 In Process





  15:15 


 


Hemodialysis Orders ORDERS 3/26/25 Transmitted





  15:16 


 


* Radiologist Consult CONS 3/26/25 Transmitted





  16:30 


 


*  CONS 3/26/25 Transmitted





Consult  16:30 


 


Hemodialysis Orders ORDERS 3/27/25 Transmitted





  08:15 











Dietary Evaluation Review


Comments:  


Encourage high protein diet. consider Nepro 240ml 19, 432 kcal. PO  


supplements BID


Expected Outcomes/Goals:  


maintain protein levels WNL











SOPHIA SALAZAR MD           Mar 27, 2025 13:45

## 2025-03-27 NOTE — DVH
EXAM: XY CHEST XRAY 1 VIEW



TECHNIQUE:   Single frontal chest radiograph



CLINICAL HISTORY: sob



COMPARISON: XY CHEST PORTABLE on DOS: 3/25/25, XY CHEST PORTABLE on DOS: 3/24/25, XY CHEST PORTABLE o
n DOS: 3/23/25



Findings/Impression:



Frontal chest radiograph demonstrates no acute osseous or superficial soft tissue abnormalities.



Tunneled right-sided HD catheter terminates near the inferior cavoatrial junction.  Right-sided IJ ca
theter terminates in the right atrium.



The trachea is midline. The cardiac silhouette and mediastinum are within normal limits.



Pulmonary vascular congestion.



Trace left pleural effusion and/or atelectasis.



No pneumothorax.



Electronically Signed by: Deloris Lee at 03/27/2025 17:15:00 PM

## 2025-03-27 NOTE — DVH
INDICATION: INTUBATION,NG PLACEMENT



TECHNIQUE: 



  Single frontal chest radiograph



COMPARISON: XY CHEST XRAY 1 VIEW on DOS: 3/27/25, XY CHEST PORTABLE on DOS: 3/25/25, XY CHEST PORTABL
E on DOS: 3/24/25, XY CHEST PORTABLE on DOS: 3/23/25, XY CHEST XRAY 1 VIEW on DOS: 3/20/25



FINDINGS: 



ETT 5 mm from the rubén.  Retract by 3 cm. Tunneled hemodialysis catheter tip in the cavoatrial junc
tion.  Nasogastric tube tip in the stomach. The heart and mediastinal contours are grossly unremarkab
le.  Small left pleural effusion.. The lungs are clear.    The bony structures of the chest are intac
t without fracture.



IMPRESSION: 



1. Retract endotracheal tube by 3 cm. No pneumothorax status post tunneled hemodialysis catheter plac
ement catheter in good position.



Electronically Signed by: Abdirahman Freeman at 03/27/2025 19:32:30 PM

## 2025-03-27 NOTE — DVHPN2
Progress Note - Dictate


Date Seen:  Mar 27, 2025


Medical Necessity Reason


Pt with a Central, PICC or Fol:  Yes


The following are medically ne:  Central Line (femoral hd catheter), Nieves 

Catheter


Reason for nieves catheter:  Strict I&O


Subjective


Patient seen and examined at bedside.


Sedated, intubated on mechanical ventilator.


Overnight events reviewed.


vital signs





                                   Vital Sign








  Date Time  Temp Pulse Resp B/P (MAP) Pulse Ox O2 Delivery O2 Flow Rate FiO2


 


3/27/25 21:59  0 0   Mechanical Ventilator  





  0      


 


3/27/25 19:36    74/23 (40)    100


 


3/27/25 15:33     95  2 


 


3/27/25 15:00 98.3       





 98.3       














                           Total Intake and Output   


 


 3/26/25 3/26/25 3/27/25





 15:00 23:00 07:00


 


Intake Total 16.872 ml 415.937 ml 377.422 ml


 


Balance 16.872 ml 415.937 ml 377.422 ml








medications





                               Current Medications








 Medications  Dose


 Ordered  Sig/Anthony


 Route  Start Time


 Stop Time Status Last Admin


Dose Admin


 


 Ondansetron HCl  4 mg  Q4HP  PRN


 IV  3/4/25 17:30


    3/19/25 10:18


4 MG


 


 Acetaminophen  650 mg  Q6HP  PRN


 PO  3/4/25 17:30


    3/19/25 10:18


650 MG


 


 Nitroglycerin  0.4 mg  Q5MINP  PRN


 SL  3/4/25 17:30


     





 


 Atorvastatin


 Calcium  10 mg  HS


 PO  3/4/25 22:00


    3/26/25 21:12


10 MG


 


 Docusate Sodium  100 mg  BIDP  PRN


 PO  3/4/25 21:00


     





 


 Budesonide  0.5 mg  BID


 NEB  3/4/25 22:00


    3/27/25 06:14


0.5 MG


 


 Levalbuterol HCl  0.625 mg  Q6HR


 NEB  3/6/25 06:00


    3/27/25 12:17


0.625 MG


 


 Phenylephrine HCl


 80 mg/Sodium


 Chloride  250 ml @ 


 7.5 mls/hr  Q24H


 IV  3/6/25 15:00


    3/16/25 16:01


15 MLS/HR


 


 Diagnostic Test


  (Pha)  1 strip  Q6HR


 VI  3/10/25 18:00


    3/27/25 12:11


1 STRIP


 


 Insulin Human


 Regular  FOLLOW


 SLIDING


 SCALE  Q6HR


 SC  3/10/25 18:00


    3/26/25 06:06


4 UNITS


 


 Dextrose  50 ml  UD


 IV  3/10/25 15:30


     





 


 Pantoprazole


 Sodium  40 mg  BID


 IV  3/11/25 22:00


    3/27/25 12:08


40 MG


 


 Mupirocin  1 applic  BID


 TOP  3/18/25 22:00


    3/27/25 10:00


1 APPLIC


 


 Amiodarone HCl  200 mg  DAILY


 PO  3/19/25 10:00


    3/27/25 12:50


200 MG


 


 Levothyroxine


 Sodium  100 mcg  QAM


 PO  3/21/25 08:16


    3/27/25 06:05


100 MCG


 


 Dimethicone  80 mg  BID


 PO  3/22/25 22:00


    3/26/25 21:11


80 MG


 


 Midodrine  15 mg  DAILY@0600,1200,1800


 PO  3/23/25 18:00


    3/27/25 12:51


15 MG


 


 Polyethylene


 Glycol  17 gm  BID


 PO  3/23/25 22:00


    3/26/25 21:11


17 GM


 


 Epoetin Kiran-epbx  10,000 unit  MWF


 SC  3/24/25 10:00


    3/24/25 12:36


10,000 UNIT


 


 Lactulose  15 ml  TID


 PO  3/24/25 14:00


    3/27/25 06:11


15 ML


 


 Norepinephrine


 Bitartrate 32 mg/


 Sodium Chloride  250 ml @ 


 0.234 mls/


 hr  Q24H


 IV  3/24/25 13:15


    3/27/25 00:06


3.75 MLS/HR


 


 Norepinephrine


 Bitartrate 32 mg/


 Sodium Chloride  250 ml @ 0


 mls/hr  Q0M


 IV  3/24/25 13:15


   UNV  





 


 Nystatin  5 ml  TID


 MT  3/25/25 14:00


    3/27/25 06:05


5 ML


 


 Albumin Human  100 ml @ 


 100 mls/hr  KRISTI  PRN


 IV  3/26/25 15:15


    3/27/25 11:10


100 MLS/HR


 


 Vasopressin 20


 units/Sodium


 Chloride  100 ml @ 9


 mls/hr  Q11H7M


 IV  3/27/25 16:45


   Cancel  





 


 Hydrocortisone


 Sodium Succinate  100 mg  Q6HR


 IV  3/27/25 18:00


    3/27/25 18:00


100 MG


 


 Vasopressin 40


 units/Dextrose  200 ml @ 


 60 mls/hr  Q3H20M


 IV  3/27/25 17:15


   Cancel  





 


 Phenylephrine HCl


 80 mg/Sodium


 Chloride  250 ml @ 


 7.5 mls/hr  Q24H


 IV  3/27/25 17:15


     





 


 Norepinephrine


 Bitartrate 32 mg/


 Sodium Chloride  250 ml @ 


 0.938 mls/


 hr  Q24H


 IV  3/27/25 17:15


     





 


 Epinephrine HCl


 16 mg/Dextrose  250 ml @ 


 1.875 mls/


 hr  Q24H


 IV  3/27/25 17:15


     





 


 Dopamine HCl/


 Dextrose  250 ml @ 


 7.734 mls/


 hr  Q24H


 IV  3/27/25 19:15


     





 


 Vasopressin 20


 units/Sodium


 Chloride  100 ml @ 9


 mls/hr  Q11H7M


 IV  3/27/25 19:15


     











objective


Gen.: Patient lying in bed in medical ICU. Sedated, intubated on mechanical 

ventilator.


Head: Normocephalic, atraumatic.


Eyes: PERRLA.


Ears: Normal external anatomy.


Throat: Endotracheal tube and orogastric tube in place.


Neck: Supple, trachea midline.


Chest: Transmitted breath sounds bilaterally. Decreased air entry bilaterally. 

No wheezing. Bibasilar crackles.


Cardiovascular: Positive S1, positive S2. Regular rate and rhythm.


Abdomen: Positive bowel sounds in all 4 quadrants. Soft, nontender, 

nondistended.


: Nieves in place. Normal external genitalia.


Rectal: Deferred.


Skin: Warm, dry. Intact.


Extremities: 2+ radial pulses bilaterally. No lower extremity edema.


Neuro: Sedated.


laboratory and microbiology


                                Laboratory Tests


3/27/25 19:00








3/27/25 16:36

















Test


 3/27/25


19:00 Range/Units


 


 


Serum Glucose 187 H   mg/dL








Assessment/Plan


Impression:


Acute hypoxic respiratory failure


Shock


Atrial fibrillation, rate controlled


Lactic acidosis


End-stage renal disease, on peritoneal dialysis


Obesity





Events:


Patient noted to be altered, hypotensive.


S/p tunneled catheter placement.





Requiring multiple pressors for hemodynamic support


On Levophed 30 mcg/min, Iam-Synephrine 180 mcg/min, vasopressin 0.04 units/min


Titrate to keep mean arterial pressure greater than 65 mmHg





STAT 250 mL normal saline bolus given.





Intubated, placed on mechanical ventilator


On AC mode; RR 20, , PEEP 5, FiO2 100%


Sedate for vent synchrony


Fentanyl, Precedex





S/p right femoral arterial line placement


See separate procedure note for details.





Head of bed elevation


Aspiration precautions





On midodrine


Continue steroids - Hydrocortisone 100 mg IV q.8 hours.


Incentive spirometry





On PO amiodarone





Continue Protonix BID


Clinimix for nutritional support





Monitor hemoglobin


Transfuse if less than 7.0 g/dL.





Wound care.





HD/PD per Nephrology


Monitor renal function


Monitor electrolytes. Supplement as necessary.





Labs and imaging reviewed.


Rest of plan as noted below.





Plan:


s/p intubation on mechanical ventilator.





On AC mode; RR 20, , PEEP 5, FiO2 100%


Titrate FIO2 to keep O2 saturation above 90%.


VAP bundle.


Daily ABG and CXR while intubated


Sedate for ventilator synchrony





S/p left femoral Lenin cath





Continue steroids


Incentive spirometry





Amiodarone PO


Cardiology recs appreciated.





Pressors as necessary for hemodynamic support


Titrate to keep mean arterial pressure greater than 65 mmHg.


Monitor blood pressure





Monitor renal function.


Monitor electrolytes. Supplement as necessary.


Monitor ins and outs.


Nephrology recommendations appreciated





Diet and lifestyle modifications for weight reduction


Obesity - complicates all care





DVT prophylaxis.





Prognosis: Poor given patient's multiple co-morbidities.


Condition: Critical





Rest of plan per hospitalist and other consultants.





A total of 35 minutes of critical care time was spent reviewing the patient 

record, examining the patient, making a diagnostic and therapeutic plan, 

discussing this plan with the medical personnel, following up on diagnostic 

studies and following the patient for clinical stability excluding any and all 

procedures.  At least 50% of this time was spent in direct, face-to-face 

contact.





Thank you, Dr. Platt, for allowing me to participate in this patient's care.


Further recommendations will depend on the patient's clinical course.


Please do not hesitate to contact me if you have any questions or concerns.





This medical document was created using an electronic medical record system with

Dragon computerized dictation system. Although these documentations are being 

carefully reviewed, there may still be some phonetic and typographical changes. 

The errors are purely typographical, due to imperfection on the software 

program, and do not reflect any compromise in the patient's medical care.





Dietary Evaluation Review


Comments:  


Encourage high protein diet. consider Nepro 240ml 19, 432 kcal. PO  


supplements BID


Expected Outcomes/Goals:  


maintain protein levels WNL


Plan discussed with:  Other (BALDO Thapa)


Critical Care Time(min):  35











JOSEPH SUAREZ MD             Mar 27, 2025 22:37

## 2025-03-27 NOTE — DVHPN2
Progress Note - Dictate


Date Seen:  Mar 27, 2025 (Time of visit 3 pm)


Medical Necessity Reason


Pt with a Central, PICC or Fol:  Yes


The following are medically ne:  Central Line (femoral hd catheter), Nieves 

Catheter


Reason for nieves catheter:  Strict I&O


Subjective


No GI bleeding


Hemoglobin did drop down to 7.3;


Patient underwent dialysis catheter placement today


Patient is on low-dose Levophed during dialysis


vital signs





                                   Vital Sign








  Date Time  Temp Pulse Resp B/P (MAP) Pulse Ox O2 Delivery O2 Flow Rate FiO2


 


3/27/25 20:00  46    Mechanical Ventilator  


 


3/27/25 19:36   20 74/23 (40)    100


 


3/27/25 15:33     95  2 


 


3/27/25 15:00 98.3       





 98.3       














                           Total Intake and Output   


 


 3/26/25 3/26/25 3/27/25





 15:00 23:00 07:00


 


Intake Total 16.872 ml 415.937 ml 377.422 ml


 


Balance 16.872 ml 415.937 ml 377.422 ml








medications





                               Current Medications








 Medications  Dose


 Ordered  Sig/Anthony


 Route  Start Time


 Stop Time Status Last Admin


Dose Admin


 


 Ondansetron HCl  4 mg  Q4HP  PRN


 IV  3/4/25 17:30


    3/19/25 10:18


4 MG


 


 Acetaminophen  650 mg  Q6HP  PRN


 PO  3/4/25 17:30


    3/19/25 10:18


650 MG


 


 Nitroglycerin  0.4 mg  Q5MINP  PRN


 SL  3/4/25 17:30


     





 


 Atorvastatin


 Calcium  10 mg  HS


 PO  3/4/25 22:00


    3/26/25 21:12


10 MG


 


 Docusate Sodium  100 mg  BIDP  PRN


 PO  3/4/25 21:00


     





 


 Budesonide  0.5 mg  BID


 NEB  3/4/25 22:00


    3/27/25 06:14


0.5 MG


 


 Levalbuterol HCl  0.625 mg  Q6HR


 NEB  3/6/25 06:00


    3/27/25 12:17


0.625 MG


 


 Phenylephrine HCl


 80 mg/Sodium


 Chloride  250 ml @ 


 7.5 mls/hr  Q24H


 IV  3/6/25 15:00


    3/16/25 16:01


15 MLS/HR


 


 Diagnostic Test


  (Pha)  1 strip  Q6HR


 VI  3/10/25 18:00


    3/27/25 12:11


1 STRIP


 


 Insulin Human


 Regular  FOLLOW


 SLIDING


 SCALE  Q6HR


 SC  3/10/25 18:00


    3/26/25 06:06


4 UNITS


 


 Dextrose  50 ml  UD


 IV  3/10/25 15:30


     





 


 Pantoprazole


 Sodium  40 mg  BID


 IV  3/11/25 22:00


    3/27/25 12:08


40 MG


 


 Mupirocin  1 applic  BID


 TOP  3/18/25 22:00


    3/27/25 10:00


1 APPLIC


 


 Amiodarone HCl  200 mg  DAILY


 PO  3/19/25 10:00


    3/27/25 12:50


200 MG


 


 Levothyroxine


 Sodium  100 mcg  QAM


 PO  3/21/25 08:16


    3/27/25 06:05


100 MCG


 


 Dimethicone  80 mg  BID


 PO  3/22/25 22:00


    3/26/25 21:11


80 MG


 


 Midodrine  15 mg  DAILY@0600,1200,1800


 PO  3/23/25 18:00


    3/27/25 12:51


15 MG


 


 Polyethylene


 Glycol  17 gm  BID


 PO  3/23/25 22:00


    3/26/25 21:11


17 GM


 


 Epoetin Kiran-epbx  10,000 unit  MWF


 SC  3/24/25 10:00


    3/24/25 12:36


10,000 UNIT


 


 Lactulose  15 ml  TID


 PO  3/24/25 14:00


    3/27/25 06:11


15 ML


 


 Norepinephrine


 Bitartrate 32 mg/


 Sodium Chloride  250 ml @ 


 0.234 mls/


 hr  Q24H


 IV  3/24/25 13:15


    3/27/25 00:06


3.75 MLS/HR


 


 Norepinephrine


 Bitartrate 32 mg/


 Sodium Chloride  250 ml @ 0


 mls/hr  Q0M


 IV  3/24/25 13:15


   UNV  





 


 Nystatin  5 ml  TID


 MT  3/25/25 14:00


    3/27/25 06:05


5 ML


 


 Albumin Human  100 ml @ 


 100 mls/hr  KRISTI  PRN


 IV  3/26/25 15:15


    3/27/25 11:10


100 MLS/HR


 


 Vasopressin 20


 units/Sodium


 Chloride  100 ml @ 9


 mls/hr  Q11H7M


 IV  3/27/25 16:45


   Cancel  





 


 Hydrocortisone


 Sodium Succinate  100 mg  Q6HR


 IV  3/27/25 18:00


    3/27/25 18:00


100 MG


 


 Vasopressin 40


 units/Dextrose  200 ml @ 


 60 mls/hr  Q3H20M


 IV  3/27/25 17:15


   Cancel  





 


 Phenylephrine HCl


 80 mg/Sodium


 Chloride  250 ml @ 


 7.5 mls/hr  Q24H


 IV  3/27/25 17:15


     





 


 Norepinephrine


 Bitartrate 32 mg/


 Sodium Chloride  250 ml @ 


 0.938 mls/


 hr  Q24H


 IV  3/27/25 17:15


     





 


 Epinephrine HCl


 16 mg/Dextrose  250 ml @ 


 1.875 mls/


 hr  Q24H


 IV  3/27/25 17:15


     





 


 Dopamine HCl/


 Dextrose  250 ml @ 


 7.734 mls/


 hr  Q24H


 IV  3/27/25 19:15


     





 


 Vasopressin 20


 units/Sodium


 Chloride  100 ml @ 9


 mls/hr  Q11H7M


 IV  3/27/25 19:15


     











objective


GENERAL:  Mild distress  


HEENT: EOMI,  Moist mucous membranes.  No scleral icterus.  No cervical 

lymphadenopathy.


LUNGS: Clear to auscultation bilaterally.  No accessory muscle use.


CARDIOVASCULAR: Regular rate and rhythm.  No murmur.  No JVD.


ABDOMEN: Mild-to-moderate periumbilical tenderness to palpation, +BS, ABD is 

distended


EXTREMITIES: No edema.  Nontender.


SKIN: No rashes or lesions.  Warm.


NEUROLOGIC:  Alert and oriented X3.


laboratory and microbiology


                                Laboratory Tests


3/27/25 19:00








3/27/25 16:36

















Test


 3/27/25


19:00 Range/Units


 


 


Serum Glucose 187 H   mg/dL





CXR





IMPRESSION:     





 Right perihilar patchy airspace disease,, new since the prior exam.  This may 

represent congestion or contusion.


Problems(with codes):  


(1) ESRD (end stage renal disease) on dialysis


(2) Abdominal pain


(3) History of peritoneal dialysis


(4) Chronic kidney disease


(5) UTI (urinary tract infection)


(6) UGI bleed


Prognosis


PLAN





Patient was seen earlier in the afternoon after she came back from dialysis 

catheter placement


This evening the patient apparently had a code blue and underwent CPR and 

intubation


 Patient had to be intubated due to rapid


decline. Per RN, patient noted to become


bradycardic and shortly after, patient went into asystole


with no detectable pulse. CPR initiated.


Patient had a recess two successful ROSC


Continue IV Protonix at 40 q.12 hours 


Start TPN 


Prognosis remains guarded


Monitor labs 


Transfuse to keep hemoglobin above seven





Dietary Evaluation Review


Comments:  


Encourage high protein diet. consider Nepro 240ml 19, 432 kcal. PO  


supplements BID


Expected Outcomes/Goals:  


maintain protein levels WNL


Plan discussed with:  Other (ICU Nurse)











ISABELLE CARLSON MD                Mar 27, 2025 21:53

## 2025-03-27 NOTE — RESUS
CODE BLUE ASSESSSMENT


History of Events


History of Events:  


Pt's heart rate dropped and pulses lost. Code blue called.





Initial Information


Date:  Mar 27, 2025


Time:  :


Location of Arrest:  ICU ([place])


Arrest Witnessed:  Yes


CPR started initial time:  19:


CPR started by whom:  Hospital Staff


Pre-Hospital Care:  Pre-Code Care (inpatient)


Type of arrest:  Cardiac, Respiratory, Adult, Witnessed


Spontaneous Respirations:  No


Pulse Present:  No


Monitoring:  ECG, Pulse Oximetry, Telemetry


Crash Cart Opened and Supplies:  Yes





Airway Ventilation


Breathing at Onset:  Assisted


Oxygen Delivery Method:  Mechanical Ventilator


Time of first Assisted Ventila:  19:28


Artificial Ventilation:  Bag/Endo tube


Comments:  


Pt had previously coded; was intubated prior to code





Circulation


Circulation #1:  


   Time:  19:28


   Pulse Rate (adult):  0


   Blood Pressure Systolic:  0


   Blood Pressure Diastolic:  0


   Circulation Comment:  


Asystole


Circulation #2:  


   Time:  19:30


   Pulse Rate (adult):  0


   Blood Pressure Systolic:  0


   Blood Pressure Diastolic:  0


   Circulation Comment:  


Asystole


Circulation #3:  


   Time:  19:32


   Pulse Rate (adult):  0


   Blood Pressure Systolic:  0


   Blood Pressure Diastolic:  0


   Circulation Comment:  


Asystole


Circulation #4:  


   Time:  19:34


   Pulse Rate (adult):  0


   Blood Pressure Systolic:  0


   Blood Pressure Diastolic:  0


   Circulation Comment:  


Asystole


Circulation #5:  


   Time:  19:36


   Pulse Rate (adult):  0


   Blood Pressure Systolic:  0


   Blood Pressure Diastolic:  0


   Circulation Comment:  


Asystole


Circulation #6:  


   Time:  19:37


   Pulse Rate (adult):  0


   Blood Pressure Systolic:  0


   Blood Pressure Diastolic:  0


   Circulation Comment:  


TOD





Medications & Response


Medications and Responses #1:  


   Medication Time:  19:29


   ADULT Medications Given ADULT:  Epinephrine 1 mg


   Route of Administration:  IV


   Heart Rate:  0


   EKG Rhythm:  Asystole


   Blood Pressure Systolic:  0


   Blood Pressure Diastolic:  0


   Respiratory Rate:  0


Medications and Responses #2:  


   Medication Time:  19:30


   ADULT Medications Given ADULT:  Sodium Bacarbinate 50 meq


   Route of Administration:  IV


   Heart Rate:  0


   EKG Rhythm:  Asystole


   Blood Pressure Systolic:  0


   Blood Pressure Diastolic:  0


   Respiratory Rate:  0


Medications and Responses #3:  


   Medication Time:  19:32


   ADULT Medications Given ADULT:  Epinephrine 1 mg


   Route of Administration:  IV


   Heart Rate:  0


   EKG Rhythm:  Asystole


   Blood Pressure Systolic:  0


   Blood Pressure Diastolic:  0


   Respiratory Rate:  0


Medications and Responses #4:  


   Medication Time:  19:34


   ADULT Medications Given ADULT:  Sodium Bacarbinate 50 meq


   Route of Administration:  IV


   Heart Rate:  0


   EKG Rhythm:  Asystole


   Blood Pressure Systolic:  0


   Blood Pressure Diastolic:  0


   Respiratory Rate:  0


Medications and Responses #5:  


   Medication Time:  19:35


   ADULT Medications Given ADULT:  Epinephrine 1 mg


   Route of Administration:  IV


   Heart Rate:  0


   EKG Rhythm:  Asystole


   Blood Pressure Systolic:  0


   Blood Pressure Diastolic:  0


   Respiratory Rate:  0





Nurses Notes


Barbara Coma Scale Eye Opening:  None (1)


Barbara Coma Scale Verbal:  None (1)


Barbara Coma Scale Motor:  None (1)


Glascow Total:


3


Pupil Reaction:  Non Reactive


Bedside Blood Glucose:  95





Time Code Ended


Time Code Ended:  19:37


Post Arrest Status:  


Outcome of code:  Unsuccessful


Patient pronounced by:  Dr Burnett


Time patient pronounced:  19:37


Family notified:  Yes


Attending called:  Yes


Code Team Present:  


Dr Burnett - ADELSO HAINES; Eufemia MARCANO RN - ICU Charge RN; Destini JULES RN - House


Supervisor; RT Dee; Nimo FRANKLIN, RN; Germania COX RN; Yajaira NICOLE, RN; Juliane FRANKLIN, RN; MARIANNE Zavala Ashley                Mar 27, 2025 21:59

## 2025-03-27 NOTE — DVHNC2
Procedure


-


Right Femoral Arterial Line Procedure Note


INDICATION: SHOCK


PROCEDURE :  DR OSCAR SUAREZ





CONSENT: Implied as emergent. Medically necessary for hemodynamic monitoring.


 


PROCEDURE SUMMARY: 


A time out was performed. My hands were washed immediately prior to the 

procedure. I wore a surgical cap, mask with protective eyewear, sterile gown and

sterile gloves throughout the procedure. The  LEFT inguinal region was prepped 

using chlorhexidine scrub and draped in sterile fashion using a three quarter 

sheet drape and sterile towels. The femoral pulse was identified. Anesthesia was

achieved using 1% lidocaine. Palpating the femoral pulse throughout the 

procedure and visualization on ultrasound using the linear ultrasound probe in 

the transverse orientation. The femoral artery was identified and avoided 

utilizing color-flow. The femoral artery was then placed in the center of the 

ultrasound field and pulsation was evident. A movement artifact was identified 

as the needle was advanced through the skin and advanced toward the vessel. A 

real time hyperechoic signal revealed visualization of vascular needle entry 

into the lumen of the femoral artery as blood was noted to flashback in the syr

[place]. Arterial blood was withdrawn. The syringe was removed and a guidewire was 

advanced through the needle into the femoral artery. The needle was exchanged 

over the wire for an arterial catheter. The wire was removed and the catheter 

was secured to the skin using 2 sutures. The patient tolerated the procedure 

without any hemodynamic compromise. At time of procedure completion, the 

catheter was connected to the cardiac monitor and calibrated. Appropriate 

waveform and blood pressure tracing was observed. 





Estimated blood loss is 5mL.





CPT 69728 arterial line placement


CPT 12687 ultrasound Add On











JOSEPH SUAREZ MD             Mar 27, 2025 18:24

## 2025-03-27 NOTE — RESUS
CODE BLUE ASSESSSMENT


History of Events


History of Events:  


Patient ICU admit. Primary RN reports patient underwent tunnel cath


procedure today in cath lab. Patient had to be intubated due to rapid


decline. Per RN, patient noted to become


bradycardic and shortly after, patient went into asystole


with no detectable pulse. CPR initiated.





Initial Information


Date:  Mar 27, 2025


Time:  18:18


Location of Arrest:  ICU ([facility])


Arrest Witnessed:  Yes


CPR started initial time:  18:18


CPR started by whom:  Hospital Staff


Pre-Hospital Care:  ACLS


Type of arrest:  Cardiac, Respiratory


Spontaneous Respirations:  No


Monitoring:  Pulse Oximetry, Telemetry


Crash Cart Opened and Supplies:  Yes





Airway Ventilation


Breathing at Onset:  Assisted


Oxygen Delivery Method:  Mechanical Ventilator


Artificial Ventilation:  Bag/Endo tube


Intubation Size:  8.0 cuffed


Comments:  


patient was intubated prior to code





Circulation


Circulation #1:  


   Time:  18:20


   Circulation Comment:  


asystole


Circulation #2:  


   Time:  18:22


   Circulation Comment:  


asystole


Circulation #3:  


   Time:  18:24


   Circulation Comment:  


asystole


Circulation #4:  


   Time:  18:26


   Circulation Comment:  


asystole


Circulation #5:  


   Time:  18:28


   Circulation Comment:  


asystole


Circulation #6:  


   Time:  18:30


   Circulation Comment:  


asystole


Circulation #7:  


   Time:  18:32


   Circulation Comment:  


asystole


Circulation #8:  


   Time:  18:35


   Circulation Comment:  


asystole


Circulation #9:  


   Time:  18:37


   Circulation Comment:  


asystole


Circulation #10:  


   Time:  18:39


   Circulation Comment:  


PEA


Circulation #11:  


   Time:  18:41


   Circulation Comment:  


PEA


Circulation #12:  


   Time:  18:43


   Pulse Rate (adult):  46


   Blood Pressure Systolic:  120


   Blood Pressure Diastolic:  35


   Circulation Comment:  


ROSC





Medications & Response


Medications and Responses #1:  


   Medication Time:  18:18


   ADULT Medications Given ADULT:  Epinephrine 1 mg, Sodium Bacarbinate 50 meq


   Route of Administration:  IV


   EKG Rhythm:  Asystole


   EKG Rhythm:  Asystole


Medications and Responses #2:  


   Medication Time:  18:21


   ADULT Medications Given ADULT:  Epinephrine 1 mg, Sodium Bacarbinate 50 meq, 

Calcium Chloride 10 mL


   Route of Administration:  IV


   EKG Rhythm:  Asystole


Medications and Responses #3:  


   Medication Time:  18:24


   ADULT Medications Given ADULT:  Epinephrine 1 mg


   Route of Administration:  IV


   EKG Rhythm:  Asystole


   EKG Rhythm:  Asystole


Medications and Responses #4:  


   Medication Time:  18:27


   ADULT Medications Given ADULT:  Epinephrine 1 mg


   Route of Administration:  IV


   EKG Rhythm:  Asystole


   EKG Rhythm:  Asystole


Medications and Responses #5:  


   Medication Time:  18:30


   ADULT Medications Given ADULT:  Calcium Chloride 10 mL


   Route of Administration:  IV


   EKG Rhythm:  Asystole


   EKG Rhythm:  Asystole


Medications and Responses #6:  


   Medication Time:  18:31


   ADULT Medications Given ADULT:  Epinephrine 1 mg


   Route of Administration:  IV


   EKG Rhythm:  Asystole


   EKG Rhythm:  Asystole


Medications and Responses #7:  


   Medication Time:  18:33


   ADULT Medications Given ADULT:  Sodium Bacarbinate 50 meq


   Route of Administration:  IV


   EKG Rhythm:  Asystole


   EKG Rhythm:  Asystole


Medications and Responses #8:  


   Medication Time:  18:35


   ADULT Medications Given ADULT:  Epinephrine 1 mg


   Route of Administration:  IV


   EKG Rhythm:  Asystole


Medications and Responses #9:  


   Medication Time:  18:39


   ADULT Medications Given ADULT:  Epinephrine 1 mg, Sodium Bacarbinate 50 meq


   Route of Administration:  IV


   EKG Rhythm:  Asystole


Medications and Responses #10:  


   Medication Time:  18:41


   ADULT Medications Given ADULT:  Atropine 1 mg


   Route of Administration:  IV


   EKG Rhythm:  PEA


Medications and Responses #11:  


   Medication Time:  18:42


   ADULT Medications Given ADULT:  Epinephrine 1 mg


   EKG Rhythm:  PEA





Pacing


Pacer Pads Applied and Pacing:  Yes


Reason for Pacing:  BRADYCARDIA


Pulse Present with Pacing:  Yes


Comments:


STARTED AT 1844





Procedure - Central Venous Cat


Comment:


PRESENT PRIOR TO CODE





Procedure - Sams Catheter


Comment:


PRESENT PRIOR TO CODE





Nurses Notes


[place] Coma Scale Eye Opening:  None (1)


[place] Coma Scale Verbal:  None (1)


[place] Coma Scale Motor:  None (1)


EKG Rhythm:  Sinus Bradycardia


Nurses Notes - Comment:  


EKG POST ROSC SINUS EVELIA/ PACED





Time Code Ended


Time Code Ended:  18:43


Post Arrest Status:  Ventilated


Outcome of code:  Successful


Family notified:  Yes


Attending called:  Yes


Code Team Present:  


DR TONY BOYD


Time of ROSC:  18:43





Date of Service:  Mar 27, 2025


Billing Provider:  KAMARI RIZVI MD


Common Visit Codes:  PROCEDURE ONLY


Procedure Codes:  [id number] CODE Heidi Izquierdo                   Mar 27, 2025 20:00


KAMARI RIZVI MD            Apr 2, 2025 23:31

## 2025-03-28 NOTE — ECG
[facility]

                                       

Test Date:    2025               Test Time:    07:25:13

Pat Name:     MARTIN ADAMS         Department:   icu

Patient ID:   [id number]           Room:         [address] [place] [address] A

Gender:       F                        Technician:   jessica

:          1942               Requested By: BEATRIS CARY

Order Number: [id number]        Reading MD:   Geoff Suarez

                                 Measurements

Intervals                              Axis          

Rate:         139                      P:            0

NH:           0                        QRS:          -4

QRSD:         81                       T:            21

QT:           302                                    

QTc:          459                                    

                           Interpretive Statements

Atrial fibrillation with rapid V-rate

Borderline low voltage, extremity leads

ST depression, probably rate related

Electronically Signed On 3- 11:54:41 PDT by Geoff Suarez



Please click the below link to view image of tracing.

## 2025-03-28 NOTE — ECG
[facility]

                                       

Test Date:    2025               Test Time:    16:34:59

Pat Name:     MARTIN ADAMS         Department:   icu

Patient ID:   [id number]           Room:         [address] [place] [address] A

Gender:       F                        Technician:   wayne

:          [id number]               Requested By: BEATRIS CARY

Order Number: [id number]        Reading MD:   Geoff Suarez

                                 Measurements

Intervals                              Axis          

Rate:         103                      P:            28

TN:           193                      QRS:          71

QRSD:         79                       T:            86

QT:           344                                    

QTc:          451                                    

                           Interpretive Statements

Sinus tachycardia

Paired ventricular premature complexes

Low voltage, precordial leads

Nonspecific T abnormalities, lateral leads

Electronically Signed On 3- 11:55:09 PDT by Geoff Suarez



Please click the below link to view image of tracing.

## 2025-03-28 NOTE — ECG
[facility]

                                       

Test Date:    2025               Test Time:    12:56:27

Pat Name:     MARTIN ADAMS         Department:   icu

Patient ID:   [id number]           Room:         [address] [place] [address] A

Gender:       F                        Technician:   jessica

:          1942               Requested By: BEATRIS CARY

Order Number: [id number]        Reading MD:   Geoff Suarez

                                 Measurements

Intervals                              Axis          

Rate:         87                       P:            29

MO:           165                      QRS:          48

QRSD:         70                       T:            117

QT:           399                                    

QTc:          480                                    

                           Interpretive Statements

Sinus rhythm

Low voltage, precordial leads

Borderline repol abnrm, anterolateral leads

Electronically Signed On 3- 11:55:03 PDT by Geoff Suarez



Please click the below link to view image of tracing.

## 2025-03-28 NOTE — DVHDS2
Death Summary


Date of Admission


Mar 4, 2025 at 17:14





Date and Time of Expiration:


Mar 27, 2025


19:54





Reason for Admission:


Information in this HPI is limited due to the patient being a poor historian.  

Acquired with the assistance of the patient's daughter at the bedside.  

82-year-old female with past medical history of ESRD on PD, AFib diagnosed 

2024, chronic abdominal pain, and diarrhea after taking lactulose 

presents after being sent in from urgent care for an abnormal EKG.  Patient 

initially went to urgent care with complaints of chest pain, recent diagnosis of

pneumonia and shortness of breath.  During the emergency department evaluation 

potassium levels found to be 5.7/5.9.  Patient also found to be AFib 130s.  

Patient was recently discharged from [facility] on 2024 on Eliquis 2.5 and amiodarone bid.  At this time patient endorses chest 

pain, generalized weakness, abdominal pain.  Denies fevers, chills, dizziness, 

leg swelling,





Labs/Diagnostic Data:





                               Laboratory Results








Test


 3/27/25


19:35 3/27/25


19:00 3/27/25


17:45 3/27/25


16:36


 


POC Glucose


 95 mg/dl


() 


 


 





 


Sodium Level


 


 141 mmol/L


(136-145) 


 





 


Potassium Level


 


 5.3 mmol/L


(3.5-5.1) 


 





 


Chloride Level


 


 105 mmol/L


() 


 





 


Carbon Dioxide Level


 


 19 mmol/L


(20-31) 


 





 


Anion Gap  17 (5-15)   


 


Blood Urea Nitrogen


 


 22 mg/dL


(9-23) 


 





 


Creatinine


 


 2.07 mg/dL


(0.550-1.02) 


 





 


Glomerular Filtration Rate


Calc 


 23 mL/min


(>90) 


 





 


BUN/Creatinine Ratio


 


 10.6


(10.0-20.0) 


 





 


Serum Glucose


 


 187 mg/dL


() 


 





 


Calcium Level


 


 9.0 mg/dL


(8.7-10.4) 


 





 


Magnesium Level


 


 2.0 mg/dL


(1.6-2.6) 


 





 


Total Bilirubin


 


 0.6 mg/dL


(0.2-1.0) 


 





 


Aspartate Amino Transferase


(AST) 


 2195 U/L


(13-40) 


 





 


Alanine Aminotransferase (ALT)  902 U/L (7-40)   


 


Alkaline Phosphatase


 


 161 U/L


() 


 





 


Ammonia


 


 62 umol/L


(11-32) 


 





 


Troponin I High Sensitivity


 


 981 ng/L


(</=34) 


 





 


Total Protein


 


 2.6 g/dL


(5.7-8.2) 


 





 


Albumin


 


 2.0 g/dL


(3.2-4.8) 


 





 


Blood Gas Specimen Type   Arterial  


 


Blood Gas Sample Site   Arterial line  


 


Blood Gas Patient Temperature   37.0  


 


Arterial Blood Date Drawn   [id number]  


 


Arterial Blood pH


 


 


 7.273


(7.350-7.450) 





 


Arterial Blood Partial


Pressure CO2 


 


 29.9 mmHg


(32.0-45.0) 





 


Arterial Blood Partial


Pressure O2 


 


 95.3 mmHg


(83.0-108.0) 





 


Arterial Blood HCO3


 


 


 13.5 mmol/L


(21.0-28.0) 





 


Arterial Blood Oxygen


Saturation 


 


 95.4 %


(94.0-98.0) 





 


Arterial Blood Base Excess


 


 


 -12.2 mmol/L


(-2.0-3.0) 





 


Arterial Blood Oxyhemoglobin


 


 


 94.4 %


(94.0-98.0) 





 


Arterial Blood


Carboxyhemoglobin 


 


 0.3 %


(0.5-1.5) 





 


Arterial Blood Methemoglobin


 


 


 0.7 %


(0.0-1.5) 





 


Abdon Test   N/a  


 


Blood Gas Total Hemoglobin


 


 


 7.90 g/dL


(12.0-16.0) 





 


Blood Gas Set Respiration Rate   20.0  


 


Blood Gas Modality   Vent - ac  


 


FiO2 %   100.0  


 


Blood Gas Tidal Volume   400.0  


 


Blood Gas PEEP or CPAP   5.0  


 


White Blood Count


 


 


 


 11.3 10^3/uL


(4.4-10.8)


 


Red Blood Count


 


 


 


 2.46 10^6/uL


(4.0-5.20)


 


Hemoglobin


 


 


 


 7.3 g/dL


(12.2-16.2)


 


Hematocrit


 


 


 


 23.1 %


(36.0-46.0)


 


Mean Corpuscular Volume


 


 


 


 93.8 fL


(80.0-100.0)


 


Mean Corpuscular Hemoglobin


 


 


 


 29.8 pg


(28.0-32.0)


 


Mean Corpuscular Hemoglobin


Concent 


 


 


 31.8 g/dL


(32.0-36.0)


 


Red Cell Distribution Width


 


 


 


 24.0 %


(11.8-14.3)


 


Platelet Count


 


 


 


 106 10^3/uL


(140-450)


 


Mean Platelet Volume


 


 


 


 7.7 fL


(6.9-10.8)


 


Neutrophils (%) (Auto)     % (37.0-80.0) 


 


Lymphocytes (%) (Auto)     % (10.0-50.0) 


 


Monocytes (%) (Auto)     % (0.0-12.0) 


 


Basophils (%) (Auto)     % (0.0-2.0) 


 


Neutrophils # (Auto)


 


 


 


 10 ^3/uL


(1.6-8.6)


 


Lymphocytes # (Auto)


 


 


 


 10 ^3/uL


(0.4-5.4)


 


Monocytes # (Auto)


 


 


 


 10 ^3/uL


(0-1.3)


 


Differential Total Cells


Counted 


 


 


 100.0 (100) 





 


Neutrophils % (Manual)    71 (37.0-80.0) 


 


Band Neutrophils % (Manual)    2 


 


Lymphocytes % (Manual)    22 (10.0-50.0) 


 


Monocytes % (Manual)    4 (0-12) 


 


Eosinophils % (Manual)    1 (0-7) 


 


Basophils % (Manual)    0 (0.0-2.0) 


 


Metamyelocytes % (manual)    0 


 


Myelocytes % (Manual)    0 


 


Promyelocytes % (Manual)    0 


 


Blast Cells % (Manual)    0 


 


Nucleated Red Blood Cells     % 


 


Reactive Lymphocytes    0 


 


Platelet Estimate    Decreased 


 


Hypochromasia (manual)    Slight 


 


Poikilocytosis (manual)    Moderate 


 


Anisocytosis (manual)    Moderate 


 


Phosphorus Level


 


 


 


 5.0 mg/dL


(2.4-5.1)


 


B-Type Natriuretic Peptide


 


 


 


 1362.81 pg/mL


(0-100)


 


Test


 3/27/25


07:45 3/27/25


04:45 3/26/25


03:00 3/25/25


03:00


 


Prothrombin Time


 14.6 sec


(9.3-11.8) 


 


 





 


Prothrombin Time INR


 1.43


(0.9-1.15) 


 


 





 


Activated Partial


Thromboplast Time 27.4 SEC


(24.5-34.5) 


 


 





 


Stomatocytes  Few   


 


Large Platelets   Few  


 


Polychromasia    Slight 


 


Test


 3/24/25


03:15 3/23/25


16:00 3/22/25


03:30 3/21/25


16:59


 


Iron Level


 58 ug/dL


() 


 


 





 


Total Iron Binding Capacity


 205 ug/dL


(250-425) 


 


 





 


Percent Iron Saturation 28.3 % (15-50)    


 


Ferritin


 533.7 ng/mL


() 


 


 





 


Body Fluid Source


 


 Peritoneal


fluid 


 





 


Body Fluid WBC (Manual)


 


 107 CUMM


(0-200) 


 





 


Body Fluid RBC (Manual)


 


 0 CUMM


(0-2000) 


 





 


Body Fluid Mononuclear Cells  98 %   


 


Body Fluid Polymorphonuclear


Cells 


 2 % (0-25) 


 


 





 


Eosinophils (%) (Auto)


 


 


 0.0 %


(0.0-7.0) 





 


Eosinophils # (Auto)


 


 


 0 10 ^3/uL


(0-0.8) 





 


Basophils # (Auto)


 


 


 0 10 ^3/uL


(0-0.2) 





 


Stool Occult Blood


 


 


 


 Positive


(Negative)


 


Stool Occult Blood Sample #3     (Negative) 


 


Test


 3/21/25


04:00 3/20/25


03:15 3/14/25


02:30 3/13/25


03:00


 


Random Vancomycin Level


 12.0 ug/mL


(5-10) 


 


 





 


Hepatitis A IgM Antibody  Negative   


 


Hepatitis B Surface Antigen


 


 Negative


(Negative) 


 





 


Hepatitis B Core IgM Antibody


 


 Negative


(Negative) 


 





 


Hepatitis C Antibody


 


 Negative


(Negative) 


 





 


Body Fluid pH   7.0  


 


Lactic Acid Level


 


 


 


 1.2 mmol/L


(0.4-2.0)


 


Test


 3/11/25


03:00 3/10/25


08:42 3/10/25


03:00 3/9/25


16:55


 


Miscellaneous Referred Test


(Rm Tmp Sent to


[facility] 


 


 





 


Triglycerides Level


 83 mg/dL (<


150) 


 


 





 


Vitamin D 25-Hydroxy


 


 94.3 ng/mL


(30.0-100) 


 





 


Cortisol AM Sample


 


 41.45 ug/dL


(5.27-22.45) 


 





 


Parathyroid Hormone (Intact)


 


 


 534.7 pg/mL


(18.4-80.1) 





 


Thyroid Stimulating Hormone


(TSH) 


 


 


 2.61 uIU/mL


(0.55-4.78)


 


Free Thyroxine (T4) Calculated


 


 


 


 1.32 ng/dL


(0.89-1.76)


 


Cortisol PM Sample


 


 


 


 58.71 ug/dL


(3.44-16.76)


 


Test


 3/4/25


16:30 


 


 





 


Urine Color


 Colorless


(Yellow) 


 


 





 


Urine Clarity Clear (Clear)    


 


Urine pH 7.5 (5.0-9.0)    


 


Urine Specific Gravity


 1.007


(1.001-1.035) 


 


 





 


Urine Protein 1+ (Negative)    


 


Urine Ketones


 Negative


(Negative) 


 


 





 


Urine Blood


 Negative /uL


(Negative) 


 


 





 


Urine Nitrite


 Negative


(Negative) 


 


 





 


Urine Bilirubin


 Negative


(Negative) 


 


 





 


Urine Urobilinogen


 Normal mg/dL


(Negative) 


 


 





 


Urine Leukocyte Esterase


 Negative /uL


(Negative) 


 


 





 


Urine RBC 6 /hpf (0 - 4)    


 


Urine Microscopic WBC 3 /HPF (0-5)    


 


Urine Squamous Epithelial


Cells None seen /hpf


(<5) 


 


 





 


Urine Bacteria


 None seen /hpf


(None Seen) 


 


 





 


Urine Mucus


 Few (None


Seen) 


 


 





 


Urine Glucose


 4+ mg/dL


(Normal) 


 


 








                             Other Laboratory Tests


3/27/25 19:00








3/27/25 16:36











Brief Hx & Hospital Course:


She is admitted and underwent multiple evaluations including multiple 

consultants with Cardiology Nephrology GI and Infectious Disease given her 

multiple comorbid medical condition.  She was started on Levophed for 

hypotension.  Hyperkalemia is corrected.  Patient underwent peritoneal dialysis 

in the hospital.  Patient's went into AFib with RVR and was on IV amiodarone and

subsequently transitioned to oral amiodarone.  Her echocardiogram showed a 

normal ejection fraction.  Cardiology recommended conservative medical 

management/treatment.  Patient's fluid cultures did not show any suggestion of 

infection from negative cultures.  Her blood cultures were also negative.  

Patient ruled out for any severe septic shock.  However patient continued to be 

hypotensive requiring pressor support on and off during her hospitalization.  

Started on midodrine for low blood pressure.  Patient did not tolerate her 

peritoneal dialysis while in the hospital.  Therefore Nephrology recommended 

transitioned to hemodialysis.  Patient underwent temporary groin dialysis 

catheter placement.  Patient underwent dialysis through this however she also 

had difficulty with successful hemodialysis through groin catheter therefore 

recommended long-term tunneled dialysis catheter.  Per nurse patient apparently 

had an episode of chest discomfort and generalized body aches post dialysis on 

3/27/25 and resolved without any intervention.  Patient underwent tunneled 

dialysis catheter placement on 2025 afternoon.  Subsequent to that patient

came back to the ICU and remained stable with a normal mentation and without any

complaints with a stable blood pressure for few hours.  I was called over the 

phone by her nurse Jeannine around 4:30pm regarding her sudden change in her 

condition with becoming hypotensive and lethargic.  Given her procedure advised 

nurse to give Narcan and advised her to start her on multiple pressors, repeat 

labs, chest x-ray and to notify consultants involved in her care as well as 

daughter regarding her change in condition.  Patient is subsequently received 

fluids and started on multiple pressors and evaluated by Dr. Charlton pulmonologist

and intubated/central line placed.  She remained critically ill on the 

ventilator with the multiple pressor support and subsequently patient became 

bradycardic and code blue was called.  Please see the detailed code blue notes. 

Subsequent to that patient did not survive and passed away in the ICU.  I was 

called by nurse after shift change around 8:30 p.m. regarding patient passing 

away and to talked to the daughter.  Her daughter is at bedside and apparently 

tearful and did not want talked to a physician.  However, I have told the nurse 

that I am happy to talk to her and advised the nurse to talk with daughter 

regarding option of considering autopsy given her sudden clinical deterioration 

with hypotension and subsequent death.





Operations or Procedures


Tunneled dialysis catheter placement in the right chest on 2025





Final Diagnosis/Problems List





Hypotension, end-stage renal disease on dialysis, atrial fibrillation with


a rapid ventricular response, chronic AFib, hypertension, chronic


abdominal pain, electrolyte abnormalities.





[facility]      Mar 28, 2025 12:12